# Patient Record
Sex: MALE | Race: WHITE | Employment: OTHER | URBAN - NONMETROPOLITAN AREA
[De-identification: names, ages, dates, MRNs, and addresses within clinical notes are randomized per-mention and may not be internally consistent; named-entity substitution may affect disease eponyms.]

---

## 2017-01-10 DIAGNOSIS — I10 ESSENTIAL HYPERTENSION: Primary | ICD-10-CM

## 2017-01-11 RX ORDER — LISINOPRIL 30 MG/1
TABLET ORAL
Qty: 90 TABLET | Refills: 2 | Status: SHIPPED | OUTPATIENT
Start: 2017-01-11 | End: 2017-10-10

## 2017-01-19 ENCOUNTER — TELEPHONE (OUTPATIENT)
Dept: ONCOLOGY | Facility: OTHER | Age: 67
End: 2017-01-19

## 2017-01-19 ENCOUNTER — ONCOLOGY VISIT (OUTPATIENT)
Dept: ONCOLOGY | Facility: OTHER | Age: 67
End: 2017-01-19
Attending: PSYCHIATRY & NEUROLOGY
Payer: MEDICARE

## 2017-01-19 VITALS
OXYGEN SATURATION: 98 % | HEIGHT: 71 IN | SYSTOLIC BLOOD PRESSURE: 129 MMHG | RESPIRATION RATE: 16 BRPM | BODY MASS INDEX: 23.66 KG/M2 | DIASTOLIC BLOOD PRESSURE: 76 MMHG | HEART RATE: 69 BPM | WEIGHT: 169 LBS | TEMPERATURE: 98 F

## 2017-01-19 DIAGNOSIS — C91.10 CLL (CHRONIC LYMPHOCYTIC LEUKEMIA) (H): Primary | ICD-10-CM

## 2017-01-19 DIAGNOSIS — C91.10 CLL (CHRONIC LYMPHOCYTIC LEUKEMIA) (H): ICD-10-CM

## 2017-01-19 LAB
ALBUMIN SERPL-MCNC: 4.4 G/DL (ref 3.4–5)
ALP SERPL-CCNC: 61 U/L (ref 40–150)
ALT SERPL W P-5'-P-CCNC: 31 U/L (ref 0–70)
ANION GAP SERPL CALCULATED.3IONS-SCNC: 7 MMOL/L (ref 3–14)
AST SERPL W P-5'-P-CCNC: 24 U/L (ref 0–45)
BASOPHILS # BLD AUTO: 0 10E9/L (ref 0–0.2)
BASOPHILS NFR BLD AUTO: 0 %
BILIRUB SERPL-MCNC: 0.9 MG/DL (ref 0.2–1.3)
BUN SERPL-MCNC: 20 MG/DL (ref 7–30)
CALCIUM SERPL-MCNC: 8.5 MG/DL (ref 8.5–10.1)
CHLORIDE SERPL-SCNC: 104 MMOL/L (ref 94–109)
CO2 SERPL-SCNC: 30 MMOL/L (ref 20–32)
CREAT SERPL-MCNC: 1.04 MG/DL (ref 0.66–1.25)
DIFFERENTIAL METHOD BLD: ABNORMAL
EOSINOPHIL # BLD AUTO: 0 10E9/L (ref 0–0.7)
EOSINOPHIL NFR BLD AUTO: 0 %
ERYTHROCYTE [DISTWIDTH] IN BLOOD BY AUTOMATED COUNT: 13.6 % (ref 10–15)
GFR SERPL CREATININE-BSD FRML MDRD: 71 ML/MIN/1.7M2
GLUCOSE SERPL-MCNC: 93 MG/DL (ref 70–99)
HCT VFR BLD AUTO: 41.5 % (ref 40–53)
HGB BLD-MCNC: 14 G/DL (ref 13.3–17.7)
LDH SERPL L TO P-CCNC: 280 U/L (ref 85–227)
LYMPHOCYTES # BLD AUTO: 39.6 10E9/L (ref 0.8–5.3)
LYMPHOCYTES NFR BLD AUTO: 73 %
MCH RBC QN AUTO: 30.7 PG (ref 26.5–33)
MCHC RBC AUTO-ENTMCNC: 33.7 G/DL (ref 31.5–36.5)
MCV RBC AUTO: 91 FL (ref 78–100)
MONOCYTES # BLD AUTO: 1.1 10E9/L (ref 0–1.3)
MONOCYTES NFR BLD AUTO: 2 %
NEUTROPHILS # BLD AUTO: 13.6 10E9/L (ref 1.6–8.3)
NEUTROPHILS NFR BLD AUTO: 25 %
PLATELET # BLD AUTO: 138 10E9/L (ref 150–450)
POTASSIUM SERPL-SCNC: 4.7 MMOL/L (ref 3.4–5.3)
PROT SERPL-MCNC: 7.3 G/DL (ref 6.8–8.8)
RBC # BLD AUTO: 4.56 10E12/L (ref 4.4–5.9)
SMUDGE CELLS BLD QL SMEAR: ABNORMAL
SODIUM SERPL-SCNC: 141 MMOL/L (ref 133–144)
VARIANT LYMPHS BLD QL SMEAR: ABNORMAL
WBC # BLD AUTO: 54.2 10E9/L (ref 4–11)

## 2017-01-19 PROCEDURE — 99213 OFFICE O/P EST LOW 20 MIN: CPT | Performed by: NURSE PRACTITIONER

## 2017-01-19 PROCEDURE — 99212 OFFICE O/P EST SF 10 MIN: CPT

## 2017-01-19 PROCEDURE — 83615 LACTATE (LD) (LDH) ENZYME: CPT | Performed by: NURSE PRACTITIONER

## 2017-01-19 PROCEDURE — 36415 COLL VENOUS BLD VENIPUNCTURE: CPT | Performed by: NURSE PRACTITIONER

## 2017-01-19 PROCEDURE — 85025 COMPLETE CBC W/AUTO DIFF WBC: CPT | Performed by: NURSE PRACTITIONER

## 2017-01-19 PROCEDURE — 80053 COMPREHEN METABOLIC PANEL: CPT | Performed by: NURSE PRACTITIONER

## 2017-01-19 ASSESSMENT — PAIN SCALES - GENERAL: PAINLEVEL: NO PAIN (0)

## 2017-01-19 NOTE — TELEPHONE ENCOUNTER
DATE:  1/19/2017   TIME OF RECEIPT FROM LAB:  8:46  LAB TEST:  White blood count  LAB VALUE:  54.2  RESULTS GIVEN WITH READ-BACK TO (PROVIDER):  greta  TIME LAB VALUE REPORTED TO PROVIDER:   8:49    Pretty Davenport

## 2017-01-19 NOTE — PROGRESS NOTES
Oncology Follow-up Visit:  January 19, 2017    Reason for Visit:  Patient presents with:  RECHECK: Follow up CLL  *_* Living Will *_*: Has paperwork     Nursing Note and documentation reviewed: yes    HPI:  This is a 66-year-old male patient who presents to the oncology clinic today in routine follow-up of CLL.  Extensive workup was completed in August of 2013 with initial consult. He has been followed with surveillance.  He continues to feel well.  He has no problems with fatigue or weight loss; he denies any fevers or night sweats or lumps or bumps noted.    Oncologic History:   Damien was diagnosed with CLL in August 2013 after his routine physical with Dr. Kenney showed an elevation of his white blood cell count on his CBC. CBC at that time revealed WBC of 18.9 with 69.6% lymphocytes, H&H was 14.8 and 42.3, platelet count was 158,000. Peripheral blood smear showed lymphocytosis consistent with chronic lymphocytic leukemia/small lymphocytic lymphoma and mild thrombocytopenia. Peripheral blood flow for cytometry showed an abnormal B cell population consistent with B-cell CLL. The patient was CD5-positive, CD10 negative as well as CD23 positive with 47% B cells expressed at C38 above levels of controls, 80% B cells had levels of cytoplasmic 70 comparable to T lymphocytes and assessment was sent for cytogenetics and FISH. Serum protein electrophoresis was negative. The FISH study was not able to be completed at that time.   He was then referred to Dr. Shantanu Stubbs at the [facility] who saw the patient on 10/16/2013. In review of patient's studies, he felt the patient had stage II CLL as he did have mild splenomegaly on his PET scan. He had concerns about flow cytometry findings as it pointed to a more aggressive disease and the FISH testing was repeated and showed predominance of trisomy 12 which was consistent with intermediate prognosis. It was felt the major determinant of prognosis in CLL would be the  behavior of the disease over the course of the next several months to a year and in subsequent followups patient has been essentially asymptomatic. Dr. Tidwell recommended CBCs every 3 months along with physical exams for nodes and organomegaly and recommended a CT scan yearly.     Current Chemo Regime/TX: n/a  Current Cycle: n/a  # of completed cycles: n/a    Previous treatment: n/a    Past Medical History   Diagnosis Date     Elevated blood pressure reading without diagnosis of hypertension 10/16/2002     Fracture of metatarsal bone(s), closed 10/29/2001     right 5th metatarsal bone     Other orthopedic aftercare(V54.89) 11/08/2001     Routine general medical examination at [facility] 12/01/2000     Special screening for malignant neoplasms, colon 02/04/2004     Hypertension      Chronic lymphocytic leukemia (CLL), T-cell (H)      white counts in the 34,000 to 40,000        Past Surgical History   Procedure Laterality Date     Back surgery  1997     L4-L5 partial laminectomy     Laminectomy cerivcal posterior one level  1997     Hc left heart catheterization  05/10/2005     Normal angiogram     Colonoscopy  02/04/2004     No polyps     Colonoscopy  8/4/2014     Procedure: COLONOSCOPY;  Surgeon: Meliza Morales MD;  Location: [facility] OR       Family History   Problem Relation Age of Onset     HEART DISEASE Mother      stents the 2nd time, CABG     C.A.D. Mother      CEREBROVASCULAR DISEASE Father 58     HEART DISEASE Father      Unknown/Adopted Maternal Grandmother      Unknown/Adopted Maternal Grandfather      Unknown/Adopted Paternal Grandmother      Unknown/Adopted Paternal Grandfather      Hypertension Brother      Hypertension Brother      CANCER No family hx of      skin cancer       Social History     Social History     Marital Status:      Spouse Name: N/A     Number of Children: N/A     Years of Education: N/A     Occupational History     Not on file.     Social History Main Topics     Smoking  "status: Never Smoker      Smokeless tobacco: Never Used     Alcohol Use: Yes      Comment: red wine nightly     Drug Use: No     Sexual Activity:     Partners: Female     Other Topics Concern     Not on file     Social History Narrative       Current Outpatient Prescriptions   Medication     lisinopril (PRINIVIL,ZESTRIL) 30 MG tablet     multivitamin (OCUVITE) TABS     MAGNESIUM OXIDE PO     aspirin 81 MG tablet     MULTIPLE VITAMIN PO     calcium-vitamin D (CALCIUM 600/VITAMIN D) 600-400 MG-UNIT per tablet     fish oil-omega-3 fatty acids (FISH OIL) 1000 MG capsule     Garlic 1000 MG CAPS     Coenzyme Q10 (COQ10) 100 MG CAPS     Glucosamine-Chondroitin (GLUCOSAMINE CHONDR COMPLEX PO)     Ferrous Sulfate (IRON) 325 (65 FE) MG tablet     B Complex-Biotin-FA (MULTI-B COMPLEX PO)     No current facility-administered medications for this visit.        No Known Allergies    Review Of Systems:  Constitutional: denies fever, weight changes, chills, and night sweats.  Eyes: denies blurred or double vision  Ears/Nose/Throat: denies ear pain, nose problems, difficulty swallowing  Respiratory: denies shortness of breath, cough  Skin: denies rash, lesions  Cardiovascular: denies chest pain, palpitations, edema  Gastrointestinal: denies abdominal pain, bloating, nausea, vomiting, early satiety  Genitourinary: denies difficulty with urination, blood in urine  Musculoskeletal:denies new muscle pain, bone pain  Neurologic: denies lightheadedness, headaches, numbness or tingling  Psychiatric: denies anxiety, depression  Hematologic/Lymphatic/Immunologic: denies easy bruising, easy bleeding, lumps or bumps noted  Endocrine: Denies increased thirst    Physical Exam:  /76 mmHg  Pulse 69  Temp(Src) 98  F (36.7  C) (Tympanic)  Resp 16  Ht 1.803 m (5' 11\")  Wt 76.658 kg (169 lb)  BMI 23.58 kg/m2  SpO2 98%    GENERAL APPEARANCE: Healthy, alert and in no acute distress.  HEENT: Normocephalic, Sclerae anicteric. Oropharynx " without ulcers, lesions, or thrush.  NECK: Supple. No asymmetry or masses, no thyromegaly.  LYMPHATICS: No palpable cervical, supraclavicular, axillary, or inguinal nodes   RESP: Lungs clear to auscultation bilaterally, respirations regular and easy  CARDIOVASCULAR: Regular rate and rhythm. Normal S1, S2; no murmur, gallop, or rub.  ABDOMEN: Soft, nontender. Bowel sounds auscultated all 4 quadrants. No palpable organomegaly or masses.  MUSCULOSKELETAL: Extremities without gross deformities noted. No edema of bilateral lower extremities.  SKIN: No suspicious lesions or rashes.  NEURO: Alert and oriented x 3.  Gait steady.  PSYCHIATRIC: Mentation and affect appear normal.  Mood appropriate.    Laboratory:  Results for orders placed or performed in visit on 01/19/17   CBC with platelets differential   Result Value Ref Range    WBC 54.2 (HH) 4.0 - 11.0 10e9/L    RBC Count 4.56 4.4 - 5.9 10e12/L    Hemoglobin 14.0 13.3 - 17.7 g/dL    Hematocrit 41.5 40.0 - 53.0 %    MCV 91 78 - 100 fl    MCH 30.7 26.5 - 33.0 pg    MCHC 33.7 31.5 - 36.5 g/dL    RDW 13.6 10.0 - 15.0 %    Platelet Count 138 (L) 150 - 450 10e9/L    Diff Method Manual Differential     % Neutrophils 25.0 %    % Lymphocytes 73.0 %    % Monocytes 2.0 %    % Eosinophils 0.0 %    % Basophils 0.0 %    Absolute Neutrophil 13.6 (H) 1.6 - 8.3 10e9/L    Absolute Lymphocytes 39.6 (H) 0.8 - 5.3 10e9/L    Absolute Monocytes 1.1 0.0 - 1.3 10e9/L    Absolute Eosinophils 0.0 0.0 - 0.7 10e9/L    Absolute Basophils 0.0 0.0 - 0.2 10e9/L    Reactive Lymphs P     Smudge Cells P    Lactate Dehydrogenase   Result Value Ref Range    Lactate Dehydrogenase 280 (H) 85 - 227 U/L   Comprehensive metabolic panel   Result Value Ref Range    Sodium 141 133 - 144 mmol/L    Potassium 4.7 3.4 - 5.3 mmol/L    Chloride 104 94 - 109 mmol/L    Carbon Dioxide 30 20 - 32 mmol/L    Anion Gap 7 3 - 14 mmol/L    Glucose 93 70 - 99 mg/dL    Urea Nitrogen 20 7 - 30 mg/dL    Creatinine 1.04 0.66 - 1.25  mg/dL    GFR Estimate 71 >60 mL/min/1.7m2    GFR Estimate If Black 86 >60 mL/min/1.7m2    Calcium 8.5 8.5 - 10.1 mg/dL    Bilirubin Total 0.9 0.2 - 1.3 mg/dL    Albumin 4.4 3.4 - 5.0 g/dL    Protein Total 7.3 6.8 - 8.8 g/dL    Alkaline Phosphatase 61 40 - 150 U/L    ALT 31 0 - 70 U/L    AST 24 0 - 45 U/L       Imaging Studies:  None for today      ASSESSMENT/PLAN:    #1 CLL: Stage II chronic lymphocytic leukemia. WBC up a bit compared to 3 months ago with slight decrease in lymphocytes and now neutrophils are up a bit. Patient remains asymptomatic.  Will plan to follow up in 3 months with CBC, CMP and LDH.  Will disucuss also with Dr. Booker and if any changes in plan, he is aware I will notify him.  I encouraged patient to call with any questions or concerns.    Nicole Ga  [facility]

## 2017-01-19 NOTE — NURSING NOTE
"Chief Complaint   Patient presents with     RECHECK     Follow up CLL       Initial /76 mmHg  Pulse 69  Temp(Src) 98  F (36.7  C) (Tympanic)  Resp 16  Ht 1.803 m (5' 11\")  Wt 76.658 kg (169 lb)  BMI 23.58 kg/m2  SpO2 98% Estimated body mass index is 23.58 kg/(m^2) as calculated from the following:    Height as of this encounter: 1.803 m (5' 11\").    Weight as of this encounter: 76.658 kg (169 lb).  BP completed using cuff size: regular  Pretty Davenport      Patient was assessed using the NCCN psychosocial distress thermometer. Patient rated the score as a 0. Patient rated current stressors as n/a. Stressors will be brought to the attention of provider or Oncology RN Care Coordinator for a score of 6 or greater or per nurses discretion.     Pretty Davenport        "

## 2017-01-19 NOTE — MR AVS SNAPSHOT
"              After Visit Summary   1/19/2017    Ulysses Purcell    MRN: [id number]           Patient Information     Date Of Birth          1950        Visit Information        Provider Department      1/19/2017 2:00 PM Nicole Ga NP [facility]        Care Instructions    We will call you with plans for follow up  If you have any questions please call [phone]        Follow-ups after your visit        Who to contact     If you have questions or need follow up information about today's clinic visit or your schedule please contact [facility] directly at [phone].  Normal or non-critical lab and imaging results will be communicated to you by MyChart, letter or phone within 4 business days after the clinic has received the results. If you do not hear from us within 7 days, please contact the clinic through [employer] or phone. If you have a critical or abnormal lab result, we will notify you by phone as soon as possible.  Submit refill requests through [employer] or call your pharmacy and they will forward the refill request to us. Please allow 3 business days for your refill to be completed.          Additional Information About Your Visit        MyChart Information     [facility] gives you secure access to your electronic health record. If you see a primary care provider, you can also send messages to your care team and make appointments. If you have questions, please call your primary care clinic.  If you do not have a primary care provider, please call [phone] and they will assist you.        Care EveryWhere ID     This is your Care EveryWhere ID. This could be used by other organizations to access your [facility] medical records  [id number]        Your Vitals Were     Pulse Temperature Respirations Height BMI (Body Mass Index) Pulse Oximetry    69 98  F (36.7  C) (Tympanic) 16 1.803 m (5' 11\") 23.58 kg/m2 98%       Blood Pressure from Last 3 Encounters: "   01/19/17 129/76   10/19/16 141/89   09/16/16 126/70    Weight from Last 3 Encounters:   01/19/17 76.658 kg (169 lb)   10/19/16 75.297 kg (166 lb)   09/16/16 73.029 kg (161 lb)              Today, you had the following     No orders found for display       Primary Care Provider Office Phone # Fax #    Juan Kenney [phone]       [facility] [place] [address] [facility]  [place] [address]        Thank you!     Thank you for choosing [facility] [place]  for your care. Our goal is always to provide you with excellent care. Hearing back from our patients is one way we can continue to improve our services. Please take a few minutes to complete the written survey that you may receive in the mail after your visit with us. Thank you!             Your Updated Medication List - Protect others around you: Learn how to safely use, store and throw away your medicines at www.disposemymeds.org.          This list is accurate as of: 1/19/17  2:36 PM.  Always use your most recent med list.                   Brand Name Dispense Instructions for use    aspirin 81 MG tablet      Take  by mouth daily.       calcium 600/vitamin D 600-400 MG-UNIT per tablet   Generic drug:  calcium-vitamin D      Take 1 tablet by mouth daily.       CoQ10 100 MG Caps      Take  by mouth daily.       fish oil-omega-3 fatty acids 1000 MG capsule      Take 1 capsule by mouth daily.       Garlic 1000 MG Caps      Take  by mouth daily.       GLUCOSAMINE CHONDR COMPLEX PO      Take  by mouth daily.       iron 325 (65 FE) MG tablet      Take 1 tablet by mouth daily.       lisinopril 30 MG tablet    PRINIVIL,ZESTRIL    90 tablet    TAKE 1 TABLET DAILY BY MOUT H       MAGNESIUM OXIDE PO      Take 400 mg by mouth       MULTI-B COMPLEX PO      Take  by mouth daily.       MULTIPLE VITAMIN PO      Take  by mouth daily.       multivitamin Tabs tablet      Take 1 tablet by mouth daily

## 2017-01-27 ENCOUNTER — TELEPHONE (OUTPATIENT)
Dept: ONCOLOGY | Facility: OTHER | Age: 67
End: 2017-01-27

## 2017-01-27 NOTE — TELEPHONE ENCOUNTER
Spoke with patient and let him know we will follow up in 3 months as planned.  I let him know that Louisa Ga spoke with Dr. Booker and that he is not alarmed with the increased WBC as his lymphocytes are down and he is feeling good

## 2017-03-20 ENCOUNTER — OFFICE VISIT (OUTPATIENT)
Dept: FAMILY MEDICINE | Facility: OTHER | Age: 67
End: 2017-03-20
Attending: NURSE PRACTITIONER
Payer: COMMERCIAL

## 2017-03-20 VITALS
HEIGHT: 71 IN | SYSTOLIC BLOOD PRESSURE: 148 MMHG | BODY MASS INDEX: 22.96 KG/M2 | RESPIRATION RATE: 18 BRPM | OXYGEN SATURATION: 98 % | TEMPERATURE: 99.4 F | HEART RATE: 69 BPM | WEIGHT: 164 LBS | DIASTOLIC BLOOD PRESSURE: 76 MMHG

## 2017-03-20 DIAGNOSIS — J01.90 ACUTE SINUSITIS WITH SYMPTOMS GREATER THAN 10 DAYS: Primary | ICD-10-CM

## 2017-03-20 PROCEDURE — 99212 OFFICE O/P EST SF 10 MIN: CPT

## 2017-03-20 PROCEDURE — 99213 OFFICE O/P EST LOW 20 MIN: CPT | Performed by: NURSE PRACTITIONER

## 2017-03-20 RX ORDER — PREDNISONE 20 MG/1
40 TABLET ORAL DAILY
Qty: 10 TABLET | Refills: 0 | Status: SHIPPED | OUTPATIENT
Start: 2017-03-20 | End: 2017-03-25

## 2017-03-20 ASSESSMENT — PAIN SCALES - GENERAL: PAINLEVEL: MODERATE PAIN (5)

## 2017-03-20 NOTE — NURSING NOTE
"Chief Complaint   Patient presents with     Ear Problem       Initial /76 (BP Location: Left arm, Patient Position: Chair, Cuff Size: Adult Regular)  Pulse 69  Temp 99.4  F (37.4  C) (Tympanic)  Resp 18  Ht 5' 11\" (1.803 m)  Wt 164 lb (74.4 kg)  SpO2 98%  BMI 22.87 kg/m2 Estimated body mass index is 22.87 kg/(m^2) as calculated from the following:    Height as of this encounter: 5' 11\" (1.803 m).    Weight as of this encounter: 164 lb (74.4 kg).  Medication Reconciliation: complete   Maura Rowley LPN      "

## 2017-03-20 NOTE — PATIENT INSTRUCTIONS
Sinusitis (Antibiotic Treatment)    The sinuses are air-filled spaces within the bones of the face. They connect to the inside of the nose. Sinusitis is an inflammation of the tissue lining the sinus cavity. Sinus inflammation can occur during a cold. It can also be due to allergies to pollens and other particles in the air. Sinusitis can cause symptoms of sinus congestion and fullness. A sinus infection causes fever, headache and facial pain. There is often green or yellow drainage from the nose or into the back of the throat (post-nasal drip). You have been given antibiotics to treat this condition.  Home care:    Take the full course of antibiotics as instructed. Do not stop taking them, even if you feel better.    Drink plenty of water, hot tea, and other liquids. This may help thin mucus. It also may promote sinus drainage.    Heat may help soothe painful areas of the face. Use a towel soaked in hot water. Or,  the shower and direct the hot spray onto your face. Using a vaporizer along with a menthol rub at night may also help.     An expectorant containing guaifenesin may help thin the mucus and promote drainage from the sinuses.    Over-the-counter decongestants may be used unless a similar medicine was prescribed. Nasal sprays work the fastest. Use one that contains phenylephrine or oxymetazoline. First blow the nose gently. Then use the spray. Do not use these medicines more often than directed on the label or symptoms may get worse. You may also use tablets containing pseudoephedrine. Avoid products that combine ingredients, because side effects may be increased. Read labels. You can also ask the pharmacist for help. (NOTE: Persons with high blood pressure should not use decongestants. They can raise blood pressure.)    Over-the-counter antihistamines may help if allergies contributed to your sinusitis.      Do not use nasal rinses or irrigation during an acute sinus infection, unless told to by  your health care provider. Rinsing may spread the infection to other sinuses.    Use acetaminophen or ibuprofen to control pain, unless another pain medicine was prescribed. (If you have chronic liver or kidney disease or ever had a stomach ulcer, talk with your doctor before using these medicines. Aspirin should never be used in anyone under 18 years of age who is ill with a fever. It may cause severe liver damage.)    Don't smoke. This can worsen symptoms.  Follow-up care  Follow up with your healthcare provider or our staff if you are not improving within the next week.  When to seek medical advice  Call your healthcare provider if any of these occur:    Facial pain or headache becoming more severe    Stiff neck    Unusual drowsiness or confusion    Swelling of the forehead or eyelids    Vision problems, including blurred or double vision    Fever of 100.4 F (38 C) or higher, or as directed by your healthcare provider    Seizure    Breathing problems    Symptoms not resolving within 10 days    [id number] [email or web address] [address] [place] [address], [place] [address]. All rights reserved. This information is not intended as a substitute for professional medical care. Always follow your healthcare professional's instructions.

## 2017-03-20 NOTE — MR AVS SNAPSHOT
After Visit Summary   3/20/2017    Ulysses Purcell    MRN: [id number]           Patient Information     Date Of Birth          1950        Visit Information        Provider Department      3/20/2017 11:40 AM Shelly Sanford APRN [facility]        Today's Diagnoses     Acute sinusitis with symptoms greater than 10 days    -  1      Care Instructions      Sinusitis (Antibiotic Treatment)    The sinuses are air-filled spaces within the bones of the face. They connect to the inside of the nose. Sinusitis is an inflammation of the tissue lining the sinus cavity. Sinus inflammation can occur during a cold. It can also be due to allergies to pollens and other particles in the air. Sinusitis can cause symptoms of sinus congestion and fullness. A sinus infection causes fever, headache and facial pain. There is often green or yellow drainage from the nose or into the back of the throat (post-nasal drip). You have been given antibiotics to treat this condition.  Home care:    Take the full course of antibiotics as instructed. Do not stop taking them, even if you feel better.    Drink plenty of water, hot tea, and other liquids. This may help thin mucus. It also may promote sinus drainage.    Heat may help soothe painful areas of the face. Use a towel soaked in hot water. Or,  the shower and direct the hot spray onto your face. Using a vaporizer along with a menthol rub at night may also help.     An expectorant containing guaifenesin may help thin the mucus and promote drainage from the sinuses.    Over-the-counter decongestants may be used unless a similar medicine was prescribed. Nasal sprays work the fastest. Use one that contains phenylephrine or oxymetazoline. First blow the nose gently. Then use the spray. Do not use these medicines more often than directed on the label or symptoms may get worse. You may also use tablets containing pseudoephedrine. Avoid products  that combine ingredients, because side effects may be increased. Read labels. You can also ask the pharmacist for help. (NOTE: Persons with high blood pressure should not use decongestants. They can raise blood pressure.)    Over-the-counter antihistamines may help if allergies contributed to your sinusitis.      Do not use nasal rinses or irrigation during an acute sinus infection, unless told to by your health care provider. Rinsing may spread the infection to other sinuses.    Use acetaminophen or ibuprofen to control pain, unless another pain medicine was prescribed. (If you have chronic liver or kidney disease or ever had a stomach ulcer, talk with your doctor before using these medicines. Aspirin should never be used in anyone under 18 years of age who is ill with a fever. It may cause severe liver damage.)    Don't smoke. This can worsen symptoms.  Follow-up care  Follow up with your healthcare provider or our staff if you are not improving within the next week.  When to seek medical advice  Call your healthcare provider if any of these occur:    Facial pain or headache becoming more severe    Stiff neck    Unusual drowsiness or confusion    Swelling of the forehead or eyelids    Vision problems, including blurred or double vision    Fever of 100.4 F (38 C) or higher, or as directed by your healthcare provider    Seizure    Breathing problems    Symptoms not resolving within 10 days    [id number] [employer]. [address] [place] [address]. All rights reserved. This information is not intended as a substitute for professional medical care. Always follow your healthcare professional's instructions.              Follow-ups after your visit        Follow-up notes from your care team     Return if symptoms worsen or fail to improve.      Your next 10 appointments already scheduled     Apr 27, 2017 12:30 PM CDT   LAB with  LAB   [facility]    [address] [address]   [phone]            Apr 27, 2017  1:00 PM CDT   Return Visit with Nicole Ga NP   [facility] [place] ([facility])    [address] [place] [address]   [phone]              Who to contact     If you have questions or need follow up information about today's clinic visit or your schedule please contact [facility] directly at [phone].  Normal or non-critical lab and imaging results will be communicated to you by [employer], letter or phone within 4 business days after the clinic has received the results. If you do not hear from us within 7 days, please contact the clinic through [employer] or phone. If you have a critical or abnormal lab result, we will notify you by phone as soon as possible.  Submit refill requests through [employer] or call your pharmacy and they will forward the refill request to us. Please allow 3 business days for your refill to be completed.          Additional Information About Your Visit        [employer] Information     [employer] gives you secure access to your electronic health record. If you see a primary care provider, you can also send messages to your care team and make appointments. If you have questions, please call your primary care clinic.  If you do not have a primary care provider, please call [phone] and they will assist you.        Care EveryWhere ID     This is your Care EveryWhere ID. This could be used by other organizations to access your [facility] medical records  [id number]        Your Vitals Were     Pulse Temperature Respirations Height Pulse Oximetry BMI (Body Mass Index)    69 99.4  F (37.4  C) (Tympanic) 18 5' 11\" (1.803 m) 98% 22.87 kg/m2       Blood Pressure from Last 3 Encounters:   03/20/17 148/76   01/19/17 129/76   10/19/16 141/89    Weight from Last 3 Encounters:   03/20/17 164 lb (74.4 kg)   01/19/17 169 lb (76.7 kg)   10/19/16 166 lb (75.3 kg)              Today, you had the " following     No orders found for display         Today's Medication Changes          These changes are accurate as of: 3/20/17 11:44 AM.  If you have any questions, ask your nurse or doctor.               Start taking these medicines.        Dose/Directions    amoxicillin-clavulanate 875-125 MG per tablet   Commonly known as:  AUGMENTIN   Used for:  Acute sinusitis with symptoms greater than 10 days   Started by:  Shelly Sanford APRN CNP        Dose:  1 tablet   Take 1 tablet by mouth 2 times daily   Quantity:  20 tablet   Refills:  0       predniSONE 20 MG tablet   Commonly known as:  DELTASONE   Used for:  Acute sinusitis with symptoms greater than 10 days   Started by:  Shelly Sanford APRN CNP        Dose:  40 mg   Take 2 tablets (40 mg) by mouth daily for 5 days   Quantity:  10 tablet   Refills:  0            Where to get your medicines      These medications were sent to [id number] - [place], [place] - [address]  [address] [place] [address] [place] [address]     Phone:  [phone]     amoxicillin-clavulanate 875-125 MG per tablet    predniSONE 20 MG tablet                Primary Care Provider Office Phone # Fax #    Juan Keith Kenney [phone]       [facility] [place] [address]  [place] [address]        Thank you!     Thank you for choosing [facility]  for your care. Our goal is always to provide you with excellent care. Hearing back from our patients is one way we can continue to improve our services. Please take a few minutes to complete the written survey that you may receive in the mail after your visit with us. Thank you!             Your Updated Medication List - Protect others around you: Learn how to safely use, store and throw away your medicines at www.disposemymeds.org.          This list is accurate as of: 3/20/17 11:44 AM.  Always use your most recent med list.                   Brand Name Dispense Instructions for  use    amoxicillin-clavulanate 875-125 MG per tablet    AUGMENTIN    20 tablet    Take 1 tablet by mouth 2 times daily       aspirin 81 MG tablet      Take  by mouth daily.       calcium 600/vitamin D 600-400 MG-UNIT per tablet   Generic drug:  calcium-vitamin D      Take 1 tablet by mouth daily.       CoQ10 100 MG Caps      Take  by mouth daily.       fish oil-omega-3 fatty acids 1000 MG capsule      Take 1 capsule by mouth daily.       Garlic 1000 MG Caps      Take  by mouth daily.       GLUCOSAMINE CHONDR COMPLEX PO      Take  by mouth daily.       iron 325 (65 FE) MG tablet      Take 1 tablet by mouth daily.       lisinopril 30 MG tablet    PRINIVIL,ZESTRIL    90 tablet    TAKE 1 TABLET DAILY BY MOUT H       MAGNESIUM OXIDE PO      Take 400 mg by mouth       MULTI-B COMPLEX PO      Take  by mouth daily.       MULTIPLE VITAMIN PO      Take  by mouth daily.       multivitamin Tabs tablet      Take 1 tablet by mouth daily       predniSONE 20 MG tablet    DELTASONE    10 tablet    Take 2 tablets (40 mg) by mouth daily for 5 days

## 2017-03-20 NOTE — PROGRESS NOTES
SUBJECTIVE:                                                    Ulysses Purcell is a 66 year old male who presents to clinic today for the following health issues:    Ulysses states he has recently flown and had difficulty with his ears regulating after flying. Over the weekend he noted increase pain into the left ear and down into the neck.    Ears plugged and now having neck pain      Duration: 2 weeks    Description (location/character/radiation): both ears have felt plugged since flying 2 weeks ago and now there is pain going down into his neck on the left side    Intensity:  5/10    Accompanying signs and symptoms: headaches at times    History (similar episodes/previous evaluation): None    Precipitating or alleviating factors: None    Therapies tried and outcome: Antihistamines - no benefit           Problem list and histories reviewed & adjusted, as indicated.  Additional history: as documented    Patient Active Problem List   Diagnosis     Hypertension     Abdominal bruit     CLL (chronic lymphocytic leukemia) (H)     Routine general medical examination at a health care facility     ACP (advance care planning)     Diarrhea     Viral gastroenteritis     Past Surgical History   Procedure Laterality Date     Back surgery  1997     L4-L5 partial laminectomy     Laminectomy cerivcal posterior one level  1997     Hc left heart catheterization  05/10/2005     Normal angiogram     Colonoscopy  02/04/2004     No polyps     Colonoscopy  8/4/2014     Procedure: COLONOSCOPY;  Surgeon: Meliza Morales MD;  Location: [facility] OR       Social History   Substance Use Topics     Smoking status: Never Smoker     Smokeless tobacco: Never Used     Alcohol use Yes      Comment: red wine nightly     Family History   Problem Relation Age of Onset     HEART DISEASE Mother      stents the 2nd time, CABG     C.A.D. Mother      CEREBROVASCULAR DISEASE Father 58     HEART DISEASE Father      Unknown/Adopted Maternal Grandmother       "Unknown/Adopted Maternal Grandfather      Unknown/Adopted Paternal Grandmother      Unknown/Adopted Paternal Grandfather      Hypertension Brother      Hypertension Brother      CANCER No family hx of      skin cancer         Current Outpatient Prescriptions   Medication Sig Dispense Refill     lisinopril (PRINIVIL,ZESTRIL) 30 MG tablet TAKE 1 TABLET DAILY BY MOUT H 90 tablet 2     multivitamin (OCUVITE) TABS Take 1 tablet by mouth daily       MAGNESIUM OXIDE PO Take 400 mg by mouth       aspirin 81 MG tablet Take  by mouth daily.       MULTIPLE VITAMIN PO Take  by mouth daily.       calcium-vitamin D (CALCIUM 600/VITAMIN D) 600-400 MG-UNIT per tablet Take 1 tablet by mouth daily.       fish oil-omega-3 fatty acids (FISH OIL) 1000 MG capsule Take 1 capsule by mouth daily.        Garlic 1000 MG CAPS Take  by mouth daily.       Coenzyme Q10 (COQ10) 100 MG CAPS Take  by mouth daily.       Glucosamine-Chondroitin (GLUCOSAMINE CHONDR COMPLEX PO) Take  by mouth daily.       Ferrous Sulfate (IRON) 325 (65 FE) MG tablet Take 1 tablet by mouth daily.       B Complex-Biotin-FA (MULTI-B COMPLEX PO) Take  by mouth daily.       No Known Allergies    Reviewed and updated as needed this visit by clinical staff  Tobacco  Allergies  Meds  Med Hx  Surg Hx  Fam Hx  Soc Hx      Reviewed and updated as needed this visit by Provider         ROS:  C: NEGATIVE for fever, chills, change in weight  ENT/MOUTH: ear pain lt>rt both feel plugged  R: NEGATIVE for significant cough or SOB  CV: NEGATIVE for chest pain, palpitations or peripheral edema    OBJECTIVE:                                                    /76 (BP Location: Left arm, Patient Position: Chair, Cuff Size: Adult Regular)  Pulse 69  Temp 99.4  F (37.4  C) (Tympanic)  Resp 18  Ht 5' 11\" (1.803 m)  Wt 164 lb (74.4 kg)  SpO2 98%  BMI 22.87 kg/m2  Body mass index is 22.87 kg/(m^2).   HENT: normal cephalic/atraumatic, ear canals and TM's normal, nose and mouth without " ulcers or lesions, oropharynx clear, oral mucous membranes moist and sinuses: frontal tenderness on both sides  RESP: lungs clear to auscultation - no rales, rhonchi or wheezes  CV: regular rate and rhythm, normal S1 S2, no S3 or S4, no murmur, click or rub, no peripheral edema and peripheral pulses strong  ABDOMEN: soft, nontender, no hepatosplenomegaly, no masses and bowel sounds normal  PSYCH: mentation appears normal, affect normal/bright  LYMPH: anterior cervical: enlarged lymph nodes in the anterior cervical and submandibular area.    Diagnostic Test Results:  none      ASSESSMENT:                                                        PLAN:                                                    ASSESSMENT / PLAN:  (J01.90) Acute sinusitis with symptoms greater than 10 days  (primary encounter diagnosis)  Comment:   Plan:  amoxicillin-clavulanate (AUGMENTIN) 875-125 MG per tablet,    predniSONE (DELTASONE) 20 MG tablet            Follow up if no improvement        [facility]

## 2017-04-27 ENCOUNTER — TELEPHONE (OUTPATIENT)
Dept: ONCOLOGY | Facility: OTHER | Age: 67
End: 2017-04-27

## 2017-04-27 ENCOUNTER — ONCOLOGY VISIT (OUTPATIENT)
Dept: ONCOLOGY | Facility: OTHER | Age: 67
End: 2017-04-27
Attending: INTERNAL MEDICINE
Payer: MEDICARE

## 2017-04-27 VITALS
DIASTOLIC BLOOD PRESSURE: 85 MMHG | RESPIRATION RATE: 18 BRPM | HEIGHT: 71 IN | WEIGHT: 168 LBS | BODY MASS INDEX: 23.52 KG/M2 | TEMPERATURE: 97.5 F | HEART RATE: 70 BPM | SYSTOLIC BLOOD PRESSURE: 133 MMHG

## 2017-04-27 DIAGNOSIS — C91.10 CLL (CHRONIC LYMPHOCYTIC LEUKEMIA) (H): ICD-10-CM

## 2017-04-27 DIAGNOSIS — C91.10 CLL (CHRONIC LYMPHOCYTIC LEUKEMIA) (H): Primary | ICD-10-CM

## 2017-04-27 LAB
ALBUMIN SERPL-MCNC: 4.2 G/DL (ref 3.4–5)
ALP SERPL-CCNC: 65 U/L (ref 40–150)
ALT SERPL W P-5'-P-CCNC: 30 U/L (ref 0–70)
ANION GAP SERPL CALCULATED.3IONS-SCNC: 4 MMOL/L (ref 3–14)
AST SERPL W P-5'-P-CCNC: 27 U/L (ref 0–45)
BASOPHILS # BLD AUTO: 0 10E9/L (ref 0–0.2)
BASOPHILS NFR BLD AUTO: 0 %
BILIRUB SERPL-MCNC: 0.6 MG/DL (ref 0.2–1.3)
BUN SERPL-MCNC: 12 MG/DL (ref 7–30)
CALCIUM SERPL-MCNC: 8.4 MG/DL (ref 8.5–10.1)
CHLORIDE SERPL-SCNC: 104 MMOL/L (ref 94–109)
CO2 SERPL-SCNC: 31 MMOL/L (ref 20–32)
CREAT SERPL-MCNC: 0.96 MG/DL (ref 0.66–1.25)
DIFFERENTIAL METHOD BLD: ABNORMAL
EOSINOPHIL # BLD AUTO: 0.7 10E9/L (ref 0–0.7)
EOSINOPHIL NFR BLD AUTO: 1 %
ERYTHROCYTE [DISTWIDTH] IN BLOOD BY AUTOMATED COUNT: 13.6 % (ref 10–15)
GFR SERPL CREATININE-BSD FRML MDRD: 78 ML/MIN/1.7M2
GLUCOSE SERPL-MCNC: 95 MG/DL (ref 70–99)
HCT VFR BLD AUTO: 40.2 % (ref 40–53)
HGB BLD-MCNC: 13.5 G/DL (ref 13.3–17.7)
LDH SERPL L TO P-CCNC: 334 U/L (ref 85–227)
LYMPHOCYTES # BLD AUTO: 63 10E9/L (ref 0.8–5.3)
LYMPHOCYTES NFR BLD AUTO: 87 %
MCH RBC QN AUTO: 30.1 PG (ref 26.5–33)
MCHC RBC AUTO-ENTMCNC: 33.6 G/DL (ref 31.5–36.5)
MCV RBC AUTO: 90 FL (ref 78–100)
MONOCYTES # BLD AUTO: 1.4 10E9/L (ref 0–1.3)
MONOCYTES NFR BLD AUTO: 2 %
NEUTROPHILS # BLD AUTO: 7.2 10E9/L (ref 1.6–8.3)
NEUTROPHILS NFR BLD AUTO: 10 %
PLATELET # BLD AUTO: 152 10E9/L (ref 150–450)
POTASSIUM SERPL-SCNC: 5.2 MMOL/L (ref 3.4–5.3)
PROT SERPL-MCNC: 7.2 G/DL (ref 6.8–8.8)
RBC # BLD AUTO: 4.48 10E12/L (ref 4.4–5.9)
SODIUM SERPL-SCNC: 139 MMOL/L (ref 133–144)
WBC # BLD AUTO: 72.4 10E9/L (ref 4–11)

## 2017-04-27 PROCEDURE — 83615 LACTATE (LD) (LDH) ENZYME: CPT | Performed by: NURSE PRACTITIONER

## 2017-04-27 PROCEDURE — 36415 COLL VENOUS BLD VENIPUNCTURE: CPT | Performed by: NURSE PRACTITIONER

## 2017-04-27 PROCEDURE — 80053 COMPREHEN METABOLIC PANEL: CPT | Performed by: NURSE PRACTITIONER

## 2017-04-27 PROCEDURE — 99213 OFFICE O/P EST LOW 20 MIN: CPT | Performed by: NURSE PRACTITIONER

## 2017-04-27 PROCEDURE — 85025 COMPLETE CBC W/AUTO DIFF WBC: CPT | Performed by: NURSE PRACTITIONER

## 2017-04-27 PROCEDURE — 99212 OFFICE O/P EST SF 10 MIN: CPT

## 2017-04-27 ASSESSMENT — PAIN SCALES - GENERAL: PAINLEVEL: NO PAIN (0)

## 2017-04-27 NOTE — PROGRESS NOTES
Oncology Follow-up Visit:  April 27, 2017    Reason for Visit:  Patient presents with:  RECHECK: Follow up CLL  *_* Health Care Directive *_*: Brought in paperwork today     Nursing Note and documentation reviewed: yes    HPI:  This is a 66-year-old male patient who presents to the oncology clinic today in routine follow-up of CLL.  Extensive workup was completed in August of 2013 with initial consult. He has been followed with surveillance.    He is feeling good and offers no complaints.  He was on a trip in March and had trouble with his ears on his trip in that they would not unplug.  He did have some sinus issues also and had swelling to the left side of his neck.  He was placed on antibiotics with resolution.  He denies and fevers, night sweats, chills or weight changes.  He denies any issues with fatigue and has no new lumps or bumps.  He does have a small node to the lower part of his left neck that he states has not changed.  This did seem to get larger when he was sick.    Oncologic History: Damien was diagnosed with CLL in August 2013 after his routine physical with Dr. Kenney showed an elevation of his white blood cell count on his CBC. CBC at that time revealed WBC of 18.9 with 69.6% lymphocytes, H&H was 14.8 and 42.3, platelet count was 158,000. Peripheral blood smear showed lymphocytosis consistent with chronic lymphocytic leukemia/small lymphocytic lymphoma and mild thrombocytopenia. Peripheral blood flow for cytometry showed an abnormal B cell population consistent with B-cell CLL. The patient was CD5-positive, CD10 negative as well as CD23 positive with 47% B cells expressed at C38 above levels of controls, 80% B cells had levels of cytoplasmic 70 comparable to T lymphocytes and assessment was sent for cytogenetics and FISH. Serum protein electrophoresis was negative. The FISH study was not able to be completed at that time.   He was then referred to Dr. Shantanu Stubbs at the [facility] who  saw the patient on 10/16/2013. In review of patient's studies, he felt the patient had stage II CLL as he did have mild splenomegaly on his PET scan. He had concerns about flow cytometry findings as it pointed to a more aggressive disease and the FISH testing was repeated and showed predominance of trisomy 12 which was consistent with intermediate prognosis. It was felt the major determinant of prognosis in CLL would be the behavior of the disease over the course of the next several months to a year and in subsequent followups patient has been essentially asymptomatic. Dr. Tidwell recommended CBCs every 3 months along with physical exams for nodes and organomegaly and recommended a CT scan yearly.      Current Chemo Regime/TX: n/a  Current Cycle: n/a  # of completed cycles: n/a     Previous treatment: n/a    Past Medical History:   Diagnosis Date     Chronic lymphocytic leukemia (CLL), T-cell (H)     white counts in the 34,000 to 40,000      Elevated blood pressure reading without diagnosis of hypertension 10/16/2002     Fracture of metatarsal bone(s), closed 10/29/2001    right 5th metatarsal bone     Hypertension      Other orthopedic aftercare(V54.89) 11/08/2001     Routine general medical examination at [facility] 12/01/2000     Special screening for malignant neoplasms, colon 02/04/2004       Past Surgical History:   Procedure Laterality Date     BACK SURGERY  1997    L4-L5 partial laminectomy     COLONOSCOPY  02/04/2004    No polyps     COLONOSCOPY  8/4/2014    Procedure: COLONOSCOPY;  Surgeon: Meliza Morales MD;  Location: [facility] OR      LEFT HEART CATHETERIZATION  05/10/2005    Normal angiogram     LAMINECTOMY CERIVCAL POSTERIOR ONE LEVEL  1997       Family History   Problem Relation Age of Onset     HEART DISEASE Mother      stents the 2nd time, CABG     C.A.D. Mother      CEREBROVASCULAR DISEASE Father 58     HEART DISEASE Father      Unknown/Adopted Maternal Grandmother      Unknown/Adopted Maternal  Grandfather      Unknown/Adopted Paternal Grandmother      Unknown/Adopted Paternal Grandfather      Hypertension Brother      Hypertension Brother      CANCER No family hx of      skin cancer       Social History     Social History     Marital status:      Spouse name: N/A     Number of children: N/A     Years of education: N/A     Occupational History     Not on file.     Social History Main Topics     Smoking status: Never Smoker     Smokeless tobacco: Never Used     Alcohol use Yes      Comment: red wine nightly     Drug use: No     Sexual activity: Yes     Partners: Female     Other Topics Concern     Not on file     Social History Narrative       Current Outpatient Prescriptions   Medication     lisinopril (PRINIVIL,ZESTRIL) 30 MG tablet     multivitamin (OCUVITE) TABS     MAGNESIUM OXIDE PO     aspirin 81 MG tablet     MULTIPLE VITAMIN PO     calcium-vitamin D (CALCIUM 600/VITAMIN D) 600-400 MG-UNIT per tablet     fish oil-omega-3 fatty acids (FISH OIL) 1000 MG capsule     Garlic 1000 MG CAPS     Coenzyme Q10 (COQ10) 100 MG CAPS     Glucosamine-Chondroitin (GLUCOSAMINE CHONDR COMPLEX PO)     Ferrous Sulfate (IRON) 325 (65 FE) MG tablet     B Complex-Biotin-FA (MULTI-B COMPLEX PO)     No current facility-administered medications for this visit.         No Known Allergies    Review Of Systems:  Constitutional: denies fever, weight changes, chills, and night sweats.  Eyes: denies blurred or double vision  Ears/Nose/Throat: denies ear pain, nose problems, difficulty swallowing  Respiratory: denies shortness of breath, cough   Skin: denies rash, lesions  Cardiovascular: denies chest pain, palpitations, edema  Gastrointestinal: denies abdominal pain, bloating, nausea, vomiting, early satiety  Genitourinary: denies difficulty with urination, blood in urine  Musculoskeletal:denies new muscle pain, bone pain  Neurologic: denies lightheadedness, headaches, numbness or tingling  Psychiatric: denies anxiety,  "depression  Hematologic/Lymphatic/Immunologic: denies easy bruising, easy bleeding, new lumps or bumps noted  Endocrine: Denies increased thirst    Physical Exam:  /85 (BP Location: Left arm, Patient Position: Chair, Cuff Size: Adult Regular)  Pulse 70  Temp 97.5  F (36.4  C) (Tympanic)  Resp 18  Ht 1.803 m (5' 11\")  Wt 76.2 kg (168 lb)  BMI 23.43 kg/m2    GENERAL APPEARANCE: Healthy, alert and in no acute distress.  HEENT: Normocephalic, Sclerae anicteric. Oropharynx without ulcers, lesions, or thrush.  NECK: Supple. No asymmetry or masses, no thyromegaly.  LYMPHATICS: small less than 1cm moveable node to the posterior left cervical chain, palpable cervical,no supraclavicular, axillary, or inguinal nodes   RESP: Lungs clear to auscultation bilaterally, respirations regular and easy  CARDIOVASCULAR: Regular rate and rhythm. Normal S1, S2; no murmur, gallop, or rub.  ABDOMEN: Soft, nontender. Bowel sounds auscultated all 4 quadrants. No palpable organomegaly or masses.  MUSCULOSKELETAL: Extremities without gross deformities noted. No edema of bilateral lower extremities.  SKIN: No suspicious lesions or rashes.  NEURO: Alert and oriented x 3.  Gait steady.  PSYCHIATRIC: Mentation and affect appear normal.  Mood appropriate.    Laboratory:  Results for orders placed or performed in visit on 04/27/17   CBC with platelets differential   Result Value Ref Range    WBC 72.4 (HH) 4.0 - 11.0 10e9/L    RBC Count 4.48 4.4 - 5.9 10e12/L    Hemoglobin 13.5 13.3 - 17.7 g/dL    Hematocrit 40.2 40.0 - 53.0 %    MCV 90 78 - 100 fl    MCH 30.1 26.5 - 33.0 pg    MCHC 33.6 31.5 - 36.5 g/dL    RDW 13.6 10.0 - 15.0 %    Platelet Count 152 150 - 450 10e9/L    Diff Method Manual Differential     % Neutrophils 10.0 %    % Lymphocytes 87.0 %    % Monocytes 2.0 %    % Eosinophils 1.0 %    % Basophils 0.0 %    Absolute Neutrophil 7.2 1.6 - 8.3 10e9/L    Absolute Lymphocytes 63.0 (H) 0.8 - 5.3 10e9/L    Absolute Monocytes 1.4 (H) 0.0 " - 1.3 10e9/L    Absolute Eosinophils 0.7 0.0 - 0.7 10e9/L    Absolute Basophils 0.0 0.0 - 0.2 10e9/L   Comprehensive metabolic panel   Result Value Ref Range    Sodium 139 133 - 144 mmol/L    Potassium 5.2 3.4 - 5.3 mmol/L    Chloride 104 94 - 109 mmol/L    Carbon Dioxide 31 20 - 32 mmol/L    Anion Gap 4 3 - 14 mmol/L    Glucose 95 70 - 99 mg/dL    Urea Nitrogen 12 7 - 30 mg/dL    Creatinine 0.96 0.66 - 1.25 mg/dL    GFR Estimate 78 >60 mL/min/1.7m2    GFR Estimate If Black >90   GFR Calc   >60 mL/min/1.7m2    Calcium 8.4 (L) 8.5 - 10.1 mg/dL    Bilirubin Total 0.6 0.2 - 1.3 mg/dL    Albumin 4.2 3.4 - 5.0 g/dL    Protein Total 7.2 6.8 - 8.8 g/dL    Alkaline Phosphatase 65 40 - 150 U/L    ALT 30 0 - 70 U/L    AST 27 0 - 45 U/L   Lactate Dehydrogenase   Result Value Ref Range    Lactate Dehydrogenase 334 (H) 85 - 227 U/L       Imaging Studies:  None for today      ASSESSMENT/PLAN:    #1 CLL: Stage II chronic lymphocytic leukemia.  WBC and lymphocytes are up along with increase in LDH.  Patient is asymptomatic.  We will obtain a CT neck, chest, abdomen and pelvis and he will follow up with Dr. Booker for these results.    I encouraged patient to call with any questions or concerns.       Nicole Ga  [facility]

## 2017-04-27 NOTE — TELEPHONE ENCOUNTER
DATE:  4/27/2017   TIME OF RECEIPT FROM LAB:  8:59 am  LAB TEST:  WBC  LAB VALUE:  72.4  RESULTS GIVEN WITH READ-BACK TO (PROVIDER):  Harmeet  TIME LAB VALUE REPORTED TO PROVIDER:   9:01 am    Pretty Davenport

## 2017-04-27 NOTE — PATIENT INSTRUCTIONS
We would like to see you back after scans for an appointment with Dr. Booker. When you are in need of a refill, please call your pharmacy and they will send us a request. If you have any questions please call [phone]

## 2017-04-27 NOTE — NURSING NOTE
"Chief Complaint   Patient presents with     RECHECK     Follow up CLL     *_* Health Care Directive *_*     Brought in paperwork today       Initial /85 (BP Location: Left arm, Patient Position: Chair, Cuff Size: Adult Regular)  Pulse 70  Temp 97.5  F (36.4  C) (Tympanic)  Resp 18  Ht 1.803 m (5' 11\")  Wt 76.2 kg (168 lb)  BMI 23.43 kg/m2 Estimated body mass index is 23.43 kg/(m^2) as calculated from the following:    Height as of this encounter: 1.803 m (5' 11\").    Weight as of this encounter: 76.2 kg (168 lb).  Medication Reconciliation: complete     Pretty Davenport    Patient was assessed using the NCCN psychosocial distress thermometer. Patient rated the score as a 0. Patient rated current stressors as n/a. Stressors will be brought to the attention of provider or Oncology RN Care Coordinator for a score of 6 or greater or per nurses discretion.     Pretty Davenport      "

## 2017-04-27 NOTE — MR AVS SNAPSHOT
After Visit Summary   4/27/2017    Ulysses Purcell    MRN: [id number]           Patient Information     Date Of Birth          1950        Visit Information        Provider Department      4/27/2017 1:00 PM Nicole Ga NP [facility]        Today's Diagnoses     CLL (chronic lymphocytic leukemia) (H)    -  1      Care Instructions    We would like to see you back after scans for an appointment with Dr. Booker. When you are in need of a refill, please call your pharmacy and they will send us a request. If you have any questions please call [phone]        Follow-ups after your visit        Your next 10 appointments already scheduled     May 05, 2017  2:00 PM CDT   Radiology with HI CT SCAN   HI CT Scan ([facility] )    [address] [id number] [phone]            May 12, 2017 10:00 AM CDT   LAB with HC LAB   [facility] ([facility])    [address] [facility] [id number] [phone]            May 12, 2017 10:30 AM CDT   Return Visit with Destiney Booker MD   [facility] ([facility])    [address] [facility] [id number] [phone]              Who to contact     If you have questions or need follow up information about today's clinic visit or your schedule please contact [facility] directly at [phone].  Normal or non-critical lab and imaging results will be communicated to you by MyChart, letter or phone within 4 business days after the clinic has received the results. If you do not hear from us within 7 days, please contact the clinic through MyChart or phone. If you have a critical or abnormal lab result, we will notify you by phone as soon as possible.  Submit refill requests through [employer] or call your pharmacy and they will forward the refill request to us. Please allow 3 business days for your refill to be completed.          Additional Information  "About Your Visit        MyChart Information     [employer] gives you secure access to your electronic health record. If you see a primary care provider, you can also send messages to your care team and make appointments. If you have questions, please call your primary care clinic.  If you do not have a primary care provider, please call [phone] and they will assist you.        Care EveryWhere ID     This is your Care EveryWhere ID. This could be used by other organizations to access your [place] medical records  [id number]        Your Vitals Were     Pulse Temperature Respirations Height BMI (Body Mass Index)       70 97.5  F (36.4  C) (Tympanic) 18 1.803 m (5' 11\") 23.43 kg/m2        Blood Pressure from Last 3 Encounters:   04/27/17 133/85   03/20/17 148/76   01/19/17 129/76    Weight from Last 3 Encounters:   04/27/17 76.2 kg (168 lb)   03/20/17 74.4 kg (164 lb)   01/19/17 76.7 kg (169 lb)              We Performed the Following     CT Abdomen Pelvis w Contrast     CT Chest w Contrast     CT Soft Tissue Neck w Contrast        Primary Care Provider Office Phone # Fax #    Juan Kenney,  [phone]       [facility] [place] [address] [facility]  [place] [address]        Thank you!     Thank you for choosing [facility] [place]  for your care. Our goal is always to provide you with excellent care. Hearing back from our patients is one way we can continue to improve our services. Please take a few minutes to complete the written survey that you may receive in the mail after your visit with us. Thank you!             Your Updated Medication List - Protect others around you: Learn how to safely use, store and throw away your medicines at www.disposemymeds.org.          This list is accurate as of: 4/27/17  1:26 PM.  Always use your most recent med list.                   Brand Name Dispense Instructions for use    aspirin 81 MG tablet      Take  by mouth daily.       calcium " 600/vitamin D 600-400 MG-UNIT per tablet   Generic drug:  calcium-vitamin D      Take 1 tablet by mouth daily.       CoQ10 100 MG Caps      Take  by mouth daily.       fish oil-omega-3 fatty acids 1000 MG capsule      Take 1 capsule by mouth daily.       Garlic 1000 MG Caps      Take  by mouth daily.       GLUCOSAMINE CHONDR COMPLEX PO      Take  by mouth daily.       iron 325 (65 FE) MG tablet      Take 1 tablet by mouth daily.       lisinopril 30 MG tablet    PRINIVIL,ZESTRIL    90 tablet    TAKE 1 TABLET DAILY BY MOUT H       MAGNESIUM OXIDE PO      Take 400 mg by mouth       MULTI-B COMPLEX PO      Take  by mouth daily.       MULTIPLE VITAMIN PO      Take  by mouth daily.       multivitamin Tabs tablet      Take 1 tablet by mouth daily

## 2017-05-05 ENCOUNTER — HOSPITAL ENCOUNTER (OUTPATIENT)
Dept: CT IMAGING | Facility: HOSPITAL | Age: 67
Discharge: HOME | End: 2017-05-05
Attending: NURSE PRACTITIONER | Admitting: NURSE PRACTITIONER
Payer: MEDICARE

## 2017-05-05 PROCEDURE — 71260 CT THORAX DX C+: CPT | Mod: TC

## 2017-05-05 PROCEDURE — 70491 CT SOFT TISSUE NECK W/DYE: CPT | Mod: TC

## 2017-05-05 PROCEDURE — 74177 CT ABD & PELVIS W/CONTRAST: CPT | Mod: TC

## 2017-05-05 RX ORDER — IOPAMIDOL 612 MG/ML
50 INJECTION, SOLUTION INTRAVASCULAR ONCE
Status: COMPLETED | OUTPATIENT
Start: 2017-05-05 | End: 2017-05-05

## 2017-05-05 RX ORDER — IOPAMIDOL 612 MG/ML
100 INJECTION, SOLUTION INTRAVASCULAR ONCE
Status: COMPLETED | OUTPATIENT
Start: 2017-05-05 | End: 2017-05-05

## 2017-05-05 RX ADMIN — DIATRIZOATE MEGLUMINE AND DIATRIZOATE SODIUM 30 ML: 660; 100 SOLUTION ORAL; RECTAL at 14:32

## 2017-05-05 RX ADMIN — IOPAMIDOL 100 ML: 612 INJECTION, SOLUTION INTRAVASCULAR at 14:32

## 2017-05-05 RX ADMIN — IOPAMIDOL 50 ML: 612 INJECTION, SOLUTION INTRAVASCULAR at 14:35

## 2017-05-12 ENCOUNTER — TELEPHONE (OUTPATIENT)
Dept: ONCOLOGY | Facility: OTHER | Age: 67
End: 2017-05-12

## 2017-05-12 ENCOUNTER — ONCOLOGY VISIT (OUTPATIENT)
Dept: ONCOLOGY | Facility: OTHER | Age: 67
End: 2017-05-12
Attending: INTERNAL MEDICINE
Payer: COMMERCIAL

## 2017-05-12 VITALS
WEIGHT: 169 LBS | HEART RATE: 71 BPM | SYSTOLIC BLOOD PRESSURE: 142 MMHG | TEMPERATURE: 96.9 F | BODY MASS INDEX: 23.66 KG/M2 | HEIGHT: 71 IN | DIASTOLIC BLOOD PRESSURE: 82 MMHG | OXYGEN SATURATION: 98 % | RESPIRATION RATE: 16 BRPM

## 2017-05-12 DIAGNOSIS — K63.89 MESENTERIC MASS: ICD-10-CM

## 2017-05-12 DIAGNOSIS — C91.10 CLL (CHRONIC LYMPHOCYTIC LEUKEMIA) (H): ICD-10-CM

## 2017-05-12 DIAGNOSIS — C91.10 CLL (CHRONIC LYMPHOCYTIC LEUKEMIA) (H): Primary | ICD-10-CM

## 2017-05-12 LAB
BASOPHILS # BLD AUTO: 1.3 10E9/L (ref 0–0.2)
BASOPHILS NFR BLD AUTO: 2 %
DIFFERENTIAL METHOD BLD: ABNORMAL
EOSINOPHIL # BLD AUTO: 0 10E9/L (ref 0–0.7)
EOSINOPHIL NFR BLD AUTO: 0 %
ERYTHROCYTE [DISTWIDTH] IN BLOOD BY AUTOMATED COUNT: 13.9 % (ref 10–15)
HCT VFR BLD AUTO: 37.8 % (ref 40–53)
HGB BLD-MCNC: 13 G/DL (ref 13.3–17.7)
LYMPHOCYTES # BLD AUTO: 59.8 10E9/L (ref 0.8–5.3)
LYMPHOCYTES NFR BLD AUTO: 91 %
MCH RBC QN AUTO: 30.6 PG (ref 26.5–33)
MCHC RBC AUTO-ENTMCNC: 34.4 G/DL (ref 31.5–36.5)
MCV RBC AUTO: 89 FL (ref 78–100)
MONOCYTES # BLD AUTO: 0.7 10E9/L (ref 0–1.3)
MONOCYTES NFR BLD AUTO: 1 %
NEUTROPHILS # BLD AUTO: 3.9 10E9/L (ref 1.6–8.3)
NEUTROPHILS NFR BLD AUTO: 6 %
PLATELET # BLD AUTO: 136 10E9/L (ref 150–450)
RBC # BLD AUTO: 4.25 10E12/L (ref 4.4–5.9)
WBC # BLD AUTO: 65.7 10E9/L (ref 4–11)

## 2017-05-12 PROCEDURE — 99212 OFFICE O/P EST SF 10 MIN: CPT

## 2017-05-12 PROCEDURE — 36415 COLL VENOUS BLD VENIPUNCTURE: CPT | Mod: ZL | Performed by: NURSE PRACTITIONER

## 2017-05-12 PROCEDURE — 85025 COMPLETE CBC W/AUTO DIFF WBC: CPT | Mod: ZL | Performed by: NURSE PRACTITIONER

## 2017-05-12 PROCEDURE — 99215 OFFICE O/P EST HI 40 MIN: CPT | Performed by: INTERNAL MEDICINE

## 2017-05-12 NOTE — PROGRESS NOTES
HEMATOLOGY ONCOLOGY CLINIC NOTE       DATE OF VISIT:  2017.      HISTORY OF PRESENT ILLNESS:  Mr. Ulysses Purcell returns for followup of CLL.  We had seen the patient in consultation on 2013.  At that time, he had undergone routine physical exam performed by Dr. Juan Kenney.  As part of the workup, CBC was obtained and revealed a white count of 18.9 with 69.6% lymphocytes.  H&H was 14.8 and 42.3, platelet count was 158,000.  The patient underwent peripheral blood smear reviewing the findings were that the patient had lymphocytosis consistent with chronic lymphocytic leukemia/small lymphocytic lymphoma with mild thrombocytopenia.  The patient was subsequently seen by Dr. José Luis Courtney who referred the patient to us.  We obtained peripheral blood flow cytometry and this was read by Dr. Aquino at the  as an abnormal B-cell population consistent with B-cell CLL.  The patient was CD5 positive, CD10 negative as well as CD23 positive with 47% of cells expressing CD38, 80% B cells revealed levels of ZAP-70 compared to T lymphocytes, serum protein electrophoresis was negative.  Otherwise, patient is asymptomatic.  He did note that he had a brother with hairy cell leukemia, another brother  at age 3 of metastatic adenocarcinoma of the adrenal gland with multiple metastases including pancreas and skin.  The patient would not be a be a candidate for therapy given the fact he was asymptomatic but nonetheless wanted to refer him to Dr. Shantanu Stubbs CLL expert at the  to evaluate prognosis.  Dr. Stubbs did see the patient on 10/16/2013 and his recommendation was evaluation was that the patient likely had stage II CLL and there was mild splenomegaly on PET scan, but he could barely palpate a spleen tip.  He noted the best way to determine prognosis in CLL was the behavior over the course of next several months to a year.  He felt if there was no change in the course of the next several months to years  and that there was no change in lymphocytosis, organomegaly or other features, he felt that disease would be relatively indolent and recommended CBCs every 3 months along with physical exams for nodes and organomegaly.  Recommended CT scans.        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 Stubbs felt this would not change his plan and that the course of disease would be determined over the first year in terms of activity.  In the interim, the patient is essentially asymptomatic and followed by Nurse Sweetie Practitioner with serial CBCs and did have staging studies on 05/13/2014.  CT chest was negative.  CT of the abdomen and pelvis was negative.  There was a mildly enlarged spleen that was 14 cm.  No significant change from previous scans.  CBC had remained relatively stable.  Most recently he was seen at the  by Dermatology for evaluation of benign skin lesion on the thigh.  Pathology was benign for verruca.  Otherwise, the patient had been doing reasonably well.  He remained asymptomatic.  He did see Nurse Sweetie Practitioner on 01/15/2016.  He did complain of a lump on his left breast.  This prompted an ultrasound of the left breast and this came back significant for gynecomastia.  No discrete masses are seen.  Since then he has been followed by Nurse Sweetie Practitioner who last saw the patient on 04/27/2017.  At that time his CBC revealed a white count of 72.4 with 87% lymphocytes, H&H 13.5 and 40.2, platelet count is 152.  His LDH was elevated at 334.  This prompted staging studies which were performed on 05/05/2017.  This included CT neck, chest, abdomen and pelvis.  CT of the abdomen and pelvis revealed that there was a microlobulated confluent diamond mass within the SMA mesentery measuring up to 7.7 x 4.2 x 10.1 cm.  There were prominent periaortic lymph nodes, which were worse when compared to prior.  The spleen had  demonstrated trachea enlargement and increased from 15.8 cm to 17.4 cm.  The chest did not reveal any mediastinal adenopathy.  There was borderline ectatic thoracic aorta measuring up to 3.5 cm in the ascending segment.  There was an increased number of small bilateral axillary nodes.  CT neck revealed diffuse cervical adenopathy with enlargement of shotty lymph nodes throughout the neck.  Noted on prior study, the largest was 1.8 cm in the right jugulodigastric region and the right level 2B lymph node measured 11.4 mm.  The patient describes a 10-15 pound weight loss over the past year.  He is somewhat fatigued at the end of the day, but otherwise denies any fevers, night sweats or abdominal pain or early satiety or shortness of breath.  He does appear to have lost more than 15 pounds as he looks much thinner than in the past.  Denies any headaches, blurred vision.  He says he is recently recovering from a viral URI manifested by nasal congestion, postnasal drip and some ear complaints, which have resolved.      PHYSICAL EXAMINATION:   GENERAL:  He is a middle-aged white male in no acute distress.   VITAL SIGNS:  Blood pressure 142/82, pulse 71, respirations 16, temperature 96.9.   HEENT:  Atraumatic, normocephalic.  Oropharynx nonerythematous.   NECK:  Supple, with some palpable adenopathy on the right anterior cervical region at approximately 1.5 cm as well as 1 left cervical node that is palpable approximately 1 cm.   LUNGS:  Clear to auscultation and percussion.   HEART:  Regular rhythm, S1 is normal.   ABDOMEN:  Soft.  There is a palpable spleen approximately 45 fingerbreadths below the left costal margin.  There was palpable mass just to the right of the umbilicus.   LYMPHATICS:  Cervical adenopathy as above.  There were no axillary or inguinal nodes palpable.     EXTREMITIES:  Trace ankle edema.   NEUROLOGIC:  Nonfocal.      LABORATORY DATA:  From today, CBC with white count of 65.7 with 91% lymphocytes, H&H  13.0 and 37.8, platelet count 136.      IMPRESSION:  Stage II CLL with worsening lymphocytosis and now patient has become symptomatic with weight loss, fatigue and staging studies indicate a new 10 cm microlobulated diamond mass in the upper abdomen, also worsening splenomegaly.  He had some shotty cervical adenopathy, no axillary adenopathy.  We discussed the case with Dr. Koffi Hall, who was covering for Dr. Stubbs of leukemia, lymphoma and he felt that we should proceed with biopsy of this mass to rule out diffuse large B-cell lymphoma as this may be a transformed chronic lymphocytic leukemia.  We are writing therefore to consult with Surgical Oncology at the [facility] with Dr. rBian Lambert for surgical biopsy of this mass.  Also Dr. Hall recommended obtaining TP53 mutation status on the blood.  If the patient has chronic lymphocytic leukemia he may be a candidate for Imbruvica therapy if his TP53 is positive, otherwise we will see the patient after the biopsy obtained and repeat CBC, CMP, LDH.      Forty minutes spent with the patient, half the time was spent in counseling concerning his CT findings of the new mesenteric diamond mass in the upper abdomen.  He may have diffuse large B cell lymphoma.  Time was spent in consulting with Dr. Koffi Hall as well as ordering consultation with Dr. Brian Lambert as well as ordering followup last.      We will see the patient in 1 week after biopsy.         LYNDSEY UGARTE MD             D: 2017 12:51   T: 2017 17:09   MT: LAMAR      Name:     NAVEEN MG   MRN:      [id number]        Account:      [id number]   :      1950           Visit Date:   2017      Document: [id number]       cc: Shantanu Kenney DO

## 2017-05-12 NOTE — NURSING NOTE
"Chief Complaint   Patient presents with     RECHECK     CLL/scans       Initial /82  Pulse 71  Temp 96.9  F (36.1  C) (Tympanic)  Resp 16  Ht 1.803 m (5' 10.98\")  Wt 76.7 kg (169 lb)  SpO2 98%  BMI 23.58 kg/m2 Estimated body mass index is 23.58 kg/(m^2) as calculated from the following:    Height as of this encounter: 1.803 m (5' 10.98\").    Weight as of this encounter: 76.7 kg (169 lb).  Medication Reconciliation: complete     Patient was assessed using the NCCN psychosocial distress thermometer. Patient rated the score as a 0. Patient rated current stressors as NA. Stressors will be brought to the attention of provider or Oncology RN Care Coordinator for a score of 6 or greater or per nurses discretion.     Zoey Carreon            "

## 2017-05-12 NOTE — MR AVS SNAPSHOT
After Visit Summary   5/12/2017    Ulysses Purcell    MRN: [id number]           Patient Information     Date Of Birth          1950        Visit Information        Provider Department      5/12/2017 10:30 AM Destiney Booker MD [facility]        Today's Diagnoses     CLL (chronic lymphocytic leukemia) (H)    -  1    Mesenteric mass          Care Instructions    We would like to see you back in after your appointment at the [facility] with  for a surgical consult/biopsy.  Please come 30 minute(s) prior for lab work.We will call you with your appointment time and date.   call your pharmacy and they will send us the request. If you have any questions please call [phone]          Follow-ups after your visit        Additional Services     Surgical Oncology Adult IP Consult       [facility] with Dr. Brian Lambert-surgical oncology for biopsy of mesenteric mass.Rule out diffuse large cell lymphoma.                  Future tests that were ordered for you today     Open Future Orders        Priority Expected Expires Ordered    Next Generation Sequencing Oncology: Individual Genes; TP53 Routine  5/12/2018 5/12/2017    CBC with platelets differential Routine 5/23/2017 7/26/2017 5/12/2017    Comprehensive metabolic panel Routine 5/23/2017 7/26/2017 5/12/2017    Lactate Dehydrogenase Routine 5/23/2017 7/26/2017 5/12/2017            Who to contact     If you have questions or need follow up information about today's clinic visit or your schedule please contact [facility] directly at [phone].  Normal or non-critical lab and imaging results will be communicated to you by MyChart, letter or phone within 4 business days after the clinic has received the results. If you do not hear from us within 7 days, please contact the clinic through MyChart or phone. If you have a critical or abnormal lab result, we will notify you by phone as soon as  "possible.  Submit refill requests through [employer] or call your pharmacy and they will forward the refill request to us. Please allow 3 business days for your refill to be completed.          Additional Information About Your Visit        [employer] Information     [employer] gives you secure access to your electronic health record. If you see a primary care provider, you can also send messages to your care team and make appointments. If you have questions, please call your primary care clinic.  If you do not have a primary care provider, please call [phone] and they will assist you.        Care EveryWhere ID     This is your Care EveryWhere ID. This could be used by other organizations to access your [place] medical records  [id number]        Your Vitals Were     Pulse Temperature Respirations Height Pulse Oximetry BMI (Body Mass Index)    71 96.9  F (36.1  C) (Tympanic) 16 1.803 m (5' 10.98\") 98% 23.58 kg/m2       Blood Pressure from Last 3 Encounters:   05/12/17 142/82   04/27/17 133/85   03/20/17 148/76    Weight from Last 3 Encounters:   05/12/17 76.7 kg (169 lb)   04/27/17 76.2 kg (168 lb)   03/20/17 74.4 kg (164 lb)              We Performed the Following     Surgical Oncology Adult IP Consult        Primary Care Provider Office Phone # Fax #    Juan Kenney [phone]       [facility] [place] [address] [facility] [address]        Thank you!     Thank you for choosing [facility]  for your care. Our goal is always to provide you with excellent care. Hearing back from our patients is one way we can continue to improve our services. Please take a few minutes to complete the written survey that you may receive in the mail after your visit with us. Thank you!             Your Updated Medication List - Protect others around you: Learn how to safely use, store and throw away your medicines at www.disposemymeds.org.          This list is accurate as of: 5/12/17 " 12:02 PM.  Always use your most recent med list.                   Brand Name Dispense Instructions for use    aspirin 81 MG tablet      Take  by mouth daily.       calcium 600/vitamin D 600-400 MG-UNIT per tablet   Generic drug:  calcium-vitamin D      Take 1 tablet by mouth daily.       CoQ10 100 MG Caps      Take  by mouth daily.       fish oil-omega-3 fatty acids 1000 MG capsule      Take 1 capsule by mouth daily.       Garlic 1000 MG Caps      Take  by mouth daily.       GLUCOSAMINE CHONDR COMPLEX PO      Take  by mouth daily.       iron 325 (65 FE) MG tablet      Take 1 tablet by mouth daily.       lisinopril 30 MG tablet    PRINIVIL,ZESTRIL    90 tablet    TAKE 1 TABLET DAILY BY MOUT H       MAGNESIUM OXIDE PO      Take 400 mg by mouth       MULTI-B COMPLEX PO      Take  by mouth daily.       MULTIPLE VITAMIN PO      Take  by mouth daily.       multivitamin Tabs tablet      Take 1 tablet by mouth daily       UNABLE TO FIND      MEDICATION NAME: lipoflavanoid for sinus

## 2017-05-12 NOTE — TELEPHONE ENCOUNTER
DATE:  5/12/2017   TIME OF RECEIPT FROM LAB:  8:49  LAB TEST:  WBC  LAB VALUE:  65.7  RESULTS GIVEN WITH READ-BACK TO (PROVIDER):  [facility]  TIME LAB VALUE REPORTED TO PROVIDER:   8:50    Pretty Davenport

## 2017-05-12 NOTE — PATIENT INSTRUCTIONS
We would like to see you back in after your appointment at the [facility] with  for a surgical consult/biopsy.  Please come 30 minute(s) prior for lab work.We will call you with your appointment time and date.   call your pharmacy and they will send us the request. If you have any questions please call [phone]

## 2017-05-15 DIAGNOSIS — C91.10 CLL (CHRONIC LYMPHOCYTIC LEUKEMIA) (H): ICD-10-CM

## 2017-05-15 DIAGNOSIS — K63.89 MESENTERIC MASS: ICD-10-CM

## 2017-05-15 PROCEDURE — 81405 MOPATH PROCEDURE LEVEL 6: CPT | Mod: ZL,GZ | Performed by: INTERNAL MEDICINE

## 2017-05-15 PROCEDURE — 36415 COLL VENOUS BLD VENIPUNCTURE: CPT | Mod: ZL | Performed by: INTERNAL MEDICINE

## 2017-05-15 PROCEDURE — 99000 SPECIMEN HANDLING OFFICE-LAB: CPT | Performed by: INTERNAL MEDICINE

## 2017-05-16 ENCOUNTER — PRE VISIT (OUTPATIENT)
Dept: SURGERY | Facility: CLINIC | Age: 67
End: 2017-05-16

## 2017-05-16 NOTE — TELEPHONE ENCOUNTER
PREVISIT INFORMATION                                                    Ulysses KARLENE Purcell scheduled for future visit at [facility] specialty clinics.    Patient is scheduled to see Dr. Goodrich on 5/22/17 at 2:00pm  Reason for visit: consult to discuss biopsy of mesenteric mass  Referring provider: Dr. Schneider  Has patient seen previous specialist? No  Medical Records:  Available in chart.  Patient was previously seen at a [place] or [facility] facility.    REVIEW                                                      New patient packet mailed to patient: N/A  Medication reconciliation complete: No      Current Outpatient Prescriptions   Medication Sig Dispense Refill     UNABLE TO FIND MEDICATION NAME: lipoflavanoid for sinus       lisinopril (PRINIVIL,ZESTRIL) 30 MG tablet TAKE 1 TABLET DAILY BY MOUT H 90 tablet 2     multivitamin (OCUVITE) TABS Take 1 tablet by mouth daily       MAGNESIUM OXIDE PO Take 400 mg by mouth       aspirin 81 MG tablet Take  by mouth daily.       MULTIPLE VITAMIN PO Take  by mouth daily.       calcium-vitamin D (CALCIUM 600/VITAMIN D) 600-400 MG-UNIT per tablet Take 1 tablet by mouth daily.       fish oil-omega-3 fatty acids (FISH OIL) 1000 MG capsule Take 1 capsule by mouth daily.        Garlic 1000 MG CAPS Take  by mouth daily.       Coenzyme Q10 (COQ10) 100 MG CAPS Take  by mouth daily.       Glucosamine-Chondroitin (GLUCOSAMINE CHONDR COMPLEX PO) Take  by mouth daily.       Ferrous Sulfate (IRON) 325 (65 FE) MG tablet Take 1 tablet by mouth daily.       B Complex-Biotin-FA (MULTI-B COMPLEX PO) Take  by mouth daily.         Allergies: Review of patient's allergies indicates no known allergies.    PLAN/FOLLOW-UP NEEDED                                                      Per appointment notes, patient being seen for biopsy of mesenteric mass. Patient driving a long distance. Will route message to Dr. Goodrich to review and if possible to coordinate consult and  biopsy on the same day.     Maura Coyne RN, BSN  [facility]  Urology/General Surgery

## 2017-05-16 NOTE — TELEPHONE ENCOUNTER
Received message from Dr. Goodrich stating that this biopsy will need to be completed at the [place], so we are unable to coordinate biopsy and consult on the same day.     Called and spoke to patient who is aware of consult appointment with Dr. Goodrich on 5/22/17 at 2:00pm. Patient will call with any questions and if the appointment needs to be cancelled or rescheduled.    Mauar Coyne RN, BSN  [facility]  Urology/General Surgery

## 2017-05-22 ENCOUNTER — OFFICE VISIT (OUTPATIENT)
Dept: SURGERY | Facility: CLINIC | Age: 67
End: 2017-05-22
Payer: COMMERCIAL

## 2017-05-22 VITALS — HEART RATE: 65 BPM | SYSTOLIC BLOOD PRESSURE: 155 MMHG | DIASTOLIC BLOOD PRESSURE: 94 MMHG

## 2017-05-22 DIAGNOSIS — R59.0 MESENTERIC LYMPHADENOPATHY: Primary | ICD-10-CM

## 2017-05-22 DIAGNOSIS — C91.10 CLL (CHRONIC LYMPHOCYTIC LEUKEMIA) (H): ICD-10-CM

## 2017-05-22 DIAGNOSIS — C91.10 CLL (CHRONIC LYMPHOCYTIC LEUKEMIA) (H): Primary | ICD-10-CM

## 2017-05-22 PROCEDURE — 99203 OFFICE O/P NEW LOW 30 MIN: CPT | Performed by: SURGERY

## 2017-05-22 RX ORDER — ACETAMINOPHEN 325 MG/1
975 TABLET ORAL ONCE
Status: CANCELLED | OUTPATIENT
Start: 2017-05-22 | End: 2017-05-22

## 2017-05-22 NOTE — LETTER
2017      RE: Ulysses Purcell  [address] [place] [address]     May 22, 2017    Destiney Booker MD  [facility]  [address]  [place] [address]    RE: Ulysses Purcell  (: 1950)    Dear Dr. Booker    Your patient was seen for evaluation in my office.  Please find a copy of my notes for your record and review.  If you have any further questions, please feel free to contact my office.   Thank you for your kind referral.    Sincerely,   Melba Goodrich MD MSc [facility]    ---       NEW CONSULTATION  May 22, 2017    Ulysses Purcell is a 66 year old male who presents with mesenteric adenopathy.  He was referred by Dr Booker.    HPI:    He was diagnosed with chronic lymphocytic leukemia in .  He was asymptomatic at the time and the recommendation was watchful waiting given the indolent nature of the disease.  In 2017, his WBC was found to be 72, which prompted repeat CT neck/chest/abdomen/pelvis.    He denies any night sweats, fever/chills, and has a good appetite.    Past Medical History:   Diagnosis Date     Chronic lymphocytic leukemia (CLL), T-cell (H)     white counts in the 34,000 to 40,000      Elevated blood pressure reading without diagnosis of hypertension 10/16/2002     Fracture of metatarsal bone(s), closed 10/29/2001    right 5th metatarsal bone     Hypertension      Other orthopedic aftercare(V54.89) 2001     Routine general medical examination at [facility] 2000     Special screening for malignant neoplasms, colon 2004   No GA concerns  No history of MI or stroke.    Past Surgical History:   Procedure Laterality Date     BACK SURGERY      L4-L5 partial laminectomy     COLONOSCOPY  2004    No polyps     COLONOSCOPY  2014    Procedure: COLONOSCOPY;  Surgeon: Meliza Morales MD;  Location: [facility]      LEFT HEART CATHETERIZATION  05/10/2005    Normal angiogram     LAMINECTOMY CERIVCAL POSTERIOR ONE LEVEL     No prior abdominal  surgery.    Current Outpatient Prescriptions   Medication Sig Dispense Refill     UNABLE TO FIND MEDICATION NAME: lipoflavanoid for sinus       lisinopril (PRINIVIL,ZESTRIL) 30 MG tablet TAKE 1 TABLET DAILY BY MOUT H 90 tablet 2     multivitamin (OCUVITE) TABS Take 1 tablet by mouth daily       MAGNESIUM OXIDE PO Take 400 mg by mouth       aspirin 81 MG tablet Take  by mouth daily.       MULTIPLE VITAMIN PO Take  by mouth daily.       calcium-vitamin D (CALCIUM 600/VITAMIN D) 600-400 MG-UNIT per tablet Take 1 tablet by mouth daily.       fish oil-omega-3 fatty acids (FISH OIL) 1000 MG capsule Take 1 capsule by mouth daily.        Garlic 1000 MG CAPS Take  by mouth daily.       Coenzyme Q10 (COQ10) 100 MG CAPS Take  by mouth daily.       Glucosamine-Chondroitin (GLUCOSAMINE CHONDR COMPLEX PO) Take  by mouth daily.       Ferrous Sulfate (IRON) 325 (65 FE) MG tablet Take 1 tablet by mouth daily.       B Complex-Biotin-FA (MULTI-B COMPLEX PO) Take  by mouth daily.           No Known Allergies     SOCIAL HISTORY:  Smokes: No  EtOH: Yes, 2 glasses of wine per day  Illicit drugs: No      ROS see above    BP (!) 155/94  Pulse 65   Physical Exam   Constitutional: He is well-developed, well-nourished, and in no distress.   Abdominal: Soft. Normal appearance. He exhibits no mass. There is no tenderness.   Lymphadenopathy:     He has no cervical adenopathy.        Right cervical: No superficial cervical, no deep cervical and no posterior cervical adenopathy present.       Left cervical: No superficial cervical, no deep cervical and no posterior cervical adenopathy present.     He has no axillary adenopathy.        Right axillary: No pectoral and no lateral adenopathy present.        Left axillary: No pectoral and no lateral adenopathy present.       Right: No inguinal, no supraclavicular and no epitrochlear adenopathy present.        Left: No inguinal, no supraclavicular and no epitrochlear adenopathy present.   Skin: Skin is  warm and dry.        INVESTIGATIONS:    CT neck/chest/abdomen/pelvis (5/5/2017) showed:  - Diffuse cervical adenopathy, the largest measuring 1.8 cm  - 10 cm diamond mass near the SMA  - periaortic lymphadenopathy  - stable right adrenal mass  - splenomegaly (17.4 cm)    ASSESSMENT:  Ulysses Purcell is a 66 year old male with mesenteric lymphadenopathy in the setting of CLL, with concerns for transformation to a diffuse large B-cell lymphoma.    We reviewed the risks and benefits of a laparoscopic, possible open, incisional mesenteric lymph node biopsy, including bleeding, wound infection, wound dehiscence, hernia formation, injury to surrounding structures, including the bowel.  We reviewed that a piece of the lymph node would be removed for pathologic diagnosis, rather than removal of the entire mass.      All of the above was discussed with Ulysses Purcell and his wife.  All questions were answered. They did seem to understand.    Total time spent with the patient was 30 minutes, of which more than half was counseling.     PLAN:  1.  Diagnostic laparoscopy, laparoscopic incisional biopsy of a mesenteric lymph node, possible open.  2.  Patient to report to his PCP for preoperative H&P and testing.     Melba Goodrich MD MSc [facility]    Division of Surgical Oncology  [facility]

## 2017-05-22 NOTE — PATIENT INSTRUCTIONS
Surgery Instructions    Always follow your surgeon s instructions. If you don t, your surgery could be cancelled. Please use the following checklist.  Your surgery is on: The surgery scheduler will contact you within 1 week of your consult with the surgeon. If you do not hear from them, please call the clinic or RN Care Coordinator for your provider.    Time: Prearrival times can vary depending on location/type of surgery.  [place] - 2 hour pre-arrival  [facility] - 2 hour pre-arrival  [place] - 1 hour pre-arrival    Note:  These times may change. A nurse will call you before surgery to confirm. If you have not received a call or if you have more questions, please call us on the working day before your surgery:  ? [place]: [phone] or [phone] (9am to 5:30pm)  ? [facility]: [phone] (8am to 6pm)  ? [place]: [phone] (9am to 5pm)  ? [facility] [phone] (7am to 4pm)  Prior to surgery  ? Have a pre-op physical exam with your Primary Doctor within 30 days of surgery  - Ask your doctor to send all of your results to the surgery center/hospital before surgery. Your doctor also may ask you to bring the results with you on the day of surgery.  - Tell your doctor if:  - You are allergic to latex or rubber (latex and rubber gloves are often used in medical care).  - You are taking any medicines (including aspirin), vitamins, or herbal products. You may need to stop taking some medicines before surgery.  - You have any medical problems (allergies, diabetes, or heart disease, for example).  - You have a pacemaker or an AICD (automatic implanted cardiac defibrillator). If you do, please bring the ID card with you on the day of surgery.  - You are a smoker. People who smoke have a higher risk of infection after surgery. Ask your doctor how you can quit smoking.  - If you Primary Doctor is not within the apomio system, you will need to have your pre-op physical faxed to us to be scanned  into your chart.  - [place]: [phone] or [phone]  - [facility] ([place]): [phone]  - [facility] ([facility]): [phone]  ? Call your insurance company. Ask if you need pre-approval for your surgery. If you do not have insurance, please let us know. If you wish to speak to the , please alert the clinic staff so this can be arranged.  ? Arrange for someone to drive you home after surgery.  will need to be a responsible adult (18 years or older) that will provide transportation to and from surgery and stay in the waiting room during your surgery. You may not drive yourself or take public transportation to and from surgery.  ? Arrange for someone to stay with you for 24 hours after you go home. This person must be a responsible adult (18 years or older).  ? Call your surgeon or their nurse if there is any change in your health (cold, flu, infections, hospitalizations).  ? Do not smoke, drink alcohol, or take over-the-counter medicine for 24 hours before and after surgery.  ? If you take prescribed drugs, you may need to stop them until after the surgery.  Discuss what medications to take or not take prior to surgery with your Primary Doctor at your pre-op physical. Avoid over-the-counter blood-thinning medications such as Aspirin, Ibuprofen, vitamin E, or fish oil 7 days prior to surgery (unless otherwise directed by your Primary Doctor). Tylenol is a good alternative for mild pain relief prior to surgery.  ? Eating and drinking guidelines prior to surgery:  - Stop all solid food consumption 8 hours prior to surgery  - You may drink clear liquids up to 2 hours prior to surgery (water, fruit juices without pulp, jello, tea/coffee without creamer, sports drinks, clear-fat free broth (bouillon or consomme), popsicles (without milk, bits of fruit, or seeds/nuts)  ? Follow instructions given for showering or bathing before surgery.    - Use 8 ounces of antiseptic  surgical soap, like:  - Hibiclens, Scrub Care, or Exidine  - You can find it at your local pharmacy, clinic, or retail store. If you have trouble, ask your pharmacist to help you find the right substitute.  - Please wash with one of the above soaps twice before coming to the hospital for your surgery. This will decrease bacteria (germs) on your skin. It will also help reduce your chance of infection after surgery.  - Items you will need for showering:  - 4 newly washed washcloths  - 2 newly washed towels  - 8 ounces of one of the above soaps  - Following these instructions:  - The evening before surgery: Shower or bathe as you normally would, using your regular soap and a clean washcloth. Give special attention to places where your incision (surgical cut) or catheters will be. This includes your groin area. Rinse well. You may wash your hair with your regular shampoo. Next, wash your body with 4 ounces of the antiseptic soap. Use a clean, damp washcloth and gently clean your body (from the chin down). If your surgery involves your head, use the special soap on your head and scalp. Rinse well and dry off using a newly washed towel.  - The morning of surgery: Repeat the same process as the evening shower.  - Other suggestions:    Do not shave within 12 inches of your incision (surgical cut) area for at least 3 days before surgery. Shaving can make small cuts in the skin. This puts you at higher risk of infection.    Wear freshly washed pajamas or clothing after your evening shower.    Wear freshly washed clothes the day of surgery.    Wash and change your bed sheets the day before surgery to have clean bed sheets after your shower and when you get home from surgery.    If you have trouble washing all areas, make sure someone helps you.    Don't use any deodorant, lotion or powder after your shower.    Women who are menstruating should wear a fresh sanitary pad to the hospital.  ? Do not wear or add deodorant, cologne,  lotion, makeup, nail polish or jewelry to surgery. If you wear fake nails, please remove at least one nail before coming to surgery (an oxygen monitor needs to be placed on your finger during surgery).  ? Bring these items to the surgery center/hospital:  - Insurance card  - Money for parking and co-pays, if needed  - A list of all the medicines you take. Include vitamins, minerals, herbs, and over-the-counter drugs.  Note any drug allergies.  - A copy of your advance health care directive, if you have one. This tells us what treatment you would want--and who would make health care decisions--if you could no longer speak for yourself. You may request this form in advance or download it from [email or web address].  - A case for glasses, contact lenses, hearing aids, or dentures.  - Your inhaler or CPAP machine, if you use these at home.  ? Leave extra cash, jewelry, and other valuables at home.  When you arrive  When you get to the surgery center/hospital, you will:  ? Check in. If you are under age 18, you must be with a parent or legal guardian.  ? Sign consent forms, if you haven t already. These forms state that you know the risks and benefits of surgery. When you sign the forms, you give us permission to do the surgery. Do not sign them unless you understand what will happen during and after your surgery. If you have any questions about your surgery, ask to speak with your doctor before you sign the forms. If you don t understand the answers, ask again.  ? Receive a copy of the Patient s Bill of Rights. If you do not receive a copy, please ask for one.  ? Change into hospital clothes. Your belongings will be placed in a bag. We will return them to you after surgery.  ? Meet with the anesthesia provider. He or she will tell you what kind of anesthesia (medicine) will be used to keep you comfortable during surgery.  Remember: it s okay to remind doctors and nurses to wash their hands before touching you.  In  most cases, your surgeon will use a marker to write his or her initials on the surgery site. This ensures that the exact site is operated on.  For safety reasons, we will ask you the same questions many times. For example, we may ask your name and birth date over and over again.  Friends and family can stay with you until it s time for surgery. While you re in surgery, they will be in the waiting area. Please note that cell phones are not allowed in some patient care areas.  If you have questions about what will happen in the operating room, talk to your care team.  After surgery  We will move you to a recovery room, where we will watch you closely. If you have any pain or discomfort, tell your nurse. He or she will try to make you comfortable.  If you are staying overnight, we will move you to your hospital room after you are awake.  If you are going home, we will move you to another room. Friends and family may be able to join you. The length of time you spend in recovery depend on the type of medicine you received, your medical condition, the type of surgery you had, or your response to the anesthesia given during your procedure.  When you are discharged from the recovery room, the nurses will review instructions with you and your caregiver.  ? Please wash your hands every time you touch the wound or change bandages or dressings.  ? Do not submerge the wound in water.  You may not use a bathtub or hot tub until the wound is closed. The wait time frame is generally 2-3 weeks, but any open area can be a source of incoming bacteria, so it is better to be on the safe side and avoid water submersion until your wound is fully healed.  ? You may take a shower 24 hours after surgery. Double check with your surgeon if it is OK for water to run over the wound, whether it has been sutured, stapled, glued, or is open. You may gently wash the wound using the antiseptic soap provided for your pre-surgery showering (do not use  a washcloth). Any mild soap will work as well.  ? Many surgical wounds will have small white strips of tape on them called steri-strips.  Do not remove these. The edges will curl and fall off within 7-10 days with normal showering.  ? If you are going home with sutures (stitches) or staples, you must return to the clinic to have them taken out, usually within 1-2 weeks. Some stitches are dissolvable and do not require removal. Make sure to clarify with your surgeon or surgery nurse reviewing discharge paperwork what kind of sutures you have.  ? Signs and symptoms of infection include:  - Fever, temperature over 101.5   F  - Redness  - Swelling  - Increased pain  - Green or yellow drainage which may or may not have a foul odor  Dealing with pain  A nurse will check your comfort level often during your stay. He or she will work with you to manage your pain.  Remember:  ? All pain is real. There are many ways to control pain. We can help you decide what works best for you.  ? Ask for pain medicine when you need it. Don t try to  tough it out --this can make you feel worse. Always take your medicine as ordered.  ? Medicine doesn t work the same for everyone. If your medicine isn t working, tell your nurse. There may be other medicines or treatments we can try.  Going home  We will let you know when you re ready to leave the surgery center or hospital. Before you leave, we will tell you how to care for yourself at home and prevent infections. If you do not understand something, please say so. We will answer any questions you have. We will then help you get ready to leave.  Remember, you must have a responsible adult (18 years or older) to stay with you 24 hours after you leave the hospital.  Take it easy when you get home. You will need some time to recover--you may be more tired than you realize at first. Rest and relax for at least the first 24 hours at home. You ll feel better and heal faster if you take good care of  yourself.  Follow the discharge instructions that are given to you when you leave the surgery center or hospital  Please call the clinic if you experience any problems during regular clinic hours (Monday-Friday 8:00am-5:00pm).  If you experience problems during non-clinic hours, please call the [facility] on-call line at [phone] and ask the  to page the on-call Provider for your specialty. The on-call Provider will call you back and can triage your symptoms and further advise. If you are having an emergency, always call 911 or seek immediate evaluation at the Emergency Room.  Locations  [facility]  Same-day surgery center - 2nd floor, check-in #5  [address] [place] [address]  [phone]  [email or web address] ([facility])  [address] [place], [place] [address] [phone]   [email or web address]    [address] [address] [phone] (patient registration)  [phone] (main line)  [email or web address]  [facility]  [address] [place] [address]  Critical access hospital, 3rd floor for check-in  [phone] (patient registration)  [phone] (main line)  [email or web address]  [facility]  [address] [address]  [phone]  www.[email or web address]  [address] [place] [address]  [phone]  [email or web address]  [address] [place] [address]  [phone]  www.[email or web address]  [address]  [place] [address]  [phone]  [email or web address]  [address]  [place] [address]  [phone]  [email or web address]  [address] [address]  [phone]  [email or web address]

## 2017-05-22 NOTE — PROGRESS NOTES
NEW CONSULTATION  May 22, 2017    Ulysses Purcell is a 66 year old male who presents with mesenteric adenopathy.  He was referred by Dr Booker.    HPI:    He was diagnosed with chronic lymphocytic leukemia in 2013.  He was asymptomatic at the time and the recommendation was watchful waiting given the indolent nature of the disease.  In April 2017, his WBC was found to be 72, which prompted repeat CT neck/chest/abdomen/pelvis.    He denies any night sweats, fever/chills, and has a good appetite.    Past Medical History:   Diagnosis Date     Chronic lymphocytic leukemia (CLL), T-cell (H)     white counts in the 34,000 to 40,000      Elevated blood pressure reading without diagnosis of hypertension 10/16/2002     Fracture of metatarsal bone(s), closed 10/29/2001    right 5th metatarsal bone     Hypertension      Other orthopedic aftercare(V54.89) 11/08/2001     Routine general medical examination at [facility] 12/01/2000     Special screening for malignant neoplasms, colon 02/04/2004   No GA concerns  No history of MI or stroke.    Past Surgical History:   Procedure Laterality Date     BACK SURGERY  1997    L4-L5 partial laminectomy     COLONOSCOPY  02/04/2004    No polyps     COLONOSCOPY  8/4/2014    Procedure: COLONOSCOPY;  Surgeon: Meliza Morales MD;  Location: [facility]     [facility] LEFT HEART CATHETERIZATION  05/10/2005    Normal angiogram     LAMINECTOMY CERIVCAL POSTERIOR ONE LEVEL  1997   No prior abdominal surgery.    Current Outpatient Prescriptions   Medication Sig Dispense Refill     UNABLE TO FIND MEDICATION NAME: lipoflavanoid for sinus       lisinopril (PRINIVIL,ZESTRIL) 30 MG tablet TAKE 1 TABLET DAILY BY MOUT H 90 tablet 2     multivitamin (OCUVITE) TABS Take 1 tablet by mouth daily       MAGNESIUM OXIDE PO Take 400 mg by mouth       aspirin 81 MG tablet Take  by mouth daily.       MULTIPLE VITAMIN PO Take  by mouth daily.       calcium-vitamin D (CALCIUM 600/VITAMIN D) 600-400 MG-UNIT per tablet Take 1  tablet by mouth daily.       fish oil-omega-3 fatty acids (FISH OIL) 1000 MG capsule Take 1 capsule by mouth daily.        Garlic 1000 MG CAPS Take  by mouth daily.       Coenzyme Q10 (COQ10) 100 MG CAPS Take  by mouth daily.       Glucosamine-Chondroitin (GLUCOSAMINE CHONDR COMPLEX PO) Take  by mouth daily.       Ferrous Sulfate (IRON) 325 (65 FE) MG tablet Take 1 tablet by mouth daily.       B Complex-Biotin-FA (MULTI-B COMPLEX PO) Take  by mouth daily.           No Known Allergies     SOCIAL HISTORY:  Smokes: No  EtOH: Yes, 2 glasses of wine per day  Illicit drugs: No      ROS see above    BP (!) 155/94  Pulse 65   Physical Exam   Constitutional: He is well-developed, well-nourished, and in no distress.   Abdominal: Soft. Normal appearance. He exhibits no mass. There is no tenderness.   Lymphadenopathy:     He has no cervical adenopathy.        Right cervical: No superficial cervical, no deep cervical and no posterior cervical adenopathy present.       Left cervical: No superficial cervical, no deep cervical and no posterior cervical adenopathy present.     He has no axillary adenopathy.        Right axillary: No pectoral and no lateral adenopathy present.        Left axillary: No pectoral and no lateral adenopathy present.       Right: No inguinal, no supraclavicular and no epitrochlear adenopathy present.        Left: No inguinal, no supraclavicular and no epitrochlear adenopathy present.   Skin: Skin is warm and dry.        INVESTIGATIONS:    CT neck/chest/abdomen/pelvis (5/5/2017) showed:  - Diffuse cervical adenopathy, the largest measuring 1.8 cm  - 10 cm diamond mass near the SMA  - periaortic lymphadenopathy  - stable right adrenal mass  - splenomegaly (17.4 cm)    ASSESSMENT:  Ulysses Purcell is a 66 year old male with mesenteric lymphadenopathy in the setting of CLL, with concerns for transformation to a diffuse large B-cell lymphoma.    We reviewed the risks and benefits of a laparoscopic, possible  open, incisional mesenteric lymph node biopsy, including bleeding, wound infection, wound dehiscence, hernia formation, injury to surrounding structures, including the bowel.  We reviewed that a piece of the lymph node would be removed for pathologic diagnosis, rather than removal of the entire mass.      All of the above was discussed with Ulysses Purcell and his wife.  All questions were answered. They did seem to understand.    Total time spent with the patient was 30 minutes, of which more than half was counseling.     PLAN:  1.  Diagnostic laparoscopy, laparoscopic incisional biopsy of a mesenteric lymph node, possible open.  2.  Patient to report to his PCP for preoperative H&P and testing.     Melba Goodrich MD MSc [facility]    Division of Surgical Oncology  [facility]

## 2017-05-22 NOTE — NURSING NOTE
"Ulysses Purcell's goals for this visit include: mass abdomen  He requests these members of his care team be copied on today's visit information: no    PCP: Juan Kenney    Referring Provider:  Destiney Booker MD  [facility]  [address] [place] [id number]    Chief Complaint   Patient presents with     Consult     mass on abdomen       Initial There were no vitals taken for this visit. Estimated body mass index is 23.58 kg/(m^2) as calculated from the following:    Height as of 5/12/17: 1.803 m (5' 10.98\").    Weight as of 5/12/17: 76.7 kg (169 lb).  Medication Reconciliation: complete    Do you need any medication refills at today's visit? No    Heidy Moreno LPN      "

## 2017-05-22 NOTE — MR AVS SNAPSHOT
After Visit Summary   5/22/2017    Ulysses Purcell    MRN: [id number]           Patient Information     Date Of Birth          1950        Visit Information        Provider Department      5/22/2017 2:00 PM Melba Goodrich MD [facility]        Care Instructions    Surgery Instructions    Always follow your surgeon s instructions. If you don t, your surgery could be cancelled. Please use the following checklist.  Your surgery is on: The surgery scheduler will contact you within 1 week of your consult with the surgeon. If you do not hear from them, please call the clinic or RN Care Coordinator for your provider.    Time: Prearrival times can vary depending on location/type of surgery.  [place] - 2 hour pre-arrival  [facility] [place] - 2 hour pre-arrival  [place] - 1 hour pre-arrival    Note:  These times may change. A nurse will call you before surgery to confirm. If you have not received a call or if you have more questions, please call us on the working day before your surgery:  ? [place]: [phone] or [phone] (9am to 5:30pm)  ? [facility]: [phone] (8am to 6pm)  ? [place]: [phone] (9am to 5pm)  ? [facility] [phone] (7am to 4pm)  Prior to surgery  ? Have a pre-op physical exam with your Primary Doctor within 30 days of surgery  - Ask your doctor to send all of your results to the surgery center/hospital before surgery. Your doctor also may ask you to bring the results with you on the day of surgery.  - Tell your doctor if:  - You are allergic to latex or rubber (latex and rubber gloves are often used in medical care).  - You are taking any medicines (including aspirin), vitamins, or herbal products. You may need to stop taking some medicines before surgery.  - You have any medical problems (allergies, diabetes, or heart disease, for example).  - You have a pacemaker or an AICD (automatic implanted cardiac defibrillator). If you do,  please bring the ID card with you on the day of surgery.  - You are a smoker. People who smoke have a higher risk of infection after surgery. Ask your doctor how you can quit smoking.  - If you Primary Doctor is not within the [facility] system, you will need to have your pre-op physical faxed to us to be scanned into your chart.  - [place]: [phone] or [phone]  - [facility] [phone]  - [facility] ([facility] [place]): [phone]  ? Call your insurance company. Ask if you need pre-approval for your surgery. If you do not have insurance, please let us know. If you wish to speak to the , please alert the clinic staff so this can be arranged.  ? Arrange for someone to drive you home after surgery.  will need to be a responsible adult (18 years or older) that will provide transportation to and from surgery and stay in the waiting room during your surgery. You may not drive yourself or take public transportation to and from surgery.  ? Arrange for someone to stay with you for 24 hours after you go home. This person must be a responsible adult (18 years or older).  ? Call your surgeon or their nurse if there is any change in your health (cold, flu, infections, hospitalizations).  ? Do not smoke, drink alcohol, or take over-the-counter medicine for 24 hours before and after surgery.  ? If you take prescribed drugs, you may need to stop them until after the surgery.  Discuss what medications to take or not take prior to surgery with your Primary Doctor at your pre-op physical. Avoid over-the-counter blood-thinning medications such as Aspirin, Ibuprofen, vitamin E, or fish oil 7 days prior to surgery (unless otherwise directed by your Primary Doctor). Tylenol is a good alternative for mild pain relief prior to surgery.  ? Eating and drinking guidelines prior to surgery:  - Stop all solid food consumption 8 hours prior to surgery  - You may drink clear liquids up to 2  hours prior to surgery (water, fruit juices without pulp, jello, tea/coffee without creamer, sports drinks, clear-fat free broth (bouillon or consomme), popsicles (without milk, bits of fruit, or seeds/nuts)  ? Follow instructions given for showering or bathing before surgery.    - Use 8 ounces of antiseptic surgical soap, like:  - Hibiclens, Scrub Care, or Exidine  - You can find it at your local pharmacy, clinic, or retail store. If you have trouble, ask your pharmacist to help you find the right substitute.  - Please wash with one of the above soaps twice before coming to the hospital for your surgery. This will decrease bacteria (germs) on your skin. It will also help reduce your chance of infection after surgery.  - Items you will need for showering:  - 4 newly washed washcloths  - 2 newly washed towels  - 8 ounces of one of the above soaps  - Following these instructions:  - The evening before surgery: Shower or bathe as you normally would, using your regular soap and a clean washcloth. Give special attention to places where your incision (surgical cut) or catheters will be. This includes your groin area. Rinse well. You may wash your hair with your regular shampoo. Next, wash your body with 4 ounces of the antiseptic soap. Use a clean, damp washcloth and gently clean your body (from the chin down). If your surgery involves your head, use the special soap on your head and scalp. Rinse well and dry off using a newly washed towel.  - The morning of surgery: Repeat the same process as the evening shower.  - Other suggestions:    Do not shave within 12 inches of your incision (surgical cut) area for at least 3 days before surgery. Shaving can make small cuts in the skin. This puts you at higher risk of infection.    Wear freshly washed pajamas or clothing after your evening shower.    Wear freshly washed clothes the day of surgery.    Wash and change your bed sheets the day before surgery to have clean bed sheets  after your shower and when you get home from surgery.    If you have trouble washing all areas, make sure someone helps you.    Don't use any deodorant, lotion or powder after your shower.    Women who are menstruating should wear a fresh sanitary pad to the hospital.  ? Do not wear or add deodorant, cologne, lotion, makeup, nail polish or jewelry to surgery. If you wear fake nails, please remove at least one nail before coming to surgery (an oxygen monitor needs to be placed on your finger during surgery).  ? Bring these items to the surgery center/hospital:  - Insurance card  - Money for parking and co-pays, if needed  - A list of all the medicines you take. Include vitamins, minerals, herbs, and over-the-counter drugs.  Note any drug allergies.  - A copy of your advance health care directive, if you have one. This tells us what treatment you would want--and who would make health care decisions--if you could no longer speak for yourself. You may request this form in advance or download it from [email or web address].  - A case for glasses, contact lenses, hearing aids, or dentures.  - Your inhaler or CPAP machine, if you use these at home.  ? Leave extra cash, jewelry, and other valuables at home.  When you arrive  When you get to the surgery center/hospital, you will:  ? Check in. If you are under age 18, you must be with a parent or legal guardian.  ? Sign consent forms, if you haven t already. These forms state that you know the risks and benefits of surgery. When you sign the forms, you give us permission to do the surgery. Do not sign them unless you understand what will happen during and after your surgery. If you have any questions about your surgery, ask to speak with your doctor before you sign the forms. If you don t understand the answers, ask again.  ? Receive a copy of the Patient s Bill of Rights. If you do not receive a copy, please ask for one.  ? Change into hospital clothes. Your belongings  will be placed in a bag. We will return them to you after surgery.  ? Meet with the anesthesia provider. He or she will tell you what kind of anesthesia (medicine) will be used to keep you comfortable during surgery.  Remember: it s okay to remind doctors and nurses to wash their hands before touching you.  In most cases, your surgeon will use a marker to write his or her initials on the surgery site. This ensures that the exact site is operated on.  For safety reasons, we will ask you the same questions many times. For example, we may ask your name and birth date over and over again.  Friends and family can stay with you until it s time for surgery. While you re in surgery, they will be in the waiting area. Please note that cell phones are not allowed in some patient care areas.  If you have questions about what will happen in the operating room, talk to your care team.  After surgery  We will move you to a recovery room, where we will watch you closely. If you have any pain or discomfort, tell your nurse. He or she will try to make you comfortable.  If you are staying overnight, we will move you to your hospital room after you are awake.  If you are going home, we will move you to another room. Friends and family may be able to join you. The length of time you spend in recovery depend on the type of medicine you received, your medical condition, the type of surgery you had, or your response to the anesthesia given during your procedure.  When you are discharged from the recovery room, the nurses will review instructions with you and your caregiver.  ? Please wash your hands every time you touch the wound or change bandages or dressings.  ? Do not submerge the wound in water.  You may not use a bathtub or hot tub until the wound is closed. The wait time frame is generally 2-3 weeks, but any open area can be a source of incoming bacteria, so it is better to be on the safe side and avoid water submersion until your  wound is fully healed.  ? You may take a shower 24 hours after surgery. Double check with your surgeon if it is OK for water to run over the wound, whether it has been sutured, stapled, glued, or is open. You may gently wash the wound using the antiseptic soap provided for your pre-surgery showering (do not use a washcloth). Any mild soap will work as well.  ? Many surgical wounds will have small white strips of tape on them called steri-strips.  Do not remove these. The edges will curl and fall off within 7-10 days with normal showering.  ? If you are going home with sutures (stitches) or staples, you must return to the clinic to have them taken out, usually within 1-2 weeks. Some stitches are dissolvable and do not require removal. Make sure to clarify with your surgeon or surgery nurse reviewing discharge paperwork what kind of sutures you have.  ? Signs and symptoms of infection include:  - Fever, temperature over 101.5   F  - Redness  - Swelling  - Increased pain  - Green or yellow drainage which may or may not have a foul odor  Dealing with pain  A nurse will check your comfort level often during your stay. He or she will work with you to manage your pain.  Remember:  ? All pain is real. There are many ways to control pain. We can help you decide what works best for you.  ? Ask for pain medicine when you need it. Don t try to  tough it out --this can make you feel worse. Always take your medicine as ordered.  ? Medicine doesn t work the same for everyone. If your medicine isn t working, tell your nurse. There may be other medicines or treatments we can try.  Going home  We will let you know when you re ready to leave the surgery center or hospital. Before you leave, we will tell you how to care for yourself at home and prevent infections. If you do not understand something, please say so. We will answer any questions you have. We will then help you get ready to leave.  Remember, you must have a responsible  adult (18 years or older) to stay with you 24 hours after you leave the hospital.  Take it easy when you get home. You will need some time to recover--you may be more tired than you realize at first. Rest and relax for at least the first 24 hours at home. You ll feel better and heal faster if you take good care of yourself.  Follow the discharge instructions that are given to you when you leave the surgery center or hospital  Please call the clinic if you experience any problems during regular clinic hours (Monday-Friday 8:00am-5:00pm).  If you experience problems during non-clinic hours, please call the [facility] on-call line at [phone] and ask the  to page the on-call Provider for your specialty. The on-call Provider will call you back and can triage your symptoms and further advise. If you are having an emergency, always call 911 or seek immediate evaluation at the Emergency Room.  Locations  [facility]  Same-day surgery center - 2nd floor, check-in #5  [address] [place] [address]  [phone]  www.[facility].[place].[facility] ([facility])  [address] [place] [address]  [phone]   [email or web address]    [address] [address]  [phone] (patient registration)  [phone] (main line)  www.[email or web address]  [facility]  [address] [place] [address]  [facility], 3rd floor for check-in  [phone] (patient registration)  [phone] (main line)  www.[email or web address]  [facility]  [address] [address]  [phone]  www.[facility].[place].[facility]  [address]  [address]  [phone]  [email or web address]  [facility]  [address] [place] [address]  [phone]  www.[facility].[place].[facility]  [address] [place] [address]  [phone]  www.[facility].[place].[facility] Alethea Cardenas  [address] [place] [address]  [phone]  www.[place].[facility]  [address] [place], [place] [address] [phone]  [email or web address]              Follow-ups after your visit        Who to contact     If you have questions or need follow up information about today's clinic visit or your schedule please contact [facility] directly at [phone].  Normal or non-critical lab and imaging results will be communicated to you by [employer], letter or phone within 4 business days after the clinic has received the results. If you do not hear from us within 7 days, please contact the clinic through [employer] or phone. If you have a critical or abnormal lab result, we will notify you by phone as soon as possible.  Submit refill requests through [employer] or call your pharmacy and they will forward the refill request to us. Please allow 3 business days for your refill to be completed.          Additional Information About Your Visit        [employer] [employer] Information     [employer] gives you secure access to your electronic health record. If you see a primary care provider, you can also send messages to your care team and make appointments. If you have questions, please call your primary care clinic.  If you do not have a primary care provider, please call [phone] and they will assist you.      [employer] is an electronic gateway that provides easy, online access to your medical records. With [employer], you can request a clinic appointment, read your test results, renew a prescription or communicate with your care  team.     To access your existing account, please contact your [facility] or call [phone] for assistance.        Care EveryWhere ID     This is your Care EveryWhere ID. This could be used by other organizations to access your [facility] medical records  [id number]        Your Vitals Were     Pulse                   65            Blood Pressure from Last 3 Encounters:   05/22/17 (!) 155/94   05/12/17 142/82   04/27/17 133/85    Weight from Last 3 Encounters:   05/12/17 76.7 kg (169 lb)   04/27/17 76.2 kg (168 lb)   03/20/17 74.4 kg (164 lb)              Today, you had the following     No orders found for display       Primary Care Provider Office Phone # Fax #    Juan Parker DO [place] [phone]       [facility] [place] [address]  [place] [address]        Thank you!     Thank you for choosing [facility]  for your care. Our goal is always to provide you with excellent care. Hearing back from our patients is one way we can continue to improve our services. Please take a few minutes to complete the written survey that you may receive in the mail after your visit with us. Thank you!             Your Updated Medication List - Protect others around you: Learn how to safely use, store and throw away your medicines at www.disposemymeds.org.          This list is accurate as of: 5/22/17  2:18 PM.  Always use your most recent med list.                   Brand Name Dispense Instructions for use    aspirin 81 MG tablet      Take  by mouth daily.       calcium 600/vitamin D 600-400 MG-UNIT per tablet   Generic drug:  calcium-vitamin D      Take 1 tablet by mouth daily.       CoQ10 100 MG Caps      Take  by mouth daily.       fish oil-omega-3 fatty acids 1000 MG capsule      Take 1 capsule by mouth daily.       Garlic 1000 MG Caps      Take  by mouth daily.       GLUCOSAMINE CHONDR COMPLEX PO      Take  by mouth daily.       iron 325 (65 FE)  MG tablet      Take 1 tablet by mouth daily.       lisinopril 30 MG tablet    PRINIVIL,ZESTRIL    90 tablet    TAKE 1 TABLET DAILY BY MOUT H       MAGNESIUM OXIDE PO      Take 400 mg by mouth       MULTI-B COMPLEX PO      Take  by mouth daily.       MULTIPLE VITAMIN PO      Take  by mouth daily.       multivitamin Tabs tablet      Take 1 tablet by mouth daily       UNABLE TO FIND      MEDICATION NAME: lipoflavanoid for sinus

## 2017-05-31 LAB — COPATH REPORT: NORMAL

## 2017-06-01 ENCOUNTER — OFFICE VISIT (OUTPATIENT)
Dept: PEDIATRICS | Facility: OTHER | Age: 67
End: 2017-06-01
Attending: INTERNAL MEDICINE
Payer: COMMERCIAL

## 2017-06-01 VITALS
HEART RATE: 73 BPM | RESPIRATION RATE: 20 BRPM | SYSTOLIC BLOOD PRESSURE: 150 MMHG | OXYGEN SATURATION: 98 % | TEMPERATURE: 98.1 F | WEIGHT: 168.2 LBS | BODY MASS INDEX: 23.47 KG/M2 | DIASTOLIC BLOOD PRESSURE: 76 MMHG

## 2017-06-01 DIAGNOSIS — K63.89 MESENTERIC MASS: ICD-10-CM

## 2017-06-01 DIAGNOSIS — Z01.818 PREOP GENERAL PHYSICAL EXAM: Primary | ICD-10-CM

## 2017-06-01 DIAGNOSIS — C91.10 CLL (CHRONIC LYMPHOCYTIC LEUKEMIA) (H): ICD-10-CM

## 2017-06-01 DIAGNOSIS — I10 BENIGN ESSENTIAL HYPERTENSION: ICD-10-CM

## 2017-06-01 LAB
ALBUMIN SERPL-MCNC: 4.3 G/DL (ref 3.4–5)
ALP SERPL-CCNC: 61 U/L (ref 40–150)
ALT SERPL W P-5'-P-CCNC: 35 U/L (ref 0–70)
ANION GAP SERPL CALCULATED.3IONS-SCNC: 6 MMOL/L (ref 3–14)
AST SERPL W P-5'-P-CCNC: 28 U/L (ref 0–45)
BILIRUB SERPL-MCNC: 0.7 MG/DL (ref 0.2–1.3)
BUN SERPL-MCNC: 18 MG/DL (ref 7–30)
CALCIUM SERPL-MCNC: 8.7 MG/DL (ref 8.5–10.1)
CHLORIDE SERPL-SCNC: 103 MMOL/L (ref 94–109)
CO2 SERPL-SCNC: 30 MMOL/L (ref 20–32)
CREAT SERPL-MCNC: 0.96 MG/DL (ref 0.66–1.25)
GFR SERPL CREATININE-BSD FRML MDRD: 78 ML/MIN/1.7M2
GLUCOSE SERPL-MCNC: 100 MG/DL (ref 70–99)
POTASSIUM SERPL-SCNC: 4.7 MMOL/L (ref 3.4–5.3)
PROT SERPL-MCNC: 7.2 G/DL (ref 6.8–8.8)
SODIUM SERPL-SCNC: 139 MMOL/L (ref 133–144)

## 2017-06-01 PROCEDURE — 93010 ELECTROCARDIOGRAM REPORT: CPT | Performed by: INTERNAL MEDICINE

## 2017-06-01 PROCEDURE — 80053 COMPREHEN METABOLIC PANEL: CPT | Mod: ZL | Performed by: INTERNAL MEDICINE

## 2017-06-01 PROCEDURE — 99213 OFFICE O/P EST LOW 20 MIN: CPT

## 2017-06-01 PROCEDURE — 93005 ELECTROCARDIOGRAM TRACING: CPT

## 2017-06-01 PROCEDURE — 99214 OFFICE O/P EST MOD 30 MIN: CPT | Mod: 25 | Performed by: INTERNAL MEDICINE

## 2017-06-01 PROCEDURE — 36415 COLL VENOUS BLD VENIPUNCTURE: CPT | Mod: ZL | Performed by: INTERNAL MEDICINE

## 2017-06-01 ASSESSMENT — PAIN SCALES - GENERAL: PAINLEVEL: NO PAIN (0)

## 2017-06-01 NOTE — MR AVS SNAPSHOT
After Visit Summary   6/1/2017    Ulysses Purcell    MRN: [id number]           Patient Information     Date Of Birth          1950        Visit Information        Provider Department      6/1/2017 11:00 AM Juan Kenney DO [facility] [place]        Today's Diagnoses     Preop general physical exam    -  1    Mesenteric mass        CLL (chronic lymphocytic leukemia) (H)        Benign essential hypertension          Care Instructions      Before Your Surgery      Call your surgeon if there is any change in your health. This includes signs of a cold or flu (such as a sore throat, runny nose, cough, rash or fever).    Do not smoke, drink alcohol or take over the counter medicine (unless your surgeon or primary care doctor tells you to) for the 24 hours before and after surgery.    If you take prescribed drugs: Follow your doctor s orders about which medicines to take and which to stop until after surgery.    Eating and drinking prior to surgery: follow the instructions from your surgeon    Take a shower or bath the night before surgery. Use the soap your surgeon gave you to gently clean your skin. If you do not have soap from your surgeon, use your regular soap. Do not shave or scrub the surgery site.  Wear clean pajamas and have clean sheets on your bed.           Follow-ups after your visit        Your next 10 appointments already scheduled     Jun 08, 2017   Procedure with Melba Goodrich MD   [facility]    [address]  [place] [address]   [phone]            Rajinder 15, 2017  9:00 AM CDT   (Arrive by 8:40 AM)   SHORT with DO Patricio Vazquez ([facility])    [place] [address]   [phone]              Who to contact     If you have questions or need follow up information about today's clinic visit or your schedule please contact PATRICIO CARDENAS directly at  [phone].  Normal or non-critical lab and imaging results will be communicated to you by TUTORizehart, letter or phone within 4 business days after the clinic has received the results. If you do not hear from us within 7 days, please contact the clinic through MaxMilhast or phone. If you have a critical or abnormal lab result, we will notify you by phone as soon as possible.  Submit refill requests through Cookapp or call your pharmacy and they will forward the refill request to us. Please allow 3 business days for your refill to be completed.          Additional Information About Your Visit        TUTORizehart Information     Cookapp gives you secure access to your electronic health record. If you see a primary care provider, you can also send messages to your care team and make appointments. If you have questions, please call your primary care clinic.  If you do not have a primary care provider, please call [phone] and they will assist you.        Care EveryWhere ID     This is your Care EveryWhere ID. This could be used by other organizations to access your [place] medical records  [id number]        Your Vitals Were     Pulse Temperature Respirations Pulse Oximetry BMI (Body Mass Index)       73 98.1  F (36.7  C) (Tympanic) 20 98% 23.47 kg/m2        Blood Pressure from Last 3 Encounters:   06/01/17 150/76   05/22/17 (!) 155/94   05/12/17 142/82    Weight from Last 3 Encounters:   06/01/17 168 lb 3.2 oz (76.3 kg)   05/12/17 169 lb (76.7 kg)   04/27/17 168 lb (76.2 kg)              We Performed the Following     Comprehensive metabolic panel     EKG 12-lead complete w/read - (Clinic Performed)        Primary Care Provider Office Phone # Fax #    Juan Keith Kenney [phone]       [facility] [place] [address]  [place] [address]        Thank you!     Thank you for choosing [facility]  for your care. Our goal is always to provide you with excellent care. Hearing back  from our patients is one way we can continue to improve our services. Please take a few minutes to complete the written survey that you may receive in the mail after your visit with us. Thank you!             Your Updated Medication List - Protect others around you: Learn how to safely use, store and throw away your medicines at www.disposemymeds.org.          This list is accurate as of: 6/1/17 11:30 AM.  Always use your most recent med list.                   Brand Name Dispense Instructions for use    aspirin 81 MG tablet      Take  by mouth daily.       calcium 600/vitamin D 600-400 MG-UNIT per tablet   Generic drug:  calcium-vitamin D      Take 1 tablet by mouth daily.       CoQ10 100 MG Caps      Take  by mouth daily.       fish oil-omega-3 fatty acids 1000 MG capsule      Take 1 capsule by mouth daily.       Garlic 1000 MG Caps      Take  by mouth daily.       GLUCOSAMINE CHONDR COMPLEX PO      Take  by mouth daily.       iron 325 (65 FE) MG tablet      Take 1 tablet by mouth daily.       lisinopril 30 MG tablet    PRINIVIL,ZESTRIL    90 tablet    TAKE 1 TABLET DAILY BY DUKE H       MAGNESIUM OXIDE PO      Take 400 mg by mouth       MULTI-B COMPLEX PO      Take  by mouth daily.       MULTIPLE VITAMIN PO      Take  by mouth daily.       multivitamin Tabs tablet      Take 1 tablet by mouth daily       UNABLE TO FIND      MEDICATION NAME: lipoflavanoid for sinus

## 2017-06-01 NOTE — NURSING NOTE
"Chief Complaint   Patient presents with     Pre-Op Exam       Initial /76 (BP Location: Right arm, Patient Position: Chair, Cuff Size: Adult Large)  Pulse 73  Temp 98.1  F (36.7  C) (Tympanic)  Resp 20  Wt 168 lb 3.2 oz (76.3 kg)  SpO2 98%  BMI 23.47 kg/m2 Estimated body mass index is 23.47 kg/(m^2) as calculated from the following:    Height as of 5/12/17: 5' 10.98\" (1.803 m).    Weight as of this encounter: 168 lb 3.2 oz (76.3 kg).  Medication Reconciliation: complete   Danni Stone LPN      "

## 2017-06-01 NOTE — PROGRESS NOTES
[facility] [place]  [address]  [place] [address]  [phone]  Dept: [phone]    PRE-OP EVALUATION:  Today's date: 2017    Ulysses Purcell (: 1950) presents for pre-operative evaluation assessment as requested by Dr. Goodrich.  He requires evaluation and anesthesia risk assessment prior to undergoing surgery/procedure for treatment of Abdominal mass biopsy noted on CT scan for CLL monitoring .  Proposed procedure: biopsy    Date of Surgery/ Procedure: 17  Time of Surgery/ Procedure: 10:10  [facility]   Fax number for surgical facility: [phone]  Primary Physician: Juan Kenney  Type of Anesthesia Anticipated: to be determined    Patient has a Health Care Directive or Living Will:  YES Living will on file    1. NO - Do you have a history of heart attack, stroke, stent, bypass or surgery on an artery in the head, neck, heart or legs?  2. NO - Do you ever have any pain or discomfort in your chest?  3. NO - Do you have a history of  Heart Failure?  4. NO - Are you troubled by shortness of breath when: walking on the level, up a slight hill or at night?  5. NO - Do you currently have a cold, bronchitis or other respiratory infection?  6. NO - Do you have a cough, shortness of breath or wheezing?  7. NO - Do you sometimes get pains in the calves of your legs when you walk?  8. NO - Do you or anyone in your family have previous history of blood clots?  9. NO - Do you or does anyone in your family have a serious bleeding problem such as prolonged bleeding following surgeries or cuts?  10. NO - Have you ever had problems with anemia or been told to take iron pills?  11. NO - Have you had any abnormal blood loss such as black, tarry or bloody stools, or abnormal vaginal bleeding?  12. NO - Have you ever had a blood transfusion?  13. NO - Have you or any of your relatives ever had problems with anesthesia?  14. NO - Do you have sleep apnea, excessive  snoring or daytime drowsiness?  15. NO - Do you have any prosthetic heart valves?  16. NO - Do you have prosthetic joints?  17. NO - Is there any chance that you may be pregnant?      HPI:                                                      Brief HPI related to upcoming procedure: Patient is a 67 yo mal with HTN and CLL who had a noted mass on the abdominal mesentery on routine CT scanning for CLL follow up  which needs a biopsy for etiology of the mass.      HYPERTENSION - Patient has longstanding history of mod-severe HTN , currently denies any symptoms referable to elevated blood pressure. Specifically denies chest pain, palpitations, dyspnea, orthopnea, PND or peripheral edema. Blood pressure readings have been in normal range. Current medication regimen is as listed below. Patient denies any side effects of medication.                                                                                                                                                                                          .    MEDICAL HISTORY:                                                      Patient Active Problem List    Diagnosis Date Noted     Viral gastroenteritis 09/16/2016     Priority: Medium     Diarrhea 09/13/2016     Priority: Medium     Routine general medical examination at a health care facility 07/15/2015     Priority: Medium     ACP (advance care planning) 09/13/2016     Advance Care Planning 9/13/2016: ACP Review of Chart / Resources Provided:  Reviewed chart for advance care plan.  Ulysses Purcell has been provided information and resources to begin or update their advance care plan.  Added by Iwona Waters             CLL (chronic lymphocytic leukemia) (H) 10/28/2013     Hypertension 07/09/2013     Abdominal bruit 07/09/2013      Past Medical History:   Diagnosis Date     Chronic lymphocytic leukemia (CLL), T-cell (H)     white counts in the 34,000 to 40,000      Elevated blood pressure reading without  diagnosis of hypertension 10/16/2002     Fracture of metatarsal bone(s), closed 10/29/2001    right 5th metatarsal bone     Hypertension      Other orthopedic aftercare(V54.89) 11/08/2001     Routine [facility] medical examination at [facility] 12/01/2000     Special screening for malignant neoplasms, colon 02/04/2004     Past Surgical History:   Procedure Laterality Date     BACK SURGERY  1997    L4-L5 partial laminectomy     COLONOSCOPY  02/04/2004    No polyps     COLONOSCOPY  8/4/2014    Procedure: COLONOSCOPY;  Surgeon: Meliza Morales MD;  Location: [facility]     [facility] LEFT HEART CATHETERIZATION  05/10/2005    Normal angiogram     LAMINECTOMY CERIVCAL POSTERIOR ONE LEVEL  1997     Current Outpatient Prescriptions   Medication Sig Dispense Refill     UNABLE TO FIND MEDICATION NAME: lipoflavanoid for sinus       lisinopril (PRINIVIL,ZESTRIL) 30 MG tablet TAKE 1 TABLET DAILY BY MOUT H 90 tablet 2     multivitamin (OCUVITE) TABS Take 1 tablet by mouth daily       MAGNESIUM OXIDE PO Take 400 mg by mouth       aspirin 81 MG tablet Take  by mouth daily.       MULTIPLE VITAMIN PO Take  by mouth daily.       calcium-vitamin D (CALCIUM 600/VITAMIN D) 600-400 MG-UNIT per tablet Take 1 tablet by mouth daily.       fish oil-omega-3 fatty acids (FISH OIL) 1000 MG capsule Take 1 capsule by mouth daily.        Garlic 1000 MG CAPS Take  by mouth daily.       Coenzyme Q10 (COQ10) 100 MG CAPS Take  by mouth daily.       Glucosamine-Chondroitin (GLUCOSAMINE CHONDR COMPLEX PO) Take  by mouth daily.       Ferrous Sulfate (IRON) 325 (65 FE) MG tablet Take 1 tablet by mouth daily.       B Complex-Biotin-FA (MULTI-B COMPLEX PO) Take  by mouth daily.       OTC products: None, except as noted above    No Known Allergies   Latex Allergy: NO    Social History   Substance Use Topics     Smoking status: Never Smoker     Smokeless tobacco: Never Used     Alcohol use Yes      Comment: red wine nightly     History   Drug Use No       REVIEW OF  SYSTEMS:                                                    C: NEGATIVE for fever, chills, change in weight  E: NEGATIVE for vision changes or irritation  E/M: NEGATIVE for ear, mouth and throat problems  R: NEGATIVE for significant cough or SOB  B: NEGATIVE for masses, tenderness or discharge  CV: NEGATIVE for chest pain, palpitations or peripheral edema  GI: NEGATIVE for nausea, abdominal pain, heartburn, or change in bowel habits  : NEGATIVE for frequency, dysuria, or hematuria  M: NEGATIVE for significant arthralgias or myalgia  N: NEGATIVE for weakness, dizziness or paresthesias  E: NEGATIVE for temperature intolerance, skin/hair changes  H: NEGATIVE for bleeding problems  P: NEGATIVE for changes in mood or affect    EXAM:                                                    /76 (BP Location: Right arm, Patient Position: Chair, Cuff Size: Adult Large)  Pulse 73  Temp 98.1  F (36.7  C) (Tympanic)  Resp 20  Wt 168 lb 3.2 oz (76.3 kg)  SpO2 98%  BMI 23.47 kg/m2    GENERAL APPEARANCE: healthy, alert and no distress     EYES: EOMI,  PERRL     HENT: ear canals and TM's normal and nose and mouth without ulcers or lesions     NECK: no adenopathy, no asymmetry, masses, or scars and thyroid normal to palpation     NECK: no bruits     RESP: lungs clear to auscultation - no rales, rhonchi or wheezes     CV: regular rates and rhythm, normal S1 S2, no S3 or S4 and no murmur, click or rub     ABDOMEN:  soft, nontender, no HSM or masses and bowel sounds normal     ABDOMEN: no bruits heard and no palpable masses     MS: extremities normal- no gross deformities noted, no evidence of inflammation in joints, FROM in all extremities.     SKIN: no suspicious lesions or rashes     NEURO: Normal strength and tone, sensory exam grossly normal, mentation intact and speech normal     PSYCH: mentation appears normal. and affect normal/bright        LYMPHATICS: normal ant/post cervical, supraclavicular nodes    DIAGNOSTICS:                                                            EKG Interpretation:      Interpreted by Juan Kenney DO  Time reviewed:1220  Symptoms at time of EKG: None   Rhythm: Normal sinus   Rate: Normal  Axis: Left Axis Deviation  Ectopy: None  Conduction: Normal  ST Segments/ T Waves: No ST-T wave changes and No acute ischemic changes  Q Waves: None  Comparison to prior: No old EKG available    Clinical Impression: normal EKG        Recent Labs   Lab Test  05/12/17   0818  04/27/17   0827  01/19/17   0828   HGB  13.0*  13.5  14.0   PLT  136*  152  138*   NA   --   139  141   POTASSIUM   --   5.2  4.7   CR   --   0.96  1.04      Results for orders placed or performed in visit on 06/01/17   Comprehensive metabolic panel   Result Value Ref Range    Sodium 139 133 - 144 mmol/L    Potassium 4.7 3.4 - 5.3 mmol/L    Chloride 103 94 - 109 mmol/L    Carbon Dioxide 30 20 - 32 mmol/L    Anion Gap 6 3 - 14 mmol/L    Glucose 100 (H) 70 - 99 mg/dL    Urea Nitrogen 18 7 - 30 mg/dL    Creatinine 0.96 0.66 - 1.25 mg/dL    GFR Estimate 78 >60 mL/min/1.7m2    GFR Estimate If Black >90   GFR Calc   >60 mL/min/1.7m2    Calcium 8.7 8.5 - 10.1 mg/dL    Bilirubin Total 0.7 0.2 - 1.3 mg/dL    Albumin 4.3 3.4 - 5.0 g/dL    Protein Total 7.2 6.8 - 8.8 g/dL    Alkaline Phosphatase 61 40 - 150 U/L    ALT 35 0 - 70 U/L    AST 28 0 - 45 U/L         IMPRESSION:                                                    Reason for surgery/procedure: Patient has CLL and an abdominal mass on the mesentary which need a biopsy for identification of the mass and rule out cancer.  Diagnosis/reason for consult: Patient needs an assessment for her risk of cardiopulmonary complications under general anesthesia for the proposed procedure of abdominal mass biopsy to be done on 06/08/2017 by Dr. Goodrich    The proposed surgical procedure is considered INTERMEDIATE risk.    REVISED CARDIAC RISK INDEX  The patient has the following serious  cardiovascular risks for perioperative complications such as (MI, PE, VFib and 3  AV Block):  No serious cardiac risks  INTERPRETATION: 0 risks: Class I (very low risk - 0.4% complication rate)    The patient has the following additional risks for perioperative complications:  No identified additional risks      ICD-10-CM    1. Preop general physical exam Z01.818    2. Mesenteric mass K63.9 Comprehensive metabolic panel   3. CLL (chronic lymphocytic leukemia) (H) C91.10 Comprehensive metabolic panel   4. Benign essential hypertension I10 EKG 12-lead complete w/read - (Clinic Performed)       RECOMMENDATIONS:                                                        Cardiovascular Risk  Performs 4 METs exercise without symptoms (Light housework (dusting, washing dishes), Climb a flight of stairs and Walk on level ground at 15 minutes per mile (4 miles/hour)) .       --Patient is to take all scheduled medications on the day of surgery EXCEPT for modifications listed below.    Anticoagulant or Antiplatelet Medication Use  ASPIRIN: Discontinue ASA 7-10 days prior to procedure to reduce bleeding risk.  It should be resumed post-operatively.        ACE Inhibitor or Angiotensin Receptor Blocker (ARB) Use  Ace inhibitor or Angiotensin Receptor Blocker (ARB) and should HOLD this medication for the 24 hours prior to surgery.      APPROVAL GIVEN to proceed with proposed procedure, without further diagnostic evaluation       Signed Electronically by: Juan Kenney DO, DO    Copy of this evaluation report is provided to requesting physician.    [facility] Preop Guidelines

## 2017-06-02 ENCOUNTER — TELEPHONE (OUTPATIENT)
Dept: ONCOLOGY | Facility: OTHER | Age: 67
End: 2017-06-02

## 2017-06-08 ENCOUNTER — HOSPITAL ENCOUNTER (INPATIENT)
Facility: CLINIC | Age: 67
LOS: 1 days | Discharge: HOME | DRG: 825 | End: 2017-06-09
Attending: SURGERY | Admitting: SURGERY
Payer: MEDICARE

## 2017-06-08 ENCOUNTER — SURGERY (OUTPATIENT)
Age: 67
End: 2017-06-08

## 2017-06-08 ENCOUNTER — ANESTHESIA (OUTPATIENT)
Dept: SURGERY | Facility: CLINIC | Age: 67
DRG: 825 | End: 2017-06-08
Payer: MEDICARE

## 2017-06-08 ENCOUNTER — ANESTHESIA EVENT (OUTPATIENT)
Dept: SURGERY | Facility: CLINIC | Age: 67
DRG: 825 | End: 2017-06-08
Payer: MEDICARE

## 2017-06-08 DIAGNOSIS — G89.18 ACUTE POST-OPERATIVE PAIN: Primary | ICD-10-CM

## 2017-06-08 DIAGNOSIS — K59.03 DRUG INDUCED CONSTIPATION: ICD-10-CM

## 2017-06-08 LAB
ABO + RH BLD: NORMAL
ABO + RH BLD: NORMAL
BLD GP AB SCN SERPL QL: NORMAL
BLOOD BANK CMNT PATIENT-IMP: NORMAL
CREAT SERPL-MCNC: 0.98 MG/DL (ref 0.66–1.25)
ERYTHROCYTE [DISTWIDTH] IN BLOOD BY AUTOMATED COUNT: 14.5 % (ref 10–15)
GFR SERPL CREATININE-BSD FRML MDRD: 76 ML/MIN/1.7M2
GLUCOSE SERPL-MCNC: 97 MG/DL (ref 70–99)
HCT VFR BLD AUTO: 42.1 % (ref 40–53)
HGB BLD-MCNC: 14.2 G/DL (ref 13.3–17.7)
MCH RBC QN AUTO: 30.7 PG (ref 26.5–33)
MCHC RBC AUTO-ENTMCNC: 33.7 G/DL (ref 31.5–36.5)
MCV RBC AUTO: 91 FL (ref 78–100)
PLATELET # BLD AUTO: 118 10E9/L (ref 150–450)
POTASSIUM SERPL-SCNC: 4.2 MMOL/L (ref 3.4–5.3)
RBC # BLD AUTO: 4.62 10E12/L (ref 4.4–5.9)
SPECIMEN EXP DATE BLD: NORMAL
WBC # BLD AUTO: 56.1 10E9/L (ref 4–11)

## 2017-06-08 PROCEDURE — 07BB0ZX EXCISION OF MESENTERIC LYMPHATIC, OPEN APPROACH, DIAGNOSTIC: ICD-10-PCS | Performed by: SURGERY

## 2017-06-08 PROCEDURE — 86900 BLOOD TYPING SEROLOGIC ABO: CPT | Performed by: SURGERY

## 2017-06-08 PROCEDURE — 25000565 ZZH ISOFLURANE, EA 15 MIN: Performed by: SURGERY

## 2017-06-08 PROCEDURE — 36000059 ZZH SURGERY LEVEL 3 EA 15 ADDTL MIN UMMC: Performed by: SURGERY

## 2017-06-08 PROCEDURE — 88341 IMHCHEM/IMCYTCHM EA ADD ANTB: CPT | Performed by: SURGERY

## 2017-06-08 PROCEDURE — 25000128 H RX IP 250 OP 636: Performed by: NURSE ANESTHETIST, CERTIFIED REGISTERED

## 2017-06-08 PROCEDURE — 37000009 ZZH ANESTHESIA TECHNICAL FEE, EACH ADDTL 15 MIN: Performed by: SURGERY

## 2017-06-08 PROCEDURE — 40000170 ZZH STATISTIC PRE-PROCEDURE ASSESSMENT II: Performed by: SURGERY

## 2017-06-08 PROCEDURE — 0WJG4ZZ INSPECTION OF PERITONEAL CAVITY, PERCUTANEOUS ENDOSCOPIC APPROACH: ICD-10-PCS | Performed by: SURGERY

## 2017-06-08 PROCEDURE — 25000125 ZZHC RX 250: Performed by: NURSE ANESTHETIST, CERTIFIED REGISTERED

## 2017-06-08 PROCEDURE — 88342 IMHCHEM/IMCYTCHM 1ST ANTB: CPT | Performed by: SURGERY

## 2017-06-08 PROCEDURE — 00000160 ZZHCL STATISTIC H-SPECIAL HANDLING: Performed by: SURGERY

## 2017-06-08 PROCEDURE — 40001005 ZZHCL STATISTIC FLOW >15 ABY TC 88189: Performed by: SURGERY

## 2017-06-08 PROCEDURE — 36415 COLL VENOUS BLD VENIPUNCTURE: CPT | Performed by: ANESTHESIOLOGY

## 2017-06-08 PROCEDURE — 88264 CHROMOSOME ANALYSIS 20-25: CPT | Performed by: SURGERY

## 2017-06-08 PROCEDURE — 00000159 ZZHCL STATISTIC H-SEND OUTS PREP: Performed by: SURGERY

## 2017-06-08 PROCEDURE — A9270 NON-COVERED ITEM OR SERVICE: HCPCS | Mod: GY | Performed by: SURGERY

## 2017-06-08 PROCEDURE — 88305 TISSUE EXAM BY PATHOLOGIST: CPT | Performed by: SURGERY

## 2017-06-08 PROCEDURE — 82565 ASSAY OF CREATININE: CPT | Performed by: ANESTHESIOLOGY

## 2017-06-08 PROCEDURE — 88280 CHROMOSOME KARYOTYPE STUDY: CPT | Performed by: SURGERY

## 2017-06-08 PROCEDURE — 25000132 ZZH RX MED GY IP 250 OP 250 PS 637: Mod: GY | Performed by: SURGERY

## 2017-06-08 PROCEDURE — 27210794 ZZH OR GENERAL SUPPLY STERILE: Performed by: SURGERY

## 2017-06-08 PROCEDURE — 88239 TISSUE CULTURE TUMOR: CPT | Performed by: SURGERY

## 2017-06-08 PROCEDURE — 88184 FLOWCYTOMETRY/ TC 1 MARKER: CPT | Performed by: SURGERY

## 2017-06-08 PROCEDURE — 88333 PATH CONSLTJ SURG CYTO XM 1: CPT | Performed by: SURGERY

## 2017-06-08 PROCEDURE — 84132 ASSAY OF SERUM POTASSIUM: CPT | Performed by: ANESTHESIOLOGY

## 2017-06-08 PROCEDURE — S0020 INJECTION, BUPIVICAINE HYDRO: HCPCS | Performed by: SURGERY

## 2017-06-08 PROCEDURE — 86901 BLOOD TYPING SEROLOGIC RH(D): CPT | Performed by: SURGERY

## 2017-06-08 PROCEDURE — 86850 RBC ANTIBODY SCREEN: CPT | Performed by: SURGERY

## 2017-06-08 PROCEDURE — 82947 ASSAY GLUCOSE BLOOD QUANT: CPT | Performed by: ANESTHESIOLOGY

## 2017-06-08 PROCEDURE — 88185 FLOWCYTOMETRY/TC ADD-ON: CPT | Performed by: SURGERY

## 2017-06-08 PROCEDURE — C9399 UNCLASSIFIED DRUGS OR BIOLOG: HCPCS | Performed by: NURSE ANESTHETIST, CERTIFIED REGISTERED

## 2017-06-08 PROCEDURE — 88275 CYTOGENETICS 100-300: CPT | Performed by: SURGERY

## 2017-06-08 PROCEDURE — 88271 CYTOGENETICS DNA PROBE: CPT | Performed by: SURGERY

## 2017-06-08 PROCEDURE — 25000128 H RX IP 250 OP 636: Performed by: SURGERY

## 2017-06-08 PROCEDURE — 36000057 ZZH SURGERY LEVEL 3 1ST 30 MIN - UMMC: Performed by: SURGERY

## 2017-06-08 PROCEDURE — 37000008 ZZH ANESTHESIA TECHNICAL FEE, 1ST 30 MIN: Performed by: SURGERY

## 2017-06-08 PROCEDURE — 25000125 ZZHC RX 250: Performed by: ANESTHESIOLOGY

## 2017-06-08 PROCEDURE — 25000125 ZZHC RX 250: Performed by: SURGERY

## 2017-06-08 PROCEDURE — 85027 COMPLETE CBC AUTOMATED: CPT | Performed by: ANESTHESIOLOGY

## 2017-06-08 PROCEDURE — 71000014 ZZH RECOVERY PHASE 1 LEVEL 2 FIRST HR: Performed by: SURGERY

## 2017-06-08 RX ORDER — SODIUM CHLORIDE, SODIUM LACTATE, POTASSIUM CHLORIDE, CALCIUM CHLORIDE 600; 310; 30; 20 MG/100ML; MG/100ML; MG/100ML; MG/100ML
INJECTION, SOLUTION INTRAVENOUS CONTINUOUS
Status: DISCONTINUED | OUTPATIENT
Start: 2017-06-08 | End: 2017-06-09 | Stop reason: HOSPADM

## 2017-06-08 RX ORDER — SODIUM CHLORIDE, SODIUM LACTATE, POTASSIUM CHLORIDE, CALCIUM CHLORIDE 600; 310; 30; 20 MG/100ML; MG/100ML; MG/100ML; MG/100ML
INJECTION, SOLUTION INTRAVENOUS CONTINUOUS
Status: DISCONTINUED | OUTPATIENT
Start: 2017-06-08 | End: 2017-06-08

## 2017-06-08 RX ORDER — CEFAZOLIN SODIUM 2 G/100ML
2 INJECTION, SOLUTION INTRAVENOUS
Status: COMPLETED | OUTPATIENT
Start: 2017-06-08 | End: 2017-06-08

## 2017-06-08 RX ORDER — NALOXONE HYDROCHLORIDE 0.4 MG/ML
.1-.4 INJECTION, SOLUTION INTRAMUSCULAR; INTRAVENOUS; SUBCUTANEOUS
Status: DISCONTINUED | OUTPATIENT
Start: 2017-06-08 | End: 2017-06-09 | Stop reason: HOSPADM

## 2017-06-08 RX ORDER — HYDRALAZINE HYDROCHLORIDE 20 MG/ML
10 INJECTION INTRAMUSCULAR; INTRAVENOUS EVERY 6 HOURS PRN
Status: DISCONTINUED | OUTPATIENT
Start: 2017-06-08 | End: 2017-06-09 | Stop reason: HOSPADM

## 2017-06-08 RX ORDER — LIDOCAINE 40 MG/G
CREAM TOPICAL
Status: DISCONTINUED | OUTPATIENT
Start: 2017-06-08 | End: 2017-06-08 | Stop reason: HOSPADM

## 2017-06-08 RX ORDER — SODIUM CHLORIDE, SODIUM LACTATE, POTASSIUM CHLORIDE, CALCIUM CHLORIDE 600; 310; 30; 20 MG/100ML; MG/100ML; MG/100ML; MG/100ML
INJECTION, SOLUTION INTRAVENOUS CONTINUOUS PRN
Status: DISCONTINUED | OUTPATIENT
Start: 2017-06-08 | End: 2017-06-08

## 2017-06-08 RX ORDER — FENTANYL CITRATE 50 UG/ML
25-50 INJECTION, SOLUTION INTRAMUSCULAR; INTRAVENOUS
Status: DISCONTINUED | OUTPATIENT
Start: 2017-06-08 | End: 2017-06-08

## 2017-06-08 RX ORDER — CEFAZOLIN SODIUM 1 G/3ML
1 INJECTION, POWDER, FOR SOLUTION INTRAMUSCULAR; INTRAVENOUS SEE ADMIN INSTRUCTIONS
Status: DISCONTINUED | OUTPATIENT
Start: 2017-06-08 | End: 2017-06-08 | Stop reason: HOSPADM

## 2017-06-08 RX ORDER — HYDROMORPHONE HYDROCHLORIDE 1 MG/ML
.3-.5 INJECTION, SOLUTION INTRAMUSCULAR; INTRAVENOUS; SUBCUTANEOUS
Status: DISCONTINUED | OUTPATIENT
Start: 2017-06-08 | End: 2017-06-09 | Stop reason: HOSPADM

## 2017-06-08 RX ORDER — GLYCOPYRROLATE 0.2 MG/ML
INJECTION, SOLUTION INTRAMUSCULAR; INTRAVENOUS PRN
Status: DISCONTINUED | OUTPATIENT
Start: 2017-06-08 | End: 2017-06-08

## 2017-06-08 RX ORDER — NALOXONE HYDROCHLORIDE 0.4 MG/ML
.1-.4 INJECTION, SOLUTION INTRAMUSCULAR; INTRAVENOUS; SUBCUTANEOUS
Status: DISCONTINUED | OUTPATIENT
Start: 2017-06-08 | End: 2017-06-08

## 2017-06-08 RX ORDER — FENTANYL CITRATE 50 UG/ML
INJECTION, SOLUTION INTRAMUSCULAR; INTRAVENOUS PRN
Status: DISCONTINUED | OUTPATIENT
Start: 2017-06-08 | End: 2017-06-08

## 2017-06-08 RX ORDER — OXYCODONE HYDROCHLORIDE 5 MG/1
5 TABLET ORAL
Status: DISCONTINUED | OUTPATIENT
Start: 2017-06-08 | End: 2017-06-09

## 2017-06-08 RX ORDER — ACETAMINOPHEN 325 MG/1
975 TABLET ORAL ONCE
Status: COMPLETED | OUTPATIENT
Start: 2017-06-08 | End: 2017-06-08

## 2017-06-08 RX ORDER — LIDOCAINE HYDROCHLORIDE 20 MG/ML
INJECTION, SOLUTION INFILTRATION; PERINEURAL PRN
Status: DISCONTINUED | OUTPATIENT
Start: 2017-06-08 | End: 2017-06-08

## 2017-06-08 RX ORDER — MEPERIDINE HYDROCHLORIDE 25 MG/ML
12.5 INJECTION INTRAMUSCULAR; INTRAVENOUS; SUBCUTANEOUS
Status: DISCONTINUED | OUTPATIENT
Start: 2017-06-08 | End: 2017-06-08

## 2017-06-08 RX ORDER — SODIUM CHLORIDE, SODIUM LACTATE, POTASSIUM CHLORIDE, CALCIUM CHLORIDE 600; 310; 30; 20 MG/100ML; MG/100ML; MG/100ML; MG/100ML
INJECTION, SOLUTION INTRAVENOUS CONTINUOUS
Status: DISCONTINUED | OUTPATIENT
Start: 2017-06-08 | End: 2017-06-08 | Stop reason: HOSPADM

## 2017-06-08 RX ORDER — DEXAMETHASONE SODIUM PHOSPHATE 4 MG/ML
INJECTION, SOLUTION INTRA-ARTICULAR; INTRALESIONAL; INTRAMUSCULAR; INTRAVENOUS; SOFT TISSUE PRN
Status: DISCONTINUED | OUTPATIENT
Start: 2017-06-08 | End: 2017-06-08

## 2017-06-08 RX ORDER — METOPROLOL TARTRATE 1 MG/ML
5-10 INJECTION, SOLUTION INTRAVENOUS ONCE
Status: COMPLETED | OUTPATIENT
Start: 2017-06-08 | End: 2017-06-08

## 2017-06-08 RX ORDER — ONDANSETRON 2 MG/ML
INJECTION INTRAMUSCULAR; INTRAVENOUS PRN
Status: DISCONTINUED | OUTPATIENT
Start: 2017-06-08 | End: 2017-06-08

## 2017-06-08 RX ORDER — ACETAMINOPHEN 500 MG
1000 TABLET ORAL ONCE
Status: DISCONTINUED | OUTPATIENT
Start: 2017-06-08 | End: 2017-06-08 | Stop reason: HOSPADM

## 2017-06-08 RX ORDER — PROPOFOL 10 MG/ML
INJECTION, EMULSION INTRAVENOUS PRN
Status: DISCONTINUED | OUTPATIENT
Start: 2017-06-08 | End: 2017-06-08

## 2017-06-08 RX ORDER — ONDANSETRON 4 MG/1
4 TABLET, ORALLY DISINTEGRATING ORAL EVERY 30 MIN PRN
Status: DISCONTINUED | OUTPATIENT
Start: 2017-06-08 | End: 2017-06-08

## 2017-06-08 RX ORDER — ONDANSETRON 2 MG/ML
4 INJECTION INTRAMUSCULAR; INTRAVENOUS EVERY 30 MIN PRN
Status: DISCONTINUED | OUTPATIENT
Start: 2017-06-08 | End: 2017-06-08

## 2017-06-08 RX ADMIN — ROCURONIUM BROMIDE 10 MG: 10 INJECTION INTRAVENOUS at 14:36

## 2017-06-08 RX ADMIN — ROCURONIUM BROMIDE 10 MG: 10 INJECTION INTRAVENOUS at 13:50

## 2017-06-08 RX ADMIN — FENTANYL CITRATE 50 MCG: 50 INJECTION, SOLUTION INTRAMUSCULAR; INTRAVENOUS at 13:17

## 2017-06-08 RX ADMIN — HYDROMORPHONE HYDROCHLORIDE 0.5 MG: 1 INJECTION, SOLUTION INTRAMUSCULAR; INTRAVENOUS; SUBCUTANEOUS at 19:54

## 2017-06-08 RX ADMIN — HYDROMORPHONE HYDROCHLORIDE 0.5 MG: 1 INJECTION, SOLUTION INTRAMUSCULAR; INTRAVENOUS; SUBCUTANEOUS at 23:41

## 2017-06-08 RX ADMIN — PHENYLEPHRINE HYDROCHLORIDE 100 MCG: 10 INJECTION, SOLUTION INTRAMUSCULAR; INTRAVENOUS; SUBCUTANEOUS at 14:17

## 2017-06-08 RX ADMIN — DEXAMETHASONE SODIUM PHOSPHATE 8 MG: 4 INJECTION, SOLUTION INTRA-ARTICULAR; INTRALESIONAL; INTRAMUSCULAR; INTRAVENOUS; SOFT TISSUE at 12:56

## 2017-06-08 RX ADMIN — LISINOPRIL 30 MG: 20 TABLET ORAL at 19:56

## 2017-06-08 RX ADMIN — ONDANSETRON 4 MG: 2 INJECTION INTRAMUSCULAR; INTRAVENOUS at 15:20

## 2017-06-08 RX ADMIN — ROCURONIUM BROMIDE 10 MG: 10 INJECTION INTRAVENOUS at 14:57

## 2017-06-08 RX ADMIN — FENTANYL CITRATE 50 MCG: 50 INJECTION, SOLUTION INTRAMUSCULAR; INTRAVENOUS at 12:56

## 2017-06-08 RX ADMIN — ACETAMINOPHEN 975 MG: 325 TABLET, FILM COATED ORAL at 08:51

## 2017-06-08 RX ADMIN — ROCURONIUM BROMIDE 40 MG: 10 INJECTION INTRAVENOUS at 12:33

## 2017-06-08 RX ADMIN — PROPOFOL 180 MG: 10 INJECTION, EMULSION INTRAVENOUS at 12:31

## 2017-06-08 RX ADMIN — LIDOCAINE HYDROCHLORIDE 100 MG: 20 INJECTION, SOLUTION INFILTRATION; PERINEURAL at 12:31

## 2017-06-08 RX ADMIN — FENTANYL CITRATE 25 MCG: 50 INJECTION, SOLUTION INTRAMUSCULAR; INTRAVENOUS at 15:33

## 2017-06-08 RX ADMIN — METOPROLOL TARTRATE 5 MG: 5 INJECTION INTRAVENOUS at 17:49

## 2017-06-08 RX ADMIN — OXYCODONE HYDROCHLORIDE 5 MG: 5 TABLET ORAL at 18:52

## 2017-06-08 RX ADMIN — FENTANYL CITRATE 100 MCG: 50 INJECTION, SOLUTION INTRAMUSCULAR; INTRAVENOUS at 12:27

## 2017-06-08 RX ADMIN — SODIUM CHLORIDE, POTASSIUM CHLORIDE, SODIUM LACTATE AND CALCIUM CHLORIDE: 600; 310; 30; 20 INJECTION, SOLUTION INTRAVENOUS at 12:22

## 2017-06-08 RX ADMIN — SODIUM CHLORIDE, POTASSIUM CHLORIDE, SODIUM LACTATE AND CALCIUM CHLORIDE: 600; 310; 30; 20 INJECTION, SOLUTION INTRAVENOUS at 14:15

## 2017-06-08 RX ADMIN — SUGAMMADEX 200 MG: 100 INJECTION, SOLUTION INTRAVENOUS at 15:45

## 2017-06-08 RX ADMIN — MIDAZOLAM HYDROCHLORIDE 2 MG: 1 INJECTION, SOLUTION INTRAMUSCULAR; INTRAVENOUS at 12:22

## 2017-06-08 RX ADMIN — OXYCODONE HYDROCHLORIDE 5 MG: 5 TABLET ORAL at 22:37

## 2017-06-08 RX ADMIN — HYDROMORPHONE HYDROCHLORIDE 0.5 MG: 1 INJECTION, SOLUTION INTRAMUSCULAR; INTRAVENOUS; SUBCUTANEOUS at 14:41

## 2017-06-08 RX ADMIN — HYDROMORPHONE HYDROCHLORIDE 0.5 MG: 1 INJECTION, SOLUTION INTRAMUSCULAR; INTRAVENOUS; SUBCUTANEOUS at 21:16

## 2017-06-08 RX ADMIN — GLYCOPYRROLATE 0.2 MG: 0.2 INJECTION, SOLUTION INTRAMUSCULAR; INTRAVENOUS at 13:06

## 2017-06-08 RX ADMIN — HYDROMORPHONE HYDROCHLORIDE 0.5 MG: 1 INJECTION, SOLUTION INTRAMUSCULAR; INTRAVENOUS; SUBCUTANEOUS at 13:55

## 2017-06-08 RX ADMIN — ROCURONIUM BROMIDE 10 MG: 10 INJECTION INTRAVENOUS at 13:05

## 2017-06-08 RX ADMIN — METOPROLOL TARTRATE 5 MG: 5 INJECTION INTRAVENOUS at 18:14

## 2017-06-08 RX ADMIN — FENTANYL CITRATE 50 MCG: 50 INJECTION, SOLUTION INTRAMUSCULAR; INTRAVENOUS at 13:49

## 2017-06-08 RX ADMIN — ROCURONIUM BROMIDE 10 MG: 10 INJECTION INTRAVENOUS at 12:51

## 2017-06-08 RX ADMIN — ROCURONIUM BROMIDE 10 MG: 10 INJECTION INTRAVENOUS at 13:28

## 2017-06-08 RX ADMIN — CEFAZOLIN SODIUM 2 G: 2 INJECTION, SOLUTION INTRAVENOUS at 12:45

## 2017-06-08 RX ADMIN — HYDROMORPHONE HYDROCHLORIDE 0.5 MG: 1 INJECTION, SOLUTION INTRAMUSCULAR; INTRAVENOUS; SUBCUTANEOUS at 17:29

## 2017-06-08 RX ADMIN — ROCURONIUM BROMIDE 10 MG: 10 INJECTION INTRAVENOUS at 14:09

## 2017-06-08 RX ADMIN — BUPIVACAINE HYDROCHLORIDE 30 ML: 2.5 INJECTION, SOLUTION INFILTRATION; PERINEURAL at 15:38

## 2017-06-08 RX ADMIN — SODIUM CHLORIDE, POTASSIUM CHLORIDE, SODIUM LACTATE AND CALCIUM CHLORIDE: 600; 310; 30; 20 INJECTION, SOLUTION INTRAVENOUS at 21:15

## 2017-06-08 ASSESSMENT — PAIN DESCRIPTION - DESCRIPTORS
DESCRIPTORS: ACHING;SORE
DESCRIPTORS: SORE;ACHING

## 2017-06-08 NOTE — ANESTHESIA POSTPROCEDURE EVALUATION
Patient: Ulysses Purcell    Procedure(s):  Diagnostic Laparoscopy, Laparatomy Open resection of Mesenteric Lymph Node  - Wound Class: I-Clean    Diagnosis:Chronic Lymphocytic Leukemia  Diagnosis Additional Information: No value filed.    Anesthesia Type:  General, ETT    Note:  Anesthesia Post Evaluation    Patient location during evaluation: PACU  Patient participation: Able to fully participate in evaluation  Level of consciousness: awake and alert  Pain management: satisfactory to patient  Airway patency: patent  Cardiovascular status: blood pressure returned to baseline and hemodynamically stable  Respiratory status: nasal cannula  Hydration status: euvolemic  PONV: controlled     Anesthetic complications: None          Last vitals:  Vitals:    06/08/17 1615 06/08/17 1630 06/08/17 1645   BP: (!) 119/96 148/78 152/83   Pulse:      Resp: 16 16 16   Temp:  36.1  C (97  F)    SpO2: 99% 98% 100%         Electronically Signed By: Dayo Stallworth MD  June 8, 2017  4:52 PM

## 2017-06-08 NOTE — OP NOTE
DATE OF SURGERY: June 8, 2017     SURGEON: Melba Goodrich MD  ASSISTANT:   1. Brian Lambert MD  2. Willy Amaro MD PGY-3    PREOPERATIVE DIAGNOSIS: Mesenteric mass  POSTOPERATIVE DIAGNOSIS: No mesenteric mass found, mesenteric lymphadenopathy    PROCEDURE:   1. Diagnostic laparoscopy  2. Exploratory laparotomy  3. Open resection of mesenteric lymph nodes    ANESTHESIA: General    CLINICAL NOTE:  Ulysses Purcell is a 66 year old man with a 10 cm mesenteric mass and splenomegaly seen on CT in the setting of chronic lymphocytic leukemia.  There is a concern for transformation to lymphoma and an excisional biopsy was needed.  The risks and benefits of laparoscopic possible open mesenteric lymph node biopsy were discussed with the patient and he elected to proceed with informed consent.    OPERATIVE FINDINGS:  1. 10 cm mass seen on May 2017 CT not identified on laparoscopy.  Conversion to laparotomy and intraoperative consultation with Dr Lambert also failed to reveal the mass.  The small bowel mesentery was examined, and multiple 1.5 cm prominent rubbery lymph nodes were found.  Two were resected for diagnosis.    OPERATIVE PROCEDURE:  The patient was brought to the operating room and placed in the supine position with appropriate padding for all of the pressure points. A general anesthetic was administered.   A surgical safety checklist was performed to confirm the patient's identity, the site and laterality of the procedure. Perioperative antibiotics (Ancef) was provided.  VTE prophylaxis was provided with serial compression devices.  A goins catheter was inserted. The abdomen was then prepped and draped in the usual sterile fashion using Chloraprep.     An infraumbilical incision was made.  The linea alba was identified, grasped, elevated and incised.  The abdomen was entered under direct vision.  Two stay sutures were placed. A Minnie trocar was inserted.  The abdomen was insufflated to a pressure of 15 mmHg with  carbon dioxide. A laparoscope was inserted and the abdomen examined.  No injuries relating to the trocar insertion was identified.  There was no ascites. The stomach, liver, colon, small intestine all appeared normal. The spleen was enlarged.  There appeared to be a prominence pushing up the pylorus.  We placed two additional 5 mm trocars in the lateral abdominal wall under direct vision.  Upon reflecting up the colon and omentum, no mesenteric mass was seen.  Looking at the lesser omentum, the prominence was actually seen to be the pancreas.      Because of the difficulty in identifying the mass seen on CT, we elected to remove the Minnie trocar and extend the infraumbilical incision up the midline.  The skin and linea alba were incised.  An exploratory laparotomy was carried out. No mesenteric mass was seen.  We re-reviewed the CT scan and could not correlate any laparotomy findings to that mass.  At this point, I did call my partner, Dr Lambert, in to also take a look.  At first, we placed a gel port at the site of the laparotomy incision to allow re-insufflation of the abdomen.  A laparoscope was re-inserted, and a hand-assisted diagnostic laparoscopy was carried out by Dr Lambert.  He also did not identify the mesenteric mass.  We then allowed the abdomen to desufflate and, using the laparotomy incision, we again reassessed the entire abdomen. No 10 cm mass was found.  We then eviscerated the small intestine and examined the entire small intestine and its mesentery.  There were multiple 1.5 cm rubbery prominent lymph nodes throughout the small bowel mesentery close to the root.  Two of these were removed without injuring the mesenteric vessels and sent to pathology for the lymphoma protocol.  The mesenteric defect was closed with vicryl sutures.  Hemostasis was checked and achieved.  The abdomen was then closed with two running #1 PDS sutures.   The incision was closed in two layers with interrupted 3-0 vicryl  and a running subcuticular 4-0 monocryl.  The two 5 mm port sites were closed with interrupted 4-0 monocryl.  Dermabond was applied.   The patient tolerated the procedure well.  The sponge, needle, instrument counts were correct.  The goins catheter was removed. The patient was then awakened and taken to recovery in stable fashion.      I was present and scrubbed for the entire procedure.    EBL: 5 mL    SPECIMEN(S):  1. Mesenteric lymph node  2. Mesenteric lymph node    POSTOPERATIVE PLANS:  Ulysses Purcell will be admitted to hospital post-operatively for pain management. He will likely be discharged home in the morning.    Melba Goodrich MD MSc [facility]    Division of Surgical Oncology  [facility]

## 2017-06-08 NOTE — PROGRESS NOTES
PACU Rn informed Dr Stallworth that pt BP is 165/101 HR 81.  TORB to give 5-10 mg IV metoprolol in pACU.

## 2017-06-08 NOTE — BRIEF OP NOTE
[facility], [place]    Brief Operative Note    Pre-operative diagnosis: Chronic Lymphocytic Leukemia  Post-operative diagnosis Same as above  Procedure: Procedure(s):  Diagnostic Laparoscopy, Laparotomy, Open excisional biopsy of mesenteric lymph node - Wound Class: I-Clean   - Wound Class: I-Clean  Surgeon: Surgeon(s) and Role:     * Melba Goodrich MD - Primary     * Willy Amaro MD - Resident - Assisting     * Brian Lambert MD - Assisting  Anesthesia: General   Estimated blood loss: 5 mL  Drains: None  Specimens:   ID Type Source Tests Collected by Time Destination   A : Messenteric Lymph Node for frozen Tissue Lymph node, mesentary SURGICAL PATHOLOGY EXAM Melba Goodrich MD 6/8/2017  3:12 PM    B : Messenteric Lymph Node for Lymphoma protocol Tissue Lymph Node SURGICAL PATHOLOGY EXAM Melba Goodrich MD 6/8/2017  3:14 PM      Findings:   Multiple enlarged mesenteric lymph nodes.  Complications: None.  Implants: None.

## 2017-06-08 NOTE — ANESTHESIA PREPROCEDURE EVALUATION
ANESTHESIA PREOP EVALUATION    Procedure: Procedure(s):  Diagnostic Laparoscopy, Laparoscopic Incisional Biopsy Of Mesenteric Lymph Node Possible Open - Wound Class: I-Clean   - Wound Class: I-Clean    HPI: Ulysses Purcell is a 66 year old male presenting for above procedure.     PMHx/PSHx/ROS:  Past Medical History:   Diagnosis Date     Chronic lymphocytic leukemia (CLL), T-cell (H)     white counts in the 34,000 to 40,000      Elevated blood pressure reading without diagnosis of hypertension 10/16/2002     Fracture of metatarsal bone(s), closed 10/29/2001    right 5th metatarsal bone     Hypertension      Other orthopedic aftercare(V54.89) 11/08/2001     Pericarditis     2005     Routine general medical examination at [facility] 12/01/2000     Special screening for malignant neoplasms, colon 02/04/2004       Past Surgical History:   Procedure Laterality Date     BACK SURGERY  1997    L4-L5 partial laminectomy     COLONOSCOPY  02/04/2004    No polyps     COLONOSCOPY  8/4/2014    Procedure: COLONOSCOPY;  Surgeon: Meliza Morales MD;  Location: [facility]     HC LEFT HEART CATHETERIZATION  05/10/2005    Normal angiogram     LAMINECTOMY CERIVCAL POSTERIOR ONE LEVEL  1997         Past Anes Hx: No personal or family h/o anesthesia problems    Soc Hx:   Social History   Substance Use Topics     Smoking status: Never Smoker     Smokeless tobacco: Never Used     Alcohol use Yes      Comment: red wine nightly       Allergies: No Known Allergies    Meds:   Prescriptions Prior to Admission   Medication Sig Dispense Refill Last Dose     UNABLE TO FIND MEDICATION NAME: lipoflavanoid for sinus   Past Month at Unknown time     lisinopril (PRINIVIL,ZESTRIL) 30 MG tablet TAKE 1 TABLET DAILY BY MOUT H 90 tablet 2 6/7/2017 at 2200     multivitamin (OCUVITE) TABS Take 1 tablet by mouth daily   5/31/2017     MAGNESIUM OXIDE PO Take 400 mg by mouth   5/31/2017     aspirin 81 MG tablet Take  by mouth daily.   5/31/2017     MULTIPLE  "VITAMIN PO Take  by mouth daily.   5/31/2017     calcium-vitamin D (CALCIUM 600/VITAMIN D) 600-400 MG-UNIT per tablet Take 1 tablet by mouth daily.   5/31/2017     fish oil-omega-3 fatty acids (FISH OIL) 1000 MG capsule Take 1 capsule by mouth daily.    5/31/2017     Garlic 1000 MG CAPS Take  by mouth daily.   5/31/2017     Coenzyme Q10 (COQ10) 100 MG CAPS Take  by mouth daily.   5/31/2017     Glucosamine-Chondroitin (GLUCOSAMINE CHONDR COMPLEX PO) Take  by mouth daily.   5/31/2017     Ferrous Sulfate (IRON) 325 (65 FE) MG tablet Take 1 tablet by mouth daily.   5/31/2017     B Complex-Biotin-FA (MULTI-B COMPLEX PO) Take  by mouth daily.   5/31/2017       No current outpatient prescriptions on file.       Physical Exam:  Vitals: BP (!) 125/97  Pulse 74  Temp 36.8  C (98.3  F) (Oral)  Resp 16  Ht 1.803 m (5' 11\")  Wt 74.4 kg (164 lb 0.4 oz)  SpO2 97%  BMI 22.88 kg/m2  BMI= Body mass index is 22.88 kg/(m^2).      Labs:  UPT: No results found for: HCGQUANT      BMP:  Recent Labs   Lab Test  06/01/17   1128   NA  139   POTASSIUM  4.7   CHLORIDE  103   CO2  30   BUN  18   CR  0.96   GLC  100*   AXEL  8.7     CBC:   Recent Labs   Lab Test  06/08/17   0844   WBC  56.1*   RBC  4.62   HGB  14.2   HCT  42.1   MCV  91   MCH  30.7   MCHC  33.7   RDW  14.5   PLT  118*     Coags:  No results for input(s): INR, PTT, FIBR in the last 87053 hours.          Anesthesia Evaluation     .             ROS/MED HX    ENT/Pulmonary:  - neg pulmonary ROS     Neurologic:  - neg neurologic ROS     Cardiovascular:  - neg cardiovascular ROS   (+) hypertension----. : . . . :. .       METS/Exercise Tolerance:     Hematologic:  - neg hematologic  ROS       Musculoskeletal: Comment: Hx cerv laminectomy        GI/Hepatic:  - neg GI/hepatic ROS       Renal/Genitourinary:  - ROS Renal section negative       Endo:  - neg endo ROS       Psychiatric:  - neg psychiatric ROS       Infectious Disease:  - neg infectious disease ROS       Malignancy:   (+) "   CLL        Other:                   Recent hematology visit:   Stage II CLL with worsening lymphocytosis and now patient has become symptomatic with weight loss, fatigue and staging studies indicate a new 10 cm microlobulated diamond mass in the upper abdomen, also worsening splenomegaly.  He had some shotty cervical adenopathy, no axillary adenopathy.  We discussed the case with Dr. Koffi Hall, who was covering for Dr. Stubbs of leukemia, lymphoma and he felt that we should proceed with biopsy of this mass to rule out diffuse large B-cell lymphoma as this may be a transformed chronic lymphocytic leukemia.  We are writing therefore to consult with Surgical Oncology at the [facility] with Dr. Brian Lambert for surgical biopsy of this mass.  Also Dr. Hall recommended obtaining TP53 mutation status on the blood.  If the patient has chronic lymphocytic leukemia he may be a candidate for Imbruvica therapy if his TP53 is positive, otherwise we will see the patient after the biopsy obtained and repeat CBC, CMP, LDH.                  Anesthesia Plan      History & Physical Review  History and physical reviewed and following examination; no interval change.    ASA Status:  3 .    NPO Status:  > 8 hours    Plan for General and ETT with Intravenous induction. Maintenance will be Inhalation.    PONV prophylaxis:  Ondansetron  Yovany Hardy        Postoperative Care  Postoperative pain management:  IV analgesics.      Consents  Anesthetic plan, risks, benefits and alternatives discussed with:  Patient..                          .

## 2017-06-08 NOTE — IP AVS SNAPSHOT
MRN:[id number]                      After Visit Summary   6/8/2017    Ulysses Purcell    MRN: [id number]           Thank you!     Thank you for choosing [facility] for your care. Our goal is always to provide you with excellent care. Hearing back from our patients is one way we can continue to improve our services. Please take a few minutes to complete the written survey that you may receive in the mail after you visit with us. Thank you!        Patient Information     Date Of Birth          1950        About your hospital stay     You were admitted on:  June 8, 2017 You last received care in the:  Unit 6D Observation [facility]    You were discharged on:  June 9, 2017       Who to Call     For medical emergencies, please call 911.  For non-urgent questions about your medical care, please call your primary care provider or clinic, [phone]  For questions related to your surgery, please call your surgery clinic        Attending Provider     Provider Melba Torres MD Surgery       Primary Care Provider Office Phone # Fax #    Juan SolorzanoDO alvin [phone]      After Care Instructions     Discharge Instructions       From Dr. Goodrich:    1. Patient to follow up with appointment in 2 weeks. Your pathology report will be reviewed with you in person in clinic.  2. Do not drive while taking narcotic pain medication (oxycodone).   3. May take plain Tylenol as needed for pain in conjunction with oxycodone.  4. Avoid non-steroidal anti-inflammatory medications (Advil, Ibuprofen, Naproxen, aspirin, etc) for 5-7 days.   5. Caution with putting ice on the incision as it will be numb you will not realize it if you leave the ice for too long and can damage your skin.  6. If you develop any abdominal pain, nausea/vomiting, inability to pass gas or stool, fever/chills, worsening pain, redness, swelling, or drainage from your wound please call the clinic (Monday  "through Friday 8:00am-5:00pm [phone] Estephanie MASON) or on-call surgical oncology resident (nights and weekends [phone] and ask \"I would like to page the Surgical Oncology Resident on call.\")   7. No lifting over 15 lbs and no strenuous physical activity for 4-6 weeks.  No bed rest; up and walking around is good for preventing blood clots in the legs.  8. Okay to shower in tomorrow. Please refrain from submerging in a bathtub or swimming pool. Please refrain from applying alcohol or peroxide to the incision.                  Your next 10 appointments already scheduled     Rajinder 15, 2017  9:00 AM CDT   (Arrive by 8:40 AM)   SHORT with Juan Kenney DO   [facility] [place] ([facility] [place] [facility])    [address]  [place] [address]   [phone]              Pending Results     Date and Time Order Name Status Description    6/9/2017 1049 Chromosome malignant tissue In process     6/8/2017 1512 Surgical pathology exam In process             Statement of Approval     Ordered          06/09/17 1646  I have reviewed and agree with all the recommendations and orders detailed in this document.  EFFECTIVE NOW     Approved and electronically signed by:  Willy Amaro MD             Admission Information     Date & Time Provider Department Dept. Phone    6/8/2017 Melba Goodrich MD Unit 6D Observation [facility] [phone]      Your Vitals Were     Blood Pressure Pulse Temperature Respirations Height Weight    159/90 (BP Location: Left arm) 77 97.6  F (36.4  C) (Oral) 16 1.803 m (5' 11\") 74.4 kg (164 lb 0.4 oz)    Pulse Oximetry BMI (Body Mass Index)                96% 22.88 kg/m2          [employer] and [employer] Information     [employer] gives you secure access to your electronic health record. If you see a primary care provider, you can also send messages to your care team and make appointments. If you have questions, please call your primary care clinic.  If you do not have a primary care provider, " please call [phone] and they will assist you.        Care EveryWhere ID     This is your Care EveryWhere ID. This could be used by other organizations to access your [place] medical records  [id number]           Review of your medicines      START taking        Dose / Directions    oxyCODONE 5 MG IR tablet   Commonly known as:  ROXICODONE   Used for:  Acute post-operative pain        Dose:  5 mg   Take 1 tablet (5 mg) by mouth every 4 hours as needed for moderate to severe pain   Quantity:  30 tablet   Refills:  0       senna-docusate 8.6-50 MG per tablet   Commonly known as:  SENOKOT-S;PERICOLACE   Used for:  Drug induced constipation        Dose:  1 tablet   Take 1 tablet by mouth 2 times daily   Quantity:  60 tablet   Refills:  1         CONTINUE these medicines which have NOT CHANGED        Dose / Directions    aspirin 81 MG tablet        Take  by mouth daily.   Refills:  0       calcium 600/vitamin D 600-400 MG-UNIT per tablet   Generic drug:  calcium-vitamin D        Dose:  1 tablet   Take 1 tablet by mouth daily.   Refills:  0       CoQ10 100 MG Caps        Take  by mouth daily.   Refills:  0       fish oil-omega-3 fatty acids 1000 MG capsule        Dose:  1 capsule   Take 1 capsule by mouth daily.   Refills:  0       Garlic 1000 MG Caps        Take  by mouth daily.   Refills:  0       GLUCOSAMINE CHONDR COMPLEX PO        Take  by mouth daily.   Refills:  0       iron 325 (65 FE) MG tablet        Dose:  1 tablet   Take 1 tablet by mouth daily.   Refills:  0       lisinopril 30 MG tablet   Commonly known as:  PRINIVIL,ZESTRIL   Used for:  Essential hypertension        TAKE 1 TABLET DAILY BY MOUT H   Quantity:  90 tablet   Refills:  2       MAGNESIUM OXIDE PO   Used for:  CLL (chronic lymphocytic leukemia) (H)        Dose:  400 mg   Take 400 mg by mouth   Refills:  0       MULTI-B COMPLEX PO        Take  by mouth daily.   Refills:  0       MULTIPLE VITAMIN PO        Take  by mouth daily.   Refills:  0        multivitamin Tabs tablet        Dose:  1 tablet   Take 1 tablet by mouth daily   Refills:  0       UNABLE TO FIND   Used for:  CLL (chronic lymphocytic leukemia) (H), Mesenteric mass        MEDICATION NAME: lipoflavanoid for sinus   Refills:  0            Where to get your medicines      These medications were sent to [facility] - [place], [place] - [address]  [address] [address]     Phone:  [phone]     senna-docusate 8.6-50 MG per tablet         Some of these will need a paper prescription and others can be bought over the counter. Ask your nurse if you have questions.     Bring a paper prescription for each of these medications     oxyCODONE 5 MG IR tablet                Protect others around you: Learn how to safely use, store and throw away your medicines at www.disposemymeds.org.             Medication List: This is a list of all your medications and when to take them. Check marks below indicate your daily home schedule. Keep this list as a reference.      Medications           Morning Afternoon Evening Bedtime As Needed    aspirin 81 MG tablet   Take  by mouth daily.                                calcium 600/vitamin D 600-400 MG-UNIT per tablet   Take 1 tablet by mouth daily.   Generic drug:  calcium-vitamin D                                CoQ10 100 MG Caps   Take  by mouth daily.                                fish oil-omega-3 fatty acids 1000 MG capsule   Take 1 capsule by mouth daily.                                Garlic 1000 MG Caps   Take  by mouth daily.                                GLUCOSAMINE CHONDR COMPLEX PO   Take  by mouth daily.                                iron 325 (65 FE) MG tablet   Take 1 tablet by mouth daily.                                lisinopril 30 MG tablet   Commonly known as:  PRINIVIL,ZESTRIL   TAKE 1 TABLET DAILY BY MOUT H   Last time this was given:  30 mg on 6/8/2017  7:56 PM                                 MAGNESIUM OXIDE PO   Take 400 mg by mouth                                MULTI-B COMPLEX PO   Take  by mouth daily.                                MULTIPLE VITAMIN PO   Take  by mouth daily.                                multivitamin Tabs tablet   Take 1 tablet by mouth daily                                oxyCODONE 5 MG IR tablet   Commonly known as:  ROXICODONE   Take 1 tablet (5 mg) by mouth every 4 hours as needed for moderate to severe pain   Last time this was given:  5 mg on 6/9/2017  3:16 PM                                senna-docusate 8.6-50 MG per tablet   Commonly known as:  SENOKOT-S;PERICOLACE   Take 1 tablet by mouth 2 times daily                                UNABLE TO FIND   MEDICATION NAME: lipoflavanoid for sinus

## 2017-06-08 NOTE — IP AVS SNAPSHOT
Unit 6D Observation [address] [address]    Phone:  [phone]    Fax:  [phone]                                       After Visit Summary   6/8/2017    Ulysses Purcell    MRN: [id number]           After Visit Summary Signature Page     I have received my discharge instructions, and my questions have been answered. I have discussed any challenges I see with this plan with the nurse or doctor.    ..........................................................................................................................................  Patient/Patient Representative Signature      ..........................................................................................................................................  Patient Representative Print Name and Relationship to Patient    ..................................................               ................................................  Date                                            Time    ..........................................................................................................................................  Reviewed by Signature/Title    ...................................................              ..............................................  Date                                                            Time

## 2017-06-08 NOTE — ANESTHESIA CARE TRANSFER NOTE
Patient: Ulysses Purcell    Procedure(s):  Diagnostic Laparoscopy, Laparatomy Open resection of Mesenteric Lymph Node  - Wound Class: I-Clean    Diagnosis: Chronic Lymphocytic Leukemia  Diagnosis Additional Information: No value filed.    Anesthesia Type:   General, ETT     Note:  Airway :Face Mask and Nasal Cannula  Patient transferred to:PACU  Comments: Patient oxygenating and ventilating well on 4LNC.  Patient BONILLA, following commands, and denies pain.  Report given to RN and VSS.       Vitals: (Last set prior to Anesthesia Care Transfer)    CRNA VITALS  6/8/2017 1527 - 6/8/2017 1604      6/8/2017             NIBP: 153/88    NIBP Mean: 122    Ht Rate: 78    SpO2: 99 %    Resp Rate (observed): 16    EKG: Sinus rhythm                Electronically Signed By: ISACC Pena CRNA  June 8, 2017  4:04 PM

## 2017-06-09 VITALS
DIASTOLIC BLOOD PRESSURE: 90 MMHG | HEIGHT: 71 IN | TEMPERATURE: 97.6 F | BODY MASS INDEX: 22.96 KG/M2 | WEIGHT: 164.02 LBS | OXYGEN SATURATION: 96 % | SYSTOLIC BLOOD PRESSURE: 159 MMHG | HEART RATE: 77 BPM | RESPIRATION RATE: 16 BRPM

## 2017-06-09 PROBLEM — Z98.890 S/P LAPAROTOMY: Status: ACTIVE | Noted: 2017-06-09

## 2017-06-09 LAB
COPATH REPORT: NORMAL
GLUCOSE BLDC GLUCOMTR-MCNC: 111 MG/DL (ref 70–99)

## 2017-06-09 PROCEDURE — 25000132 ZZH RX MED GY IP 250 OP 250 PS 637: Mod: GY | Performed by: SURGERY

## 2017-06-09 PROCEDURE — 25000132 ZZH RX MED GY IP 250 OP 250 PS 637: Mod: GY | Performed by: STUDENT IN AN ORGANIZED HEALTH CARE EDUCATION/TRAINING PROGRAM

## 2017-06-09 PROCEDURE — 12000001 ZZH R&B MED SURG/OB UMMC

## 2017-06-09 PROCEDURE — 00000146 ZZHCL STATISTIC GLUCOSE BY METER IP

## 2017-06-09 PROCEDURE — A9270 NON-COVERED ITEM OR SERVICE: HCPCS | Mod: GY | Performed by: SURGERY

## 2017-06-09 PROCEDURE — A9270 NON-COVERED ITEM OR SERVICE: HCPCS | Mod: GY | Performed by: STUDENT IN AN ORGANIZED HEALTH CARE EDUCATION/TRAINING PROGRAM

## 2017-06-09 RX ORDER — ACETAMINOPHEN 325 MG/1
650 TABLET ORAL 4 TIMES DAILY
Status: DISCONTINUED | OUTPATIENT
Start: 2017-06-09 | End: 2017-06-09 | Stop reason: HOSPADM

## 2017-06-09 RX ORDER — HYDROXYZINE HYDROCHLORIDE 25 MG/1
25-50 TABLET, FILM COATED ORAL EVERY 6 HOURS PRN
Status: DISCONTINUED | OUTPATIENT
Start: 2017-06-09 | End: 2017-06-09 | Stop reason: HOSPADM

## 2017-06-09 RX ORDER — OXYCODONE HYDROCHLORIDE 5 MG/1
5 TABLET ORAL EVERY 4 HOURS PRN
Qty: 30 TABLET | Refills: 0 | Status: SHIPPED | OUTPATIENT
Start: 2017-06-09 | End: 2017-10-16

## 2017-06-09 RX ORDER — OXYCODONE HYDROCHLORIDE 5 MG/1
5-10 TABLET ORAL
Status: DISCONTINUED | OUTPATIENT
Start: 2017-06-09 | End: 2017-06-09 | Stop reason: HOSPADM

## 2017-06-09 RX ORDER — ACETAMINOPHEN 325 MG/1
650 TABLET ORAL 4 TIMES DAILY
Status: DISCONTINUED | OUTPATIENT
Start: 2017-06-09 | End: 2017-06-09

## 2017-06-09 RX ORDER — AMOXICILLIN 250 MG
1 CAPSULE ORAL 2 TIMES DAILY
Qty: 60 TABLET | Refills: 1 | Status: SHIPPED | OUTPATIENT
Start: 2017-06-09 | End: 2017-10-16

## 2017-06-09 RX ORDER — LANOLIN ALCOHOL/MO/W.PET/CERES
3 CREAM (GRAM) TOPICAL
Status: DISCONTINUED | OUTPATIENT
Start: 2017-06-09 | End: 2017-06-09 | Stop reason: HOSPADM

## 2017-06-09 RX ADMIN — HYDROXYZINE HYDROCHLORIDE 25 MG: 25 TABLET ORAL at 15:16

## 2017-06-09 RX ADMIN — HYDROXYZINE HYDROCHLORIDE 25 MG: 25 TABLET ORAL at 02:41

## 2017-06-09 RX ADMIN — OXYCODONE HYDROCHLORIDE 5 MG: 5 TABLET ORAL at 01:12

## 2017-06-09 RX ADMIN — OXYCODONE HYDROCHLORIDE 5 MG: 5 TABLET ORAL at 02:41

## 2017-06-09 RX ADMIN — OXYCODONE HYDROCHLORIDE 5 MG: 5 TABLET ORAL at 15:16

## 2017-06-09 RX ADMIN — OXYCODONE HYDROCHLORIDE 5 MG: 5 TABLET ORAL at 09:00

## 2017-06-09 RX ADMIN — OXYCODONE HYDROCHLORIDE 5 MG: 5 TABLET ORAL at 12:49

## 2017-06-09 RX ADMIN — MELATONIN TAB 3 MG 3 MG: 3 TAB at 01:12

## 2017-06-09 RX ADMIN — OXYCODONE HYDROCHLORIDE 5 MG: 5 TABLET ORAL at 05:47

## 2017-06-09 RX ADMIN — HYDROXYZINE HYDROCHLORIDE 25 MG: 25 TABLET ORAL at 09:00

## 2017-06-09 RX ADMIN — ACETAMINOPHEN 650 MG: 325 TABLET, FILM COATED ORAL at 15:16

## 2017-06-09 ASSESSMENT — PAIN DESCRIPTION - DESCRIPTORS
DESCRIPTORS: DULL;ACHING
DESCRIPTORS: ACHING;SORE
DESCRIPTORS: DULL;ACHING
DESCRIPTORS: ACHING;SORE
DESCRIPTORS: ACHING;DULL
DESCRIPTORS: DULL;ACHING

## 2017-06-09 NOTE — PROGRESS NOTES
Surgical Oncology Progress Note    Interval History: Uneventful evening. Pain initially difficult to control, now improved. Denies nausea or vomiting, taking small amounts of liquid diet this morning.    Physical Exam:   Temp:  [96.8  F (36  C)-98.7  F (37.1  C)] 98.7  F (37.1  C)  Pulse:  [77-80] 77  Heart Rate:  [61-83] 65  Resp:  [10-18] 16  BP: (119-181)/() 153/87  SpO2:  [94 %-100 %] 94 %  General: Alert, oriented, appears comfortable, NAD.  Respiratory: breathing non labored  Abdomen: Abdomen is soft, appropriately tender, non-distended. Incision is clean, dry and intact. Slight bruising at inferior end     Data:   All laboratory and imaging data in the past 24 hours reviewed  I/O last 3 completed shifts:  In: 2080 [P.O.:480; I.V.:1600]  Out: 1865 [Urine:1815; Blood:50]  Recent Labs   Lab Test  06/08/17   0844  05/12/17   0818  04/27/17   0827   WBC  56.1*  65.7*  72.4*   HGB  14.2  13.0*  13.5   PLT  118*  136*  152      Recent Labs   Lab Test  06/08/17   0844  06/01/17   1128  04/27/17   0827  01/19/17   0828   NA   --   139  139  141   POTASSIUM  4.2  4.7  5.2  4.7   CHLORIDE   --   103  104  104   CO2   --   30  31  30   BUN   --   18  12  20   CR  0.98  0.96  0.96  1.04   ANIONGAP   --   6  4  7   AXEL   --   8.7  8.4*  8.5   GLC  97  100*  95  93        Recent Labs   Lab Test  06/01/17   1128   PROTTOTAL  7.2   ALBUMIN  4.3   BILITOTAL  0.7   ALKPHOS  61   AST  28   ALT  35       Assessment and Plan: 66 year old man with history of CLL who underwent exploratory laparoscopy, open mesenteric lymph node biopsy on 6/8. Doing well post-operatively. Awaiting return of bowel function. Will work on pain control with possible discharge later today if no nausea/vomiting.      Willy Amaro MD   Surgical Oncology

## 2017-06-09 NOTE — PROGRESS NOTES
Care Coordinator Progress Note     Admission Date/Time:  6/8/2017  Attending MD:  Melba Goodrich MD     Data  Chart reviewed, discussed with interdisciplinary team.   Patient was admitted for: Acute post-operative pain.    Concerns with insurance coverage for discharge needs: None.  Current Living Situation: Patient lives with spouse.  Support System: Supportive  Services Involved: None at admission  Transportation: Family or Friend will provide  Barriers to Discharge: None    Assessment    Writer spoke with the patient at the bedside to assess for discharge needs. The patient is awaiting probable discharge orders, wife will provide a ride home. No discharge needs identified at this time.     Plan  Anticipated Discharge Date: 06/09/17  Anticipated Discharge Plan:  Home with assist     Anahy Alejandre RN Care Coordinator

## 2017-06-09 NOTE — PROVIDER NOTIFICATION
Pt having trouble sleeping. Requesting sleeping aid. On call text paged. Will continue to monitor.

## 2017-06-09 NOTE — PROGRESS NOTES
Discharge instruction reviewed.  Patient verbalized understanding. PIV removed, transport here with w/c to patient to discharge pharmacy then main lobby. Family awaiting at main lobby. Patient discharged with all belongings.

## 2017-06-09 NOTE — PROGRESS NOTES
"Surgery Post-Op Check  6/8/2017 11:31 PM     No acute events postoperatively. Pain well controlled. Denies nausea, vomiting, chest pain, shortness of breath. Voiding independently. No other complaints.      BP (!) 181/106 (BP Location: Left arm)  Pulse 74  Temp 98.1  F (36.7  C) (Oral)  Resp 18  Ht 1.803 m (5' 11\")  Wt 74.4 kg (164 lb 0.4 oz)  SpO2 97%  BMI 22.88 kg/m2    NAD, resting in bed, alert  RRR, NLB  Abd soft, nondistended, mildly tender. No rebound or guarding. Incision C/D/I.    UOP: 500mL past 3 hours      A/P:  66 year old male POD 0 s/p Diagnostic Laparoscopy, Laparotomy, Open excisional biopsy of mesenteric lymph node, doing well postoperatively.     - Continue pain control  - Hydralazine PRN HTN  - NPO  - IVF    Agustin Shrestha MD  PGY-1 General Surgery  [facility]              "

## 2017-06-09 NOTE — PROGRESS NOTES
Patient seen with Dr Amaro.  Pain relatively controlled.  No N/V.  Abdomen soft, non-tender, not distended.  Plan:  1. Possible discharge home today vs tomorrow, pending return of bowel function. Continue clears.  2. Mobilize. Incentive spirometry.    Melba Goodrich MD [facility]Sc [facility]    Division of Surgical Oncology  [facility]

## 2017-06-09 NOTE — PROGRESS NOTES
S/p Laparoscopy, Laparatomy Open resection of Mesenteric Lymph Node. Three lap sites. CDI with liquid bandage.   Pain controlled with dilaudid 0.5 and oxycodone 5mg  PRN. IV fluid LR running at 100ml/hr. Pt voided and tolerating ice chips. Denies nausea. vss except BP slight elevated MD aware.

## 2017-06-09 NOTE — DISCHARGE SUMMARY
"[facility]   Surgical Oncology Discharge Summary  Ulysses Purcell MRN# [id number]   Age: 66 year old YOB: 1950     Date of Admission:  6/8/2017  Date of Discharge::  6/9/2017  Admitting Physician:  Melba Goodrich MD  Discharge Physician:  Melba Goodrich MD     PCP:  Juan Kenney  Date of Admission: 6/8/2017  Date of Discharge: 6/9/2017    Admission Diagnosis:  Chronic Lymphocytic Leukemia  CLL (chronic lymphocytic leukemia) (H)  S/P laparotomy    Discharge Diagnosis:  Chronic Lymphocytic Leukemia  CLL (chronic lymphocytic leukemia) (H)  S/P laparotomy    Consultations:  None    Procedures:  1. Exploratory laparoscopy, laparotomy, open mesenteric lymph node biopsy by Dr Goodrich    Brief HPI:  66 year old year old male with history of CLL. Recent CT scan appeared to demonstrate large intra-abdominal mass concerning for transformation of his CLL. After discussion of risks and potential benefits he elected to proceed with procedure as recommended.      Hospital Course:  The patient was admitted and underwent the above procedure. The patient tolerated the procedure well. The patient recovered well with no post-operative complications. Prior to discharge pain was controlled with oral pain medication and the patient was able to ambulate and void without difficulty. The patient received appropriate education post operatively. On POD #1 the patient was discharged to home.    Discharge Physical Exam:  /90 (BP Location: Left arm)  Pulse 77  Temp 97.6  F (36.4  C) (Oral)  Resp 16  Ht 1.803 m (5' 11\")  Wt 74.4 kg (164 lb 0.4 oz)  SpO2 96%  BMI 22.88 kg/m2    Gen: NAD  Lungs: non-labored breathing  CV: regular rhythm, normal rate   Abd: soft, nondistended, appropriately tender, incisions are c/d/i with skin glue in place   Ext: no peripheral edema  Neuro: AOx3    Meds:     Review of your medicines      START taking       Dose / Directions    " oxyCODONE 5 MG IR tablet   Commonly known as:  ROXICODONE   Used for:  Acute post-operative pain        Dose:  5 mg   Take 1 tablet (5 mg) by mouth every 4 hours as needed for moderate to severe pain   Quantity:  30 tablet   Refills:  0       senna-docusate 8.6-50 MG per tablet   Commonly known as:  SENOKOT-S;PERICOLACE   Used for:  Drug induced constipation        Dose:  1 tablet   Take 1 tablet by mouth 2 times daily   Quantity:  60 tablet   Refills:  1         CONTINUE these medicines which have NOT CHANGED       Dose / Directions    aspirin 81 MG tablet        Take  by mouth daily.   Refills:  0       calcium 600/vitamin D 600-400 MG-UNIT per tablet   Generic drug:  calcium-vitamin D        Dose:  1 tablet   Take 1 tablet by mouth daily.   Refills:  0       CoQ10 100 MG Caps        Take  by mouth daily.   Refills:  0       fish oil-omega-3 fatty acids 1000 MG capsule        Dose:  1 capsule   Take 1 capsule by mouth daily.   Refills:  0       Garlic 1000 MG Caps        Take  by mouth daily.   Refills:  0       GLUCOSAMINE CHONDR COMPLEX PO        Take  by mouth daily.   Refills:  0       iron 325 (65 FE) MG tablet        Dose:  1 tablet   Take 1 tablet by mouth daily.   Refills:  0       lisinopril 30 MG tablet   Commonly known as:  PRINIVIL,ZESTRIL   Used for:  Essential hypertension        TAKE 1 TABLET DAILY BY MOUT H   Quantity:  90 tablet   Refills:  2       MAGNESIUM OXIDE PO   Used for:  CLL (chronic lymphocytic leukemia) (H)        Dose:  400 mg   Take 400 mg by mouth   Refills:  0       MULTI-B COMPLEX PO        Take  by mouth daily.   Refills:  0       MULTIPLE VITAMIN PO        Take  by mouth daily.   Refills:  0       multivitamin Tabs tablet        Dose:  1 tablet   Take 1 tablet by mouth daily   Refills:  0       UNABLE TO FIND   Used for:  CLL (chronic lymphocytic leukemia) (H), Mesenteric mass        MEDICATION NAME: lipoflavanoid for sinus   Refills:  0            Where to get your medicines     "  These medications were sent to [facility] - [place], [place] - [address]  [address], [facility] [address]     Phone:  [phone]      senna-docusate 8.6-50 MG per tablet         Some of these will need a paper prescription and others can be bought over the counter. Ask your nurse if you have questions.     Bring a paper prescription for each of these medications      oxyCODONE 5 MG IR tablet             Discharge Instructions:    Discharge Instructions   From Dr. Goodrich:    1. Patient to follow up with appointment in 2 weeks. Your pathology report will be reviewed with you in person in clinic.  2. Do not drive while taking narcotic pain medication (oxycodone).   3. May take plain Tylenol as needed for pain in conjunction with oxycodone.  4. Avoid non-steroidal anti-inflammatory medications (Advil, Ibuprofen, Naproxen, aspirin, etc) for 5-7 days.   5. Caution with putting ice on the incision as it will be numb you will not realize it if you leave the ice for too long and can damage your skin.  6. If you develop any abdominal pain, nausea/vomiting, inability to pass gas or stool, fever/chills, worsening pain, redness, swelling, or drainage from your wound please call the clinic (Monday through Friday 8:00am-5:00pm [phone] Estephanie MASON) or on-call surgical oncology resident (nights and weekends [phone] and ask \"I would like to page the Surgical Oncology Resident on call.\")   7. No lifting over 15 lbs and no strenuous physical activity for 4-6 weeks.  No bed rest; up and walking around is good for preventing blood clots in the legs.  8. Okay to shower in tomorrow. Please refrain from submerging in a bathtub or swimming pool. Please refrain from applying alcohol or peroxide to the incision.           "

## 2017-06-09 NOTE — PROGRESS NOTES
MD Shrestha paged for inadequate pain management. Pain level remains the same per pt report despite PRN analgesics and ice application. Oxycodone dose increased and Atarax added for adjuvant pain management. Orders carried out. Will continue to monitor pain level and treat as prescribed.

## 2017-06-09 NOTE — PROGRESS NOTES
"S/p Laparoscopy, Laparatomy Open resection of Mesenteric Lymph Node. Pt A&OX4, AVSS. Abdominal incision ANTELMO, dermabonded and free of drainage. Adequate abdominal pain control achieved with Oxycodone and Atarax; pt was finally able to sleep.NPO and ice chips diet upgraded to clear liquid. Pt tolerating PO and denies n/v. IVF infusing, voiding independently.Will continue to monitor.  /90  Pulse 77  Temp 97.9  F (36.6  C) (Oral)  Resp 14  Ht 1.803 m (5' 11\")  Wt 74.4 kg (164 lb 0.4 oz)  SpO2 98%  BMI 22.88 kg/m2    "

## 2017-06-09 NOTE — PROGRESS NOTES
Patient POD 1 s/p Laparoscopy, Lapatomy Open resection of Mesenteric Lymph Node. Patient A&O x 4, VSS. Abdominal incision and 2 px sites ANTELMO, erythema, no drainage noted, Dermabond intact.  Pain controlled with prn Oxycodone and Atarax. Patient tolerating clear liquid diet and advanced to full liquid, denies nausea. Voiding spontaneously. PIV SL. IS provided and encouraged, pt achieved 1500 ml. BS active x 4, encouraged increase activity, patient walked in room to bathroom and hallway twice this afternoon. Ordered prune juice and potato soup and tolerated well, states passing gas and feels bowels starting to move. Patient ready for discharge, Surgical Oncology team paged.

## 2017-06-12 DIAGNOSIS — Z98.890 S/P LAPAROTOMY: Primary | ICD-10-CM

## 2017-06-12 DIAGNOSIS — C91.10 CLL (CHRONIC LYMPHOCYTIC LEUKEMIA) (H): ICD-10-CM

## 2017-06-14 LAB — COPATH REPORT: NORMAL

## 2017-06-15 ENCOUNTER — OFFICE VISIT (OUTPATIENT)
Dept: PEDIATRICS | Facility: OTHER | Age: 67
End: 2017-06-15
Attending: INTERNAL MEDICINE
Payer: COMMERCIAL

## 2017-06-15 VITALS
HEART RATE: 73 BPM | BODY MASS INDEX: 22.68 KG/M2 | WEIGHT: 162 LBS | OXYGEN SATURATION: 98 % | TEMPERATURE: 97.9 F | SYSTOLIC BLOOD PRESSURE: 130 MMHG | HEIGHT: 71 IN | DIASTOLIC BLOOD PRESSURE: 90 MMHG

## 2017-06-15 DIAGNOSIS — Z98.890 S/P LAPAROTOMY: Primary | ICD-10-CM

## 2017-06-15 DIAGNOSIS — I10 ESSENTIAL HYPERTENSION: ICD-10-CM

## 2017-06-15 PROCEDURE — 99212 OFFICE O/P EST SF 10 MIN: CPT

## 2017-06-15 PROCEDURE — 99213 OFFICE O/P EST LOW 20 MIN: CPT | Performed by: INTERNAL MEDICINE

## 2017-06-15 ASSESSMENT — ENCOUNTER SYMPTOMS
SHORTNESS OF BREATH: 0
MYALGIAS: 0
COUGH: 0
DIZZINESS: 0
VOMITING: 0
HEARTBURN: 0
FEVER: 0
WHEEZING: 0
WEAKNESS: 0
HEADACHES: 0
BRUISES/BLEEDS EASILY: 0
CONSTIPATION: 0
DYSURIA: 0
DIAPHORESIS: 0
HEMATURIA: 0
ABDOMINAL PAIN: 0
CHILLS: 0
PALPITATIONS: 0
DIARRHEA: 0
BLOOD IN STOOL: 0
NAUSEA: 0

## 2017-06-15 ASSESSMENT — PAIN SCALES - GENERAL: PAINLEVEL: NO PAIN (0)

## 2017-06-15 NOTE — MR AVS SNAPSHOT
After Visit Summary   6/15/2017    Ulysses Purcell    MRN: [id number]           Patient Information     Date Of Birth          1950        Visit Information        Provider Department      6/15/2017 9:00 AM Juan Kenney DO [facility]        Today's Diagnoses     S/P laparotomy    -  1    Essential hypertension           Follow-ups after your visit        Follow-up notes from your care team     Return in about 4 months (around 10/15/2017), or annual exam.      Your next 10 appointments already scheduled     Oct 16, 2017  9:20 AM CDT   (Arrive by 9:00 AM)   PHYSICAL with Juan Kenney DO   [facility] [place] ([facility] [place] )    [address]  [place] [address]   [phone]              Who to contact     If you have questions or need follow up information about today's clinic visit or your schedule please contact [facility] directly at [phone].  Normal or non-critical lab and imaging results will be communicated to you by [employer], letter or phone within 4 business days after the clinic has received the results. If you do not hear from us within 7 days, please contact the clinic through [employer] or phone. If you have a critical or abnormal lab result, we will notify you by phone as soon as possible.  Submit refill requests through [employer] or call your pharmacy and they will forward the refill request to us. Please allow 3 business days for your refill to be completed.          Additional Information About Your Visit        MyChart Information     [employer] gives you secure access to your electronic health record. If you see a primary care provider, you can also send messages to your care team and make appointments. If you have questions, please call your primary care clinic.  If you do not have a primary care provider, please call [phone] and they will assist you.        Care EveryWhere ID     This is your Care  "EveryWhere ID. This could be used by other organizations to access your [place] medical records  [id number]        Your Vitals Were     Pulse Temperature Height Pulse Oximetry BMI (Body Mass Index)       73 97.9  F (36.6  C) (Tympanic) 5' 11\" (1.803 m) 98% 22.59 kg/m2        Blood Pressure from Last 3 Encounters:   06/15/17 130/90   06/09/17 159/90   06/01/17 150/76    Weight from Last 3 Encounters:   06/15/17 162 lb (73.5 kg)   06/08/17 164 lb 0.4 oz (74.4 kg)   06/01/17 168 lb 3.2 oz (76.3 kg)              Today, you had the following     No orders found for display       Primary Care Provider Office Phone # Fax #    Juan Kenney [phone]       [facility] [place] [address] [address]        Thank you!     Thank you for choosing [facility]  for your care. Our goal is always to provide you with excellent care. Hearing back from our patients is one way we can continue to improve our services. Please take a few minutes to complete the written survey that you may receive in the mail after your visit with us. Thank you!             Your Updated Medication List - Protect others around you: Learn how to safely use, store and throw away your medicines at www.disposemymeds.org.          This list is accurate as of: 6/15/17  9:21 AM.  Always use your most recent med list.                   Brand Name Dispense Instructions for use    aspirin 81 MG tablet      Take  by mouth daily.       calcium 600/vitamin D 600-400 MG-UNIT per tablet   Generic drug:  calcium-vitamin D      Take 1 tablet by mouth daily.       CoQ10 100 MG Caps      Take  by mouth daily.       fish oil-omega-3 fatty acids 1000 MG capsule      Take 1 capsule by mouth daily.       Garlic 1000 MG Caps      Take  by mouth daily.       GLUCOSAMINE CHONDR COMPLEX PO      Take  by mouth daily.       iron 325 (65 FE) MG tablet      Take 1 tablet by mouth daily.       lisinopril 30 MG tablet    " PRINIVIL,ZESTRIL    90 tablet    TAKE 1 TABLET DAILY BY MOUT H       MAGNESIUM OXIDE PO      Take 400 mg by mouth       MULTI-B COMPLEX PO      Take  by mouth daily.       MULTIPLE VITAMIN PO      Take  by mouth daily.       multivitamin Tabs tablet      Take 1 tablet by mouth daily       oxyCODONE 5 MG IR tablet    ROXICODONE    30 tablet    Take 1 tablet (5 mg) by mouth every 4 hours as needed for moderate to severe pain       senna-docusate 8.6-50 MG per tablet    SENOKOT-S;PERICOLACE    60 tablet    Take 1 tablet by mouth 2 times daily

## 2017-06-15 NOTE — NURSING NOTE
"Chief Complaint   Patient presents with     RECHECK     pt here for a follow up today- states during CT found a mass in abdomin and when he had surgery done they could not find the mass- has a follow up in the [facility] a week from today- did take out lymph nodes       Initial /90 (BP Location: Right arm, Patient Position: Supine, Cuff Size: Adult Regular)  Pulse 73  Temp 97.9  F (36.6  C) (Tympanic)  Ht 5' 11\" (1.803 m)  Wt 162 lb (73.5 kg)  SpO2 98%  BMI 22.59 kg/m2 Estimated body mass index is 22.59 kg/(m^2) as calculated from the following:    Height as of this encounter: 5' 11\" (1.803 m).    Weight as of this encounter: 162 lb (73.5 kg).  Medication Reconciliation: complete   Maribeth Pereira LPN      "

## 2017-06-15 NOTE — PROGRESS NOTES
HPI  Patient is a 67 yo male with CLL who presents to go over findings of his recent surgery.  He had a laparotomy to biopsy a mass seen on CT and none was found in the surgery.    Additionally, he has been monitpring his blood pressures.  Results are 131-142/ 85-90, He has one blood pressure greater than 140 SBP.    Past Medical History:   Diagnosis Date     Chronic lymphocytic leukemia (CLL), T-cell (H)     white counts in the 34,000 to 40,000      Elevated blood pressure reading without diagnosis of hypertension 10/16/2002     Fracture of metatarsal bone(s), closed 10/29/2001    right 5th metatarsal bone     Hypertension      Other orthopedic aftercare(V54.89) 11/08/2001     Pericarditis     2005     Routine general medical examination at [facility] 12/01/2000     Special screening for malignant neoplasms, colon 02/04/2004     Past Surgical History:   Procedure Laterality Date     BACK SURGERY  1997    L4-L5 partial laminectomy     COLONOSCOPY  02/04/2004    No polyps     COLONOSCOPY  8/4/2014    Procedure: COLONOSCOPY;  Surgeon: Meliza Morales MD;  Location: [facility] OR     HC LEFT HEART CATHETERIZATION  05/10/2005    Normal angiogram     LAMINECTOMY CERIVCAL POSTERIOR ONE LEVEL  1997     LAPAROSCOPY DIAGNOSTIC (GENERAL) N/A 6/8/2017    Procedure: LAPAROSCOPY DIAGNOSTIC (GENERAL);  Diagnostic Laparoscopy, Laparatomy Open resection of Mesenteric Lymph Node ;  Surgeon: Melba Goodrich MD;  Location: [facility] OR         Review of Systems   Constitutional: Negative for chills, diaphoresis, fever and malaise/fatigue.   Respiratory: Negative for cough, shortness of breath and wheezing.    Cardiovascular: Negative for chest pain, palpitations and leg swelling.   Gastrointestinal: Negative for abdominal pain, blood in stool, constipation, diarrhea, heartburn, melena, nausea and vomiting.   Genitourinary: Negative for dysuria and hematuria.   Musculoskeletal: Negative for myalgias.   Neurological: Negative for dizziness,  weakness and headaches.   Endo/Heme/Allergies: Does not bruise/bleed easily.         Physical Exam   Constitutional: He is oriented to person, place, and time and well-developed, well-nourished, and in no distress. No distress.   Eyes: No scleral icterus.   Neck: Neck supple.   Cardiovascular: Normal rate, regular rhythm, normal heart sounds and intact distal pulses.    No murmur heard.  Pulses:       Radial pulses are 2+ on the right side, and 2+ on the left side.   Pulmonary/Chest: Effort normal and breath sounds normal. He has no wheezes. He has no rales.   Abdominal: Soft. Bowel sounds are normal. He exhibits no shifting dullness, no distension, no pulsatile midline mass and no mass. There is no hepatosplenomegaly. There is no tenderness.       Musculoskeletal: He exhibits no edema.   Lymphadenopathy:     He has no cervical adenopathy.   Neurological: He is alert and oriented to person, place, and time.       Labs:  NA      Imaging:  NA      ASSESSMENT /PLAN:  (Z98.890) S/P laparotomy  (primary encounter diagnosis)  Comment: His surgical scars are healing well and no sings of infection.  We discussed the results of his laparotomy and the lack of any mass as a possible over read by radiology.  He has a FISH and micro array analysis pending  Plan:  He will follow up with oncology and his oncology surgeon.    (I10) Essential hypertension  Comment: At goal.  Plan:   He will continue Lisinopril 30 mg daily.        Follow up with Provider - 4 months for an annual exam.        Juan Kenney DO

## 2017-06-19 LAB — COPATH REPORT: NORMAL

## 2017-06-26 ENCOUNTER — RADIANT APPOINTMENT (OUTPATIENT)
Dept: CT IMAGING | Facility: CLINIC | Age: 67
End: 2017-06-26
Attending: SURGERY
Payer: COMMERCIAL

## 2017-06-26 ENCOUNTER — OFFICE VISIT (OUTPATIENT)
Dept: SURGERY | Facility: CLINIC | Age: 67
End: 2017-06-26
Payer: COMMERCIAL

## 2017-06-26 VITALS — DIASTOLIC BLOOD PRESSURE: 74 MMHG | SYSTOLIC BLOOD PRESSURE: 162 MMHG | HEART RATE: 68 BPM

## 2017-06-26 DIAGNOSIS — C91.10 CLL (CHRONIC LYMPHOCYTIC LEUKEMIA) (H): Primary | ICD-10-CM

## 2017-06-26 DIAGNOSIS — C91.10 CLL (CHRONIC LYMPHOCYTIC LEUKEMIA) (H): ICD-10-CM

## 2017-06-26 DIAGNOSIS — Z98.890 S/P LAPAROTOMY: ICD-10-CM

## 2017-06-26 LAB — COPATH REPORT: NORMAL

## 2017-06-26 PROCEDURE — 99024 POSTOP FOLLOW-UP VISIT: CPT | Performed by: SURGERY

## 2017-06-26 PROCEDURE — 74177 CT ABD & PELVIS W/CONTRAST: CPT | Performed by: RADIOLOGY

## 2017-06-26 RX ORDER — IOPAMIDOL 755 MG/ML
99 INJECTION, SOLUTION INTRAVASCULAR ONCE
Status: COMPLETED | OUTPATIENT
Start: 2017-06-26 | End: 2017-06-26

## 2017-06-26 RX ADMIN — IOPAMIDOL 99 ML: 755 INJECTION, SOLUTION INTRAVASCULAR at 11:39

## 2017-06-26 NOTE — MR AVS SNAPSHOT
After Visit Summary   6/26/2017    Ulysses Purcell    MRN: [id number]           Patient Information     Date Of Birth          1950        Visit Information        Provider Department      6/26/2017 1:00 PM Melba Goodrich MD [facility]        Today's Diagnoses     CLL (chronic lymphocytic leukemia) (H)    -  1       Follow-ups after your visit        Your next 10 appointments already scheduled     Oct 16, 2017  9:20 AM CDT   (Arrive by 9:00 AM)   PHYSICAL with Juan Kenney, DO   [facility] [place] ([facility] [place] )    [address]  [place] [address]   [phone]              Who to contact     If you have questions or need follow up information about today's clinic visit or your schedule please contact [facility] directly at [phone].  Normal or non-critical lab and imaging results will be communicated to you by [employer], letter or phone within 4 business days after the clinic has received the results. If you do not hear from us within 7 days, please contact the clinic through [employer] or phone. If you have a critical or abnormal lab result, we will notify you by phone as soon as possible.  Submit refill requests through [facility] or call your pharmacy and they will forward the refill request to us. Please allow 3 business days for your refill to be completed.          Additional Information About Your Visit        MyChart Information     [facility] gives you secure access to your electronic health record. If you see a primary care provider, you can also send messages to your care team and make appointments. If you have questions, please call your primary care clinic.  If you do not have a primary care provider, please call [phone] and they will assist you.      [facility] is an electronic gateway that provides easy, online access to your medical records. With [facility], you can request a clinic appointment, read  your test results, renew a prescription or communicate with your care team.     To access your existing account, please contact your [facility] or call [phone] for assistance.        Care EveryWhere ID     This is your Care EveryWhere ID. This could be used by other organizations to access your [place] medical records  [id number]        Your Vitals Were     Pulse                   68            Blood Pressure from Last 3 Encounters:   06/26/17 162/74   06/15/17 130/90   06/09/17 159/90    Weight from Last 3 Encounters:   06/15/17 73.5 kg (162 lb)   06/08/17 74.4 kg (164 lb 0.4 oz)   06/01/17 76.3 kg (168 lb 3.2 oz)              Today, you had the following     No orders found for display       Primary Care Provider Office Phone # Fax #    Juan Kenney [phone]       [facility] [place] [address]  [place] [address]        Equal Access to Services     ANNA LARA : Hadii aad ku hadasho Soomaali, waaxda luqadaha, qaybta kaalmada adeegyada, waxay idiin hayaan adeeg kharash la'aan . So [facility] [phone].    ATENCIÓN: Si habla español, tiene a zamora disposición servicios gratuitos de asistencia lingüística. Llame al [phone].    We comply with applicable federal civil rights laws and [place] laws. We do not discriminate on the basis of race, color, national origin, age, disability sex, sexual orientation or gender identity.            Thank you!     Thank you for choosing [facility]  for your care. Our goal is always to provide you with excellent care. Hearing back from our patients is one way we can continue to improve our services. Please take a few minutes to complete the written survey that you may receive in the mail after your visit with us. Thank you!             Your Updated Medication List - Protect others around you: Learn how to safely use, store and throw away your medicines at www.disposemymeds.org.           This list is accurate as of: 6/26/17 11:59 PM.  Always use your most recent med list.                   Brand Name Dispense Instructions for use Diagnosis    aspirin 81 MG tablet      Take  by mouth daily.        calcium 600/vitamin D 600-400 MG-UNIT per tablet   Generic drug:  calcium-vitamin D      Take 1 tablet by mouth daily.        CoQ10 100 MG Caps      Take  by mouth daily.        fish oil-omega-3 fatty acids 1000 MG capsule      Take 1 capsule by mouth daily.        Garlic 1000 MG Caps      Take  by mouth daily.        GLUCOSAMINE CHONDR COMPLEX PO      Take  by mouth daily.        iron 325 (65 FE) MG tablet      Take 1 tablet by mouth daily.        lisinopril 30 MG tablet    PRINIVIL,ZESTRIL    90 tablet    TAKE 1 TABLET DAILY BY MOUT H    Essential hypertension       MAGNESIUM OXIDE PO      Take 400 mg by mouth    CLL (chronic lymphocytic leukemia) (H)       MULTI-B COMPLEX PO      Take  by mouth daily.        MULTIPLE VITAMIN PO      Take  by mouth daily.        multivitamin Tabs tablet      Take 1 tablet by mouth daily        oxyCODONE 5 MG IR tablet    ROXICODONE    30 tablet    Take 1 tablet (5 mg) by mouth every 4 hours as needed for moderate to severe pain    Acute post-operative pain       senna-docusate 8.6-50 MG per tablet    SENOKOT-S;PERICOLACE    60 tablet    Take 1 tablet by mouth 2 times daily    Drug induced constipation

## 2017-06-26 NOTE — PROGRESS NOTES
FOLLOW-UP  Jun 26, 2017    Ulysses Purcell is a 66 year old male who returns for his first post-operative follow-up visit.    HPI:    He underwent a laparotomy on 6/8/2017 for a presumed 10 cm mesenteric node.  He is currently 2 week(s) post-op.  At the time of surgery, no 10 cm mass was found.  Some prominent (1-2 cm) mesenteric nodes were seen and biopsied. Final surgical pathology showed CLL.    Since the procedure, he has been doing well. He has minimal pain. He has been avoiding heavy lifting. His appetite has returned and he denies any nausea/vomiting, constipation or diarrhea. He denies any rectal bleeding.    I ordered a repeat CT abdomen/pelvis today to reevaluate and determine whether that 10 cm mass was actually some unopacified bowel.    /74  Pulse 68   Physical Exam   Constitutional: He is well-developed, well-nourished, and in no distress.   Pulmonary/Chest: No respiratory distress.   Abdominal:   Well healed midline incision without hernia.   Skin: Skin is warm and dry.       INVESTIGATIONS:    CT abdomen/pelvis (6/26/2017)  FINDINGS:  Lung bases are clear.  2 right-sided adrenal nodules are stable in size when compared to 8/29/2013, and therefore [place] benign. Largest measures 2.3 cm on series 3 image 58, with a smaller lesion measuring 2.2 cm on series 3 image 48. Spleen is enlarged, measuring approximately 15.1 cm, increased from 2013. Liver demonstrates small hypodensities which are too small to fully characterize on this exam as well as a small cyst in the left hepatic lobe. The gallbladder at is unremarkable. Small right renal hypodensities are too small to further characterize but likely benign. Small hypodensity in the pancreatic tail measuring 5 mm is likely a small pancreatic cyst.    Bladder densities mild diffuse wall thickening. Prostatic calcifications. Small free fluid in the pelvis. Sigmoid colon is decompressed. Small bowel has normal caliber.  Ill-defined fluid or soft  "tissue is seen within the mesentery, perhaps best seen on series 3 image 78 near the transverse colon. There are prominent noam hepatis soft tissue densities likely representing lymph nodes measuring up to 1.2 cm in short axis liver (series 3 image 43. Portacaval lymph node on series 3 image 53 measures 1.2 cm. Upper retroperitoneal lymphadenopathy is ill-defined but increased over normal expectations.  Degenerative changes in the spine. No aggressive bone lesion.  IMPRESSION:  1. Ill-defined soft tissue in the mesentery with upper abdominal adenopathy. Comparison is limited as no priors are available since a 2013 PET/CT. Appearance is compatible with provided history of CLL.  2. Splenomegaly.  3.Benign right adrenal nodules are stable since 2013.       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    We proceeded with a laparotomy for an excisional biopsy of a 10 cm mass presumed to be transformation to lymphoma. At the time of laparotomy, no mass was seen. On repeat CT abdomen/pelvis today, again no mass was seen.  It is likely that the \"mass\" initially seen was intervening unopacified bowel (unusual given PO contrast seen the in stomach and then in the distal bowel).  The fluid seen is likely related to postoperative changes.     He has healed well from his laparotomy incision. I cautioned that he should continue to avoid heavy lifting for a total of 4-6 weeks after surgery.    This is an usual situation but Ulysses Purcell and his wife are relieved that there was no 10 cm mass. He will follow up with Dr Booker.  I have not made specific follow-up appointments with me at this time " and will defer ongoing care to his PCP.  He knows to call if he has concerns.     All of the above was discussed with the patient and all questions were answered.     PLAN:  1.  Follow up with Dr Booker  2. Defer ongoing care to Dr Booker and to his PCP.    Melba Goodrich MD MSc [facility]    Division of Surgical Oncology  [facility]

## 2017-06-26 NOTE — NURSING NOTE
"Ulysses Purcell's goals for this visit include: 2 weeks post op  He requests these members of his care team be copied on today's visit information: no    PCP: Juan Kenney    Referring Provider:  No referring provider defined for this encounter.    Chief Complaint   Patient presents with     Surgical Followup     2 weeks post op       Initial There were no vitals taken for this visit. Estimated body mass index is 22.59 kg/(m^2) as calculated from the following:    Height as of 6/15/17: 1.803 m (5' 11\").    Weight as of 6/15/17: 73.5 kg (162 lb).  Medication Reconciliation: complete    Do you need any medication refills at today's visit? No    Heidy Moreno LPN      "

## 2017-06-26 NOTE — LETTER
2017      RE: Ulysses Purcell  [address]  [place] [address]     2017    RE: Ulysses Purcell  (: 1950)    Dear Dr. Booker    Your patient was seen for evaluation in my office.  Please find a copy of my notes for your record and review.  If you have any further questions, please feel free to contact my office.   Thank you.    Sincerely,   Melba Goodrich MD MSc [facility]    ---         FOLLOW-UP  2017    Ulysses Purcell is a 66 year old male who returns for his first post-operative follow-up visit.    HPI:    He underwent a laparotomy on 2017 for a presumed 10 cm mesenteric node.  He is currently 2 week(s) post-op.  At the time of surgery, no 10 cm mass was found.  Some prominent (1-2 cm) mesenteric nodes were seen and biopsied. Final surgical pathology showed CLL.    Since the procedure, he has been doing well. He has minimal pain. He has been avoiding heavy lifting. His appetite has returned and he denies any nausea/vomiting, constipation or diarrhea. He denies any rectal bleeding.    I ordered a repeat CT abdomen/pelvis today to reevaluate and determine whether that 10 cm mass was actually some unopacified bowel.    /74  Pulse 68   Physical Exam   Constitutional: He is well-developed, well-nourished, and in no distress.   Pulmonary/Chest: No respiratory distress.   Abdominal:   Well healed midline incision without hernia.   Skin: Skin is warm and dry.       INVESTIGATIONS:    CT abdomen/pelvis (2017)  FINDINGS:  Lung bases are clear.  2 right-sided adrenal nodules are stable in size when compared to 2013, and therefore [place] benign. Largest measures 2.3 cm on series 3 image 58, with a smaller lesion measuring 2.2 cm on series 3 image 48. Spleen is enlarged, measuring approximately 15.1 cm, increased from . Liver demonstrates small hypodensities which are too small to fully characterize on this exam as well as a small cyst in the left hepatic lobe. The  "gallbladder at is unremarkable. Small right renal hypodensities are too small to further characterize but likely benign. Small hypodensity in the pancreatic tail measuring 5 mm is likely a small pancreatic cyst.    Bladder densities mild diffuse wall thickening. Prostatic calcifications. Small free fluid in the pelvis. Sigmoid colon is decompressed. Small bowel has normal caliber.  Ill-defined fluid or soft tissue is seen within the mesentery, perhaps best seen on series 3 image 78 near the transverse colon. There are prominent noam hepatis soft tissue densities likely representing lymph nodes measuring up to 1.2 cm in short axis liver (series 3 image 43. Portacaval lymph node on series 3 image 53 measures 1.2 cm. Upper retroperitoneal lymphadenopathy is ill-defined but increased over normal expectations.  Degenerative changes in the spine. No aggressive bone lesion.  IMPRESSION:  1. Ill-defined soft tissue in the mesentery with upper abdominal adenopathy. Comparison is limited as no priors are available since a 2013 PET/CT. Appearance is compatible with provided history of CLL.  2. Splenomegaly.  3.Benign right adrenal nodules are stable since 2013.       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    We proceeded with a laparotomy for an excisional biopsy of a 10 cm mass presumed to be transformation to lymphoma. At the time of laparotomy, no mass was seen. On repeat CT abdomen/pelvis today, again no mass was seen.  It is likely that the \"mass\" initially seen was intervening unopacified bowel (unusual given PO contrast seen the in stomach and then in the distal bowel).  The fluid seen " is likely related to postoperative changes.     He has healed well from his laparotomy incision. I cautioned that he should continue to avoid heavy lifting for a total of 4-6 weeks after surgery.    This is an usual situation but Ulysses Purcell and his wife are relieved that there was no 10 cm mass. He will follow up with Dr Booker.  I have not made specific follow-up appointments with me at this time and will defer ongoing care to his PCP.  He knows to call if he has concerns.     All of the above was discussed with the patient and all questions were answered.     PLAN:  1.  Follow up with Dr Booker  2. Defer ongoing care to Dr Booker and to his PCP.    Melba Goodrich MD MSc [facility]    Division of Surgical Oncology  [facility]

## 2017-06-26 NOTE — LETTER
2017      RE: Ulysses Purcell  [address]  [place] [address]     2017    Juan Kenney, DO, DO   [facility] [place] [address]  [place] [address]    RE: Ulysses Purcell  (: 1950)    Dear Dr. Juan Kenney:    Your patient was seen for evaluation in my office.  Please find a copy of my notes for your record and review.  If you have any further questions, please feel free to contact my office.   Thank you for your kind referral.    Sincerely,   Melba Goodrich MD MSc [facility]    ---     FOLLOW-UP  2017    Ulysses Purcell is a 66 year old male who returns for his first post-operative follow-up visit.    HPI:    He underwent a laparotomy on 2017 for a presumed 10 cm mesenteric node.  He is currently 2 week(s) post-op.  At the time of surgery, no 10 cm mass was found.  Some prominent (1-2 cm) mesenteric nodes were seen and biopsied. Final surgical pathology showed CLL.    Since the procedure, he has been doing well. He has minimal pain. He has been avoiding heavy lifting. His appetite has returned and he denies any nausea/vomiting, constipation or diarrhea. He denies any rectal bleeding.    I ordered a repeat CT abdomen/pelvis today to reevaluate and determine whether that 10 cm mass was actually some unopacified bowel.    /74  Pulse 68   Physical Exam   Constitutional: He is well-developed, well-nourished, and in no distress.   Pulmonary/Chest: No respiratory distress.   Abdominal:   Well healed midline incision without hernia.   Skin: Skin is warm and dry.       INVESTIGATIONS:    CT abdomen/pelvis (2017)  FINDINGS:  Lung bases are clear.  2 right-sided adrenal nodules are stable in size when compared to 2013, and therefore [place] benign. Largest measures 2.3 cm on series 3 image 58, with a smaller lesion measuring 2.2 cm on series 3 image 48. Spleen is enlarged, measuring approximately 15.1 cm, increased from . Liver demonstrates small  "hypodensities which are too small to fully characterize on this exam as well as a small cyst in the left hepatic lobe. The gallbladder at is unremarkable. Small right renal hypodensities are too small to further characterize but likely benign. Small hypodensity in the pancreatic tail measuring 5 mm is likely a small pancreatic cyst.    Bladder densities mild diffuse wall thickening. Prostatic calcifications. Small free fluid in the pelvis. Sigmoid colon is decompressed. Small bowel has normal caliber.  Ill-defined fluid or soft tissue is seen within the mesentery, perhaps best seen on series 3 image 78 near the transverse colon. There are prominent noam hepatis soft tissue densities likely representing lymph nodes measuring up to 1.2 cm in short axis liver (series 3 image 43. Portacaval lymph node on series 3 image 53 measures 1.2 cm. Upper retroperitoneal lymphadenopathy is ill-defined but increased over normal expectations.  Degenerative changes in the spine. No aggressive bone lesion.  IMPRESSION:  1. Ill-defined soft tissue in the mesentery with upper abdominal adenopathy. Comparison is limited as no priors are available since a 2013 PET/CT. Appearance is compatible with provided history of CLL.  2. Splenomegaly.  3.Benign right adrenal nodules are stable since 2013.       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    We proceeded with a laparotomy for an excisional biopsy of a 10 cm mass presumed to be transformation to lymphoma. At the time of laparotomy, no mass was seen. On repeat CT abdomen/pelvis today, again no mass was seen.  It is likely that the \"mass\" initially seen was " intervening unopacified bowel (unusual given PO contrast seen the in stomach and then in the distal bowel).  The fluid seen is likely related to postoperative changes.     He has healed well from his laparotomy incision. I cautioned that he should continue to avoid heavy lifting for a total of 4-6 weeks after surgery.    This is an usual situation but Ulysses Purcell and his wife are relieved that there was no 10 cm mass. He will follow up with Dr oBoker.  I have not made specific follow-up appointments with me at this time and will defer ongoing care to his PCP.  He knows to call if he has concerns.     All of the above was discussed with the patient and all questions were answered.     PLAN:  1.  Follow up with Dr Booker  2. Defer ongoing care to Dr Booker and to his PCP.    Melba Goodrich MD MSc [facility]    Division of Surgical Oncology  [facility]

## 2017-07-10 ENCOUNTER — TELEPHONE (OUTPATIENT)
Dept: ONCOLOGY | Facility: OTHER | Age: 67
End: 2017-07-10

## 2017-07-10 DIAGNOSIS — C91.10 CLL (CHRONIC LYMPHOCYTIC LEUKEMIA) (H): Primary | ICD-10-CM

## 2017-07-10 NOTE — TELEPHONE ENCOUNTER
Patient called for quarterly appointment with Louisa Ga NP.Per Dr.Provatas MENARD to schedule at end of July with CBC,LDH,CMP.Pt to be called with appointment.    Zoey Carreon

## 2017-08-02 ENCOUNTER — TELEPHONE (OUTPATIENT)
Dept: ONCOLOGY | Facility: OTHER | Age: 67
End: 2017-08-02

## 2017-08-02 NOTE — TELEPHONE ENCOUNTER
Please call patient and let him know I talked with Dr. Booker and I will see him for regular follow up with his labwork as planned. Please schedule.

## 2017-08-03 NOTE — TELEPHONE ENCOUNTER
Notified patient. [facility] to schedule appointment.  Myriam Quinn LPN

## 2017-08-04 ENCOUNTER — ONCOLOGY VISIT (OUTPATIENT)
Dept: ONCOLOGY | Facility: OTHER | Age: 67
End: 2017-08-04
Attending: NURSE PRACTITIONER
Payer: MEDICARE

## 2017-08-04 ENCOUNTER — DOCUMENTATION ONLY (OUTPATIENT)
Dept: ONCOLOGY | Facility: OTHER | Age: 67
End: 2017-08-04

## 2017-08-04 ENCOUNTER — TELEPHONE (OUTPATIENT)
Dept: INFUSION THERAPY | Facility: OTHER | Age: 67
End: 2017-08-04

## 2017-08-04 VITALS
TEMPERATURE: 97.4 F | SYSTOLIC BLOOD PRESSURE: 137 MMHG | DIASTOLIC BLOOD PRESSURE: 88 MMHG | HEIGHT: 72 IN | OXYGEN SATURATION: 99 % | HEART RATE: 64 BPM | WEIGHT: 164.4 LBS | BODY MASS INDEX: 22.27 KG/M2

## 2017-08-04 DIAGNOSIS — C91.10 CLL (CHRONIC LYMPHOCYTIC LEUKEMIA) (H): Primary | ICD-10-CM

## 2017-08-04 DIAGNOSIS — C91.10 CLL (CHRONIC LYMPHOCYTIC LEUKEMIA) (H): ICD-10-CM

## 2017-08-04 LAB
ALBUMIN SERPL-MCNC: 4.3 G/DL (ref 3.4–5)
ALP SERPL-CCNC: 57 U/L (ref 40–150)
ALT SERPL W P-5'-P-CCNC: 32 U/L (ref 0–70)
ANION GAP SERPL CALCULATED.3IONS-SCNC: 6 MMOL/L (ref 3–14)
AST SERPL W P-5'-P-CCNC: 28 U/L (ref 0–45)
BASOPHILS # BLD AUTO: 0 10E9/L (ref 0–0.2)
BASOPHILS NFR BLD AUTO: 0 %
BILIRUB SERPL-MCNC: 0.7 MG/DL (ref 0.2–1.3)
BUN SERPL-MCNC: 16 MG/DL (ref 7–30)
CALCIUM SERPL-MCNC: 8.5 MG/DL (ref 8.5–10.1)
CHLORIDE SERPL-SCNC: 105 MMOL/L (ref 94–109)
CO2 SERPL-SCNC: 28 MMOL/L (ref 20–32)
CREAT SERPL-MCNC: 0.91 MG/DL (ref 0.66–1.25)
DIFFERENTIAL METHOD BLD: ABNORMAL
EOSINOPHIL # BLD AUTO: 0 10E9/L (ref 0–0.7)
EOSINOPHIL NFR BLD AUTO: 0 %
ERYTHROCYTE [DISTWIDTH] IN BLOOD BY AUTOMATED COUNT: 13.7 % (ref 10–15)
GFR SERPL CREATININE-BSD FRML MDRD: 83 ML/MIN/1.7M2
GLUCOSE SERPL-MCNC: 95 MG/DL (ref 70–99)
HCT VFR BLD AUTO: 41 % (ref 40–53)
HGB BLD-MCNC: 13.8 G/DL (ref 13.3–17.7)
LDH SERPL L TO P-CCNC: 303 U/L (ref 85–227)
LYMPHOCYTES # BLD AUTO: 58.1 10E9/L (ref 0.8–5.3)
LYMPHOCYTES NFR BLD AUTO: 87 %
MCH RBC QN AUTO: 30.3 PG (ref 26.5–33)
MCHC RBC AUTO-ENTMCNC: 33.7 G/DL (ref 31.5–36.5)
MCV RBC AUTO: 90 FL (ref 78–100)
MONOCYTES # BLD AUTO: 2.7 10E9/L (ref 0–1.3)
MONOCYTES NFR BLD AUTO: 4 %
NEUTROPHILS # BLD AUTO: 6 10E9/L (ref 1.6–8.3)
NEUTROPHILS NFR BLD AUTO: 9 %
PLATELET # BLD AUTO: 122 10E9/L (ref 150–450)
POTASSIUM SERPL-SCNC: 4.8 MMOL/L (ref 3.4–5.3)
PROT SERPL-MCNC: 7.1 G/DL (ref 6.8–8.8)
RBC # BLD AUTO: 4.55 10E12/L (ref 4.4–5.9)
SODIUM SERPL-SCNC: 139 MMOL/L (ref 133–144)
VARIANT LYMPHS BLD QL SMEAR: ABNORMAL
WBC # BLD AUTO: 66.8 10E9/L (ref 4–11)

## 2017-08-04 PROCEDURE — 99213 OFFICE O/P EST LOW 20 MIN: CPT | Performed by: NURSE PRACTITIONER

## 2017-08-04 PROCEDURE — 83615 LACTATE (LD) (LDH) ENZYME: CPT | Mod: ZL | Performed by: INTERNAL MEDICINE

## 2017-08-04 PROCEDURE — 99212 OFFICE O/P EST SF 10 MIN: CPT

## 2017-08-04 PROCEDURE — 85025 COMPLETE CBC W/AUTO DIFF WBC: CPT | Mod: ZL | Performed by: INTERNAL MEDICINE

## 2017-08-04 PROCEDURE — 80053 COMPREHEN METABOLIC PANEL: CPT | Mod: ZL | Performed by: INTERNAL MEDICINE

## 2017-08-04 PROCEDURE — 36415 COLL VENOUS BLD VENIPUNCTURE: CPT | Mod: ZL | Performed by: INTERNAL MEDICINE

## 2017-08-04 ASSESSMENT — PAIN SCALES - GENERAL: PAINLEVEL: NO PAIN (0)

## 2017-08-04 NOTE — PROGRESS NOTES
Per conner hernandes    To call [facility] and have [facility] send our CT scan to [facility], and have their radiologist addend to the one they did in June.   [facility] will send scan to [facility], and request their scan to be sent here.     Spoke to Maribeth in x ray [phone] she will have their radiologist compare and addend.     Yuliana Bentley RN

## 2017-08-04 NOTE — PATIENT INSTRUCTIONS
We would like to see you back in 3 months. Please come 30minute(s) prior for lab work.  When you are in need of a refill of your medications, please call your pharmacy and they will send us the request. If you have any questions please call [phone]

## 2017-08-04 NOTE — NURSING NOTE
Chief Complaint   Patient presents with     RECHECK     *_* Health Care Directive *_*     On file       Initial /88 (BP Location: Right arm, Cuff Size: Adult Regular)  Pulse 64  Temp 97.4  F (36.3  C) (Tympanic)  Ht 1.829 m (6')  Wt 74.6 kg (164 lb 6.4 oz)  SpO2 99%  BMI 22.3 kg/m2 Estimated body mass index is 22.3 kg/(m^2) as calculated from the following:    Height as of this encounter: 1.829 m (6').    Weight as of this encounter: 74.6 kg (164 lb 6.4 oz).  Medication Reconciliation: complete   Myriam Quinn LPN

## 2017-08-04 NOTE — PROGRESS NOTES
Oncology Follow-up Visit:  August 4, 2017    Reason for Visit:  Patient presents with:  RECHECK  *_* Health Care Directive *_*: On file     Nursing Note and documentation reviewed: yes    HPI:  This is a 66-year-old male patient who presents to the oncology clinic today in routine follow-up of CLL.  Extensive workup was completed in August of 2013 with initial consult. He has been followed with surveillance.      In May 2017, patient's WBC and lymphocytes increased and imaging was completed.  CT scan of the abdomen and pelvis showed a mass and he was sent to the [facility] for evaluation.  He underwent surgical exploration on 6/8/2017 by Dr. Goodrich and no mass was found.  Biopsies were completed of some prominent nodes with pathology consistent with his CLL diagnoses.  He followed up with Dr. Goodrich on 6/28/17 and repeat CT scan was done which showed no mass.    Patient presents to the clinic today stating he is feeling well.  He denies any issues with fevers, night sweats, changes in appetite or weight loss.  States his energy is excellent.  He has recovered well from the surgical procedure that was completed in June 2017 at the [facility].  Denies any new lumps or bumps.  He has no c/o chest pains, shortness of breath, coughing, problems with urination or swelling; he has no bleeding.    Oncologic History:  Damien was diagnosed with CLL in August 2013 after his routine physical with Dr. Kenney showed an elevation of his white blood cell count on his CBC. CBC at that time revealed WBC of 18.9 with 69.6% lymphocytes, H&H was 14.8 and 42.3, platelet count was 158,000. Peripheral blood smear showed lymphocytosis consistent with chronic lymphocytic leukemia/small lymphocytic lymphoma and mild thrombocytopenia. Peripheral blood flow for cytometry showed an abnormal B cell population consistent with B-cell CLL. The patient was CD5-positive, CD10 negative as well as CD23 positive with 47% B cells expressed  at C38 above levels of controls, 80% B cells had levels of cytoplasmic 70 comparable to T lymphocytes and assessment was sent for cytogenetics and FISH. Serum protein electrophoresis was negative. The FISH study was not able to be completed at that time.   He was then referred to Dr. Shantanu Stbubs at the [facility] who saw the patient on 10/16/2013. In review of patient's studies, he felt the patient had stage II CLL as he did have mild splenomegaly on his PET scan. He had concerns about flow cytometry findings as it pointed to a more aggressive disease and the FISH testing was repeated and showed predominance of trisomy 12 which was consistent with intermediate prognosis. It was felt the major determinant of prognosis in CLL would be the behavior of the disease over the course of the next several months to a year and in subsequent followups patient has been essentially asymptomatic. Dr. Tidwell recommended CBCs every 3 months along with physical exams for nodes and organomegaly and recommended a CT scan yearly.       Current Chemo Regime/TX: n/a  Current Cycle: n/a  # of completed cycles: n/a      Previous treatment: n/a    Past Medical History:   Diagnosis Date     Chronic lymphocytic leukemia (CLL), T-cell (H)     white counts in the 34,000 to 40,000      Elevated blood pressure reading without diagnosis of hypertension 10/16/2002     Fracture of metatarsal bone(s), closed 10/29/2001    right 5th metatarsal bone     Hypertension      Orthopedic aftercare NEC 11/08/2001     Pericarditis     2005     Routine general medical examination at [facility] 12/01/2000     Special screening for malignant neoplasms, colon 02/04/2004       Past Surgical History:   Procedure Laterality Date     BACK SURGERY  1997    L4-L5 partial laminectomy     BIOPSY  06/08/2017    abdomen     COLONOSCOPY  02/04/2004    No polyps     COLONOSCOPY  8/4/2014    Procedure: COLONOSCOPY;  Surgeon: Meliza Morales MD;  Location:  [facility]     HC LEFT HEART CATHETERIZATION  05/10/2005    Normal angiogram     LAMINECTOMY CERIVCAL POSTERIOR ONE LEVEL  1997     LAPAROSCOPY DIAGNOSTIC (GENERAL) N/A 6/8/2017    Procedure: LAPAROSCOPY DIAGNOSTIC (GENERAL);  Diagnostic Laparoscopy, Laparatomy Open resection of Mesenteric Lymph Node ;  Surgeon: Melba Goodrich MD;  Location:  OR       Family History   Problem Relation Age of Onset     HEART DISEASE Mother      stents the 2nd time, CABG     C.A.D. Mother      CEREBROVASCULAR DISEASE Father 58     HEART DISEASE Father      Unknown/Adopted Maternal Grandmother      Unknown/Adopted Maternal Grandfather      Unknown/Adopted Paternal Grandmother      Unknown/Adopted Paternal Grandfather      Hypertension Brother      Hypertension Brother      CANCER No family hx of      skin cancer       Social History     Social History     Marital status:      Spouse name: N/A     Number of children: N/A     Years of education: N/A     Occupational History     Not on file.     Social History Main Topics     Smoking status: Never Smoker     Smokeless tobacco: Never Used     Alcohol use Yes      Comment: red wine nightly     Drug use: No     Sexual activity: Yes     Partners: Female     Other Topics Concern     Not on file     Social History Narrative       Current Outpatient Prescriptions   Medication     senna-docusate (SENOKOT-S;PERICOLACE) 8.6-50 MG per tablet     lisinopril (PRINIVIL,ZESTRIL) 30 MG tablet     multivitamin (OCUVITE) TABS     MAGNESIUM OXIDE PO     aspirin 81 MG tablet     MULTIPLE VITAMIN PO     calcium-vitamin D (CALCIUM 600/VITAMIN D) 600-400 MG-UNIT per tablet     fish oil-omega-3 fatty acids (FISH OIL) 1000 MG capsule     Garlic 1000 MG CAPS     Coenzyme Q10 (COQ10) 100 MG CAPS     Glucosamine-Chondroitin (GLUCOSAMINE CHONDR COMPLEX PO)     Ferrous Sulfate (IRON) 325 (65 FE) MG tablet     B Complex-Biotin-FA (MULTI-B COMPLEX PO)     oxyCODONE (ROXICODONE) 5 MG IR tablet     No current  facility-administered medications for this visit.         No Known Allergies    Physical Exam:  /88 (BP Location: Right arm, Cuff Size: Adult Regular)  Pulse 64  Temp 97.4  F (36.3  C) (Tympanic)  Ht 1.829 m (6')  Wt 74.6 kg (164 lb 6.4 oz)  SpO2 99%  BMI 22.3 kg/m2    GENERAL APPEARANCE: Healthy, alert and in no acute distress.  HEENT: Normocephalic, Sclerae anicteric. Oropharynx without ulcers, lesions, or thrush.  NECK:  No asymmetry or masses, no thyromegaly.  LYMPHATICS: No palpable cervical, supraclavicular, axillary, or inguinal nodes   RESP: Lungs clear to auscultation bilaterally, respirations regular and easy  CARDIOVASCULAR: Regular rate and rhythm. Normal S1, S2; no murmur, gallop, or rub.  ABDOMEN: Soft, nontender. Bowel sounds auscultated all 4 quadrants. No palpable organomegaly or masses.  MUSCULOSKELETAL: Extremities without gross deformities noted. No edema of bilateral lower extremities.  SKIN: No suspicious lesions or rashes.  NEURO: Alert and oriented x 3.  Gait steady.  PSYCHIATRIC: Mentation and affect appear normal.  Mood appropriate.    Laboratory:  Results for orders placed or performed in visit on 08/04/17   CBC with platelets differential   Result Value Ref Range    WBC 66.8 (HH) 4.0 - 11.0 10e9/L    RBC Count 4.55 4.4 - 5.9 10e12/L    Hemoglobin 13.8 13.3 - 17.7 g/dL    Hematocrit 41.0 40.0 - 53.0 %    MCV 90 78 - 100 fl    MCH 30.3 26.5 - 33.0 pg    MCHC 33.7 31.5 - 36.5 g/dL    RDW 13.7 10.0 - 15.0 %    Platelet Count 122 (L) 150 - 450 10e9/L    Diff Method Manual Differential     % Neutrophils 9.0 %    % Lymphocytes 87.0 %    % Monocytes 4.0 %    % Eosinophils 0.0 %    % Basophils 0.0 %    Absolute Neutrophil 6.0 1.6 - 8.3 10e9/L    Absolute Lymphocytes 58.1 (H) 0.8 - 5.3 10e9/L    Absolute Monocytes 2.7 (H) 0.0 - 1.3 10e9/L    Absolute Eosinophils 0.0 0.0 - 0.7 10e9/L    Absolute Basophils 0.0 0.0 - 0.2 10e9/L    Reactive Lymphs P    Comprehensive metabolic panel   Result  Value Ref Range    Sodium 139 133 - 144 mmol/L    Potassium 4.8 3.4 - 5.3 mmol/L    Chloride 105 94 - 109 mmol/L    Carbon Dioxide 28 20 - 32 mmol/L    Anion Gap 6 3 - 14 mmol/L    Glucose 95 70 - 99 mg/dL    Urea Nitrogen 16 7 - 30 mg/dL    Creatinine 0.91 0.66 - 1.25 mg/dL    GFR Estimate 83 >60 mL/min/1.7m2    GFR Estimate If Black >90   GFR Calc   >60 mL/min/1.7m2    Calcium 8.5 8.5 - 10.1 mg/dL    Bilirubin Total 0.7 0.2 - 1.3 mg/dL    Albumin 4.3 3.4 - 5.0 g/dL    Protein Total 7.1 6.8 - 8.8 g/dL    Alkaline Phosphatase 57 40 - 150 U/L    ALT 32 0 - 70 U/L    AST 28 0 - 45 U/L   Lactate Dehydrogenase   Result Value Ref Range    Lactate Dehydrogenase 303 (H) 85 - 227 U/L       Imaging Studies:  None for today      ASSESSMENT/PLAN:    #1 CLL: Stage II chronic lymphocytic leukemia.  WBC, lymphocytes and LDH show stability.  He is asymptomatic.  He will follow up in 3 months with a CBC, CMP, LDH.    I encouraged patient to call with any questions or concerns.      Nicole Ga  [facility]

## 2017-08-04 NOTE — MR AVS SNAPSHOT
After Visit Summary   8/4/2017    Ulysses Purcell    MRN: [id number]           Patient Information     Date Of Birth          1950        Visit Information        Provider Department      8/4/2017 11:30 AM Nicole Ga NP [facility]        Care Instructions    We would like to see you back in 3 months. Please come 30minute(s) prior for lab work.  When you are in need of a refill of your medications, please call your pharmacy and they will send us the request. If you have any questions please call [phone]            Follow-ups after your visit        Your next 10 appointments already scheduled     Oct 16, 2017  9:20 AM CDT   (Arrive by 9:00 AM)   PHYSICAL with Juan Kenney,    [facility] [place] ([facility] [place] )    [address]  [place] [address]   [phone]              Who to contact     If you have questions or need follow up information about today's clinic visit or your schedule please contact [facility] directly at [phone].  Normal or non-critical lab and imaging results will be communicated to you by MyChart, letter or phone within 4 business days after the clinic has received the results. If you do not hear from us within 7 days, please contact the clinic through MyChart or phone. If you have a critical or abnormal lab result, we will notify you by phone as soon as possible.  Submit refill requests through [employer] or call your pharmacy and they will forward the refill request to us. Please allow 3 business days for your refill to be completed.          Additional Information About Your Visit        MyChart Information     [employer] gives you secure access to your electronic health record. If you see a primary care provider, you can also send messages to your care team and make appointments. If you have questions, please call your primary care clinic.  If you do not have a primary care provider,  please call [phone] and they will assist you.        Care EveryWhere ID     This is your Care EveryWhere ID. This could be used by other organizations to access your [facility] medical records  [id number]        Your Vitals Were     Pulse Temperature Height Pulse Oximetry BMI (Body Mass Index)       64 97.4  F (36.3  C) (Tympanic) 1.829 m (6') 99% 22.3 kg/m2        Blood Pressure from Last 3 Encounters:   08/04/17 137/88   06/26/17 162/74   06/15/17 130/90    Weight from Last 3 Encounters:   08/04/17 74.6 kg (164 lb 6.4 oz)   06/15/17 73.5 kg (162 lb)   06/08/17 74.4 kg (164 lb 0.4 oz)              Today, you had the following     No orders found for display       Primary Care Provider Office Phone # Fax #    Juan Keith Pablito,  [phone]       [facility] [place] [address]  [place] [address]        Equal Access to Services     : Hadii aad ku hadasho Soomaali, waaxda luqadaha, qaybta kaalmada adeegyada, waxay dakota hayyaritza jones . So [facility] [phone].    ATENCIÓN: Si habla español, tiene a zamora disposición servicios gratuitos de asistencia lingüística. Llame al [phone].    We comply with applicable federal civil rights laws and [place] laws. We do not discriminate on the basis of race, color, national origin, age, disability sex, sexual orientation or gender identity.            Thank you!     Thank you for choosing [facility] [place]  for your care. Our goal is always to provide you with excellent care. Hearing back from our patients is one way we can continue to improve our services. Please take a few minutes to complete the written survey that you may receive in the mail after your visit with us. Thank you!             Your Updated Medication List - Protect others around you: Learn how to safely use, store and throw away your medicines at www.disposemymeds.org.          This list is accurate as of: 8/4/17 12:03 PM.  Always use your  most recent med list.                   Brand Name Dispense Instructions for use Diagnosis    aspirin 81 MG tablet      Take  by mouth daily.        calcium 600/vitamin D 600-400 MG-UNIT per tablet   Generic drug:  calcium-vitamin D      Take 1 tablet by mouth daily.        CoQ10 100 MG Caps      Take  by mouth daily.        fish oil-omega-3 fatty acids 1000 MG capsule      Take 1 capsule by mouth daily.        Garlic 1000 MG Caps      Take  by mouth daily.        GLUCOSAMINE CHONDR COMPLEX PO      Take  by mouth daily.        iron 325 (65 FE) MG tablet      Take 1 tablet by mouth daily.        lisinopril 30 MG tablet    PRINIVIL,ZESTRIL    90 tablet    TAKE 1 TABLET DAILY BY MOUT H    Essential hypertension       MAGNESIUM OXIDE PO      Take 400 mg by mouth    CLL (chronic lymphocytic leukemia) (H)       MULTI-B COMPLEX PO      Take  by mouth daily.        MULTIPLE VITAMIN PO      Take  by mouth daily.        multivitamin Tabs tablet      Take 1 tablet by mouth daily        oxyCODONE 5 MG IR tablet    ROXICODONE    30 tablet    Take 1 tablet (5 mg) by mouth every 4 hours as needed for moderate to severe pain    Acute post-operative pain       senna-docusate 8.6-50 MG per tablet    SENOKOT-S;PERICOLACE    60 tablet    Take 1 tablet by mouth 2 times daily    Drug induced constipation

## 2017-08-15 ENCOUNTER — DOCUMENTATION ONLY (OUTPATIENT)
Dept: OTHER | Facility: CLINIC | Age: 67
End: 2017-08-15

## 2017-08-15 DIAGNOSIS — Z71.89 ACP (ADVANCE CARE PLANNING): Chronic | ICD-10-CM

## 2017-09-29 ENCOUNTER — DOCUMENTATION ONLY (OUTPATIENT)
Dept: OTHER | Facility: CLINIC | Age: 67
End: 2017-09-29

## 2017-10-10 DIAGNOSIS — I10 ESSENTIAL HYPERTENSION: ICD-10-CM

## 2017-10-10 NOTE — TELEPHONE ENCOUNTER
Lisionpril      Last Written Prescription Date: 1/11/2017  Last Fill Quantity: 90, # refills: 2  Last Office Visit with [facility], [facility] or [facility] prescribing provider: 6/15/2017  Next 5 appointments (look out 90 days)     Oct 16, 2017  9:20 AM CDT   (Arrive by 9:00 AM)   PHYSICAL with Juan Kenney,    [facility] ([facility]    [address] [place] [address]   [phone]                   Potassium   Date Value Ref Range Status   08/04/2017 4.8 3.4 - 5.3 mmol/L Final     Creatinine   Date Value Ref Range Status   08/04/2017 0.91 0.66 - 1.25 mg/dL Final     BP Readings from Last 3 Encounters:   08/04/17 137/88   06/26/17 162/74   06/15/17 130/90

## 2017-10-12 RX ORDER — LISINOPRIL 30 MG/1
TABLET ORAL
Qty: 90 TABLET | Refills: 2 | Status: SHIPPED | OUTPATIENT
Start: 2017-10-12 | End: 2018-07-08

## 2017-10-16 ENCOUNTER — OFFICE VISIT (OUTPATIENT)
Dept: PEDIATRICS | Facility: OTHER | Age: 67
End: 2017-10-16
Attending: INTERNAL MEDICINE
Payer: COMMERCIAL

## 2017-10-16 VITALS
SYSTOLIC BLOOD PRESSURE: 140 MMHG | HEIGHT: 71 IN | DIASTOLIC BLOOD PRESSURE: 80 MMHG | WEIGHT: 165 LBS | HEART RATE: 61 BPM | BODY MASS INDEX: 23.1 KG/M2 | TEMPERATURE: 96 F | OXYGEN SATURATION: 99 %

## 2017-10-16 DIAGNOSIS — C91.10 CLL (CHRONIC LYMPHOCYTIC LEUKEMIA) (H): ICD-10-CM

## 2017-10-16 DIAGNOSIS — Z00.00 ROUTINE GENERAL MEDICAL EXAMINATION AT A HEALTH CARE FACILITY: ICD-10-CM

## 2017-10-16 DIAGNOSIS — Z12.5 SCREENING FOR PROSTATE CANCER: ICD-10-CM

## 2017-10-16 DIAGNOSIS — Z23 NEED FOR PROPHYLACTIC VACCINATION AND INOCULATION AGAINST INFLUENZA: Primary | ICD-10-CM

## 2017-10-16 DIAGNOSIS — I10 ESSENTIAL HYPERTENSION: ICD-10-CM

## 2017-10-16 PROCEDURE — 90471 IMMUNIZATION ADMIN: CPT | Performed by: INTERNAL MEDICINE

## 2017-10-16 PROCEDURE — 90662 IIV NO PRSV INCREASED AG IM: CPT | Performed by: INTERNAL MEDICINE

## 2017-10-16 PROCEDURE — 99397 PER PM REEVAL EST PAT 65+ YR: CPT | Performed by: INTERNAL MEDICINE

## 2017-10-16 ASSESSMENT — ANXIETY QUESTIONNAIRES
1. FEELING NERVOUS, ANXIOUS, OR ON EDGE: NOT AT ALL
2. NOT BEING ABLE TO STOP OR CONTROL WORRYING: NOT AT ALL
7. FEELING AFRAID AS IF SOMETHING AWFUL MIGHT HAPPEN: NOT AT ALL
5. BEING SO RESTLESS THAT IT IS HARD TO SIT STILL: NOT AT ALL
6. BECOMING EASILY ANNOYED OR IRRITABLE: NOT AT ALL
3. WORRYING TOO MUCH ABOUT DIFFERENT THINGS: NOT AT ALL
IF YOU CHECKED OFF ANY PROBLEMS ON THIS QUESTIONNAIRE, HOW DIFFICULT HAVE THESE PROBLEMS MADE IT FOR YOU TO DO YOUR WORK, TAKE CARE OF THINGS AT HOME, OR GET ALONG WITH OTHER PEOPLE: NOT DIFFICULT AT ALL
GAD7 TOTAL SCORE: 0

## 2017-10-16 ASSESSMENT — PATIENT HEALTH QUESTIONNAIRE - PHQ9
SUM OF ALL RESPONSES TO PHQ QUESTIONS 1-9: 0
5. POOR APPETITE OR OVEREATING: NOT AT ALL

## 2017-10-16 ASSESSMENT — PAIN SCALES - GENERAL: PAINLEVEL: NO PAIN (0)

## 2017-10-16 NOTE — PROGRESS NOTES
Injectable Influenza Immunization Documentation    1.  Is the person to be vaccinated sick today?   No    2. Does the person to be vaccinated have an allergy to a component   of the vaccine?   No    3. Has the person to be vaccinated ever had a serious reaction   to influenza vaccine in the past?   No    4. Has the person to be vaccinated ever had Guillain-Barré syndrome?   No    Form completed by ISRAEL CARSON                Injectable Influenza Immunization Documentation    1.  Is the person to be vaccinated sick today?   No    2. Does the person to be vaccinated have an allergy to a component   of the vaccine?   No    3. Has the person to be vaccinated ever had a serious reaction   to influenza vaccine in the past?   No    4. Has the person to be vaccinated ever had Guillain-Barré syndrome?   No    Form completed by ISRAEL CARSON           Answers for HPI/ROS submitted by the patient on 10/13/2017   Annual Exam:  Getting at least 3 servings of Calcium per day:: Yes  Bi-annual eye exam:: Yes  Dental care twice a year:: Yes  Sleep apnea or symptoms of sleep apnea:: None  Diet:: Regular (no restrictions)  Frequency of exercise:: 2-3 days/week  Taking medications regularly:: Yes  Medication side effects:: None  Additional concerns today:: No  PHQ-2 Score: 0  Duration of exercise:: 15-30 minutes

## 2017-10-16 NOTE — PROGRESS NOTES
SUBJECTIVE:   CC: Ulysses Purcell is an 67 year old male who presents for preventative health visit.     Healthy Habits:    Do you get at least three servings of calcium containing foods daily (dairy, green leafy vegetables, etc.)? yes    Amount of exercise or daily activities, outside of work: 3 day(s) per week    Problems taking medications regularly No    Medication side effects: No    Have you had an eye exam in the past two years? Yes  08/2017    Do you see a dentist twice per year? yes    Do you have sleep apnea, excessive snoring or daytime drowsiness?no          Hypertension Follow-up      Outpatient blood pressures are being checked at home.  Results are 130s/80    Low Salt Diet: no added salt          Today's PHQ-2 Score: PHQ-2 ( 1999 Pf[employer]) 10/13/2017 5/12/2017   Q1: Little interest or pleasure in doing things 0 0   Q2: Feeling down, depressed or hopeless 0 0   PHQ-2 Score 0 0   Q1: Little interest or pleasure in doing things Not at all -   Q2: Feeling down, depressed or hopeless Not at all -   PHQ-2 Score 0 -         Abuse: Current or Past(Physical, Sexual or Emotional)- No  Do you feel safe in your environment - Yes  Social History   Substance Use Topics     Smoking status: Never Smoker     Smokeless tobacco: Never Used     Alcohol use Yes      Comment: red wine nightly     The patient does not drink >3 drinks per day nor >7 drinks per week.    Last PSA:   PSA   Date Value Ref Range Status   07/15/2015 0.30 0 - 4 ug/L Final       Reviewed orders with patient. Reviewed health maintenance and updated orders accordingly - Yes  BP Readings from Last 3 Encounters:   10/16/17 140/80   08/04/17 137/88   06/26/17 162/74    Wt Readings from Last 3 Encounters:   10/16/17 165 lb (74.8 kg)   08/04/17 164 lb 6.4 oz (74.6 kg)   06/15/17 162 lb (73.5 kg)                      Reviewed and updated as needed this visit by clinical staffTobacco  Allergies  Meds         Reviewed and updated as needed this visit by  Provider        Past Medical History:   Diagnosis Date     Chronic lymphocytic leukemia (CLL), T-cell (H)     white counts in the 34,000 to 40,000      Elevated blood pressure reading without diagnosis of hypertension 10/16/2002     Fracture of metatarsal bone(s), closed 10/29/2001    right 5th metatarsal bone     Hypertension      Other orthopedic aftercare(V54.89) 11/08/2001     Pericarditis     2005     Routine general medical examination at [facility] 12/01/2000     Special screening for malignant neoplasms, colon 02/04/2004      Past Surgical History:   Procedure Laterality Date     BACK SURGERY  1997    L4-L5 partial laminectomy     BIOPSY  06/08/2017    abdomen     COLONOSCOPY  02/04/2004    No polyps     COLONOSCOPY  8/4/2014    Procedure: COLONOSCOPY;  Surgeon: Meliza Morales MD;  Location: [facility] OR     HC LEFT HEART CATHETERIZATION  05/10/2005    Normal angiogram     LAMINECTOMY CERIVCAL POSTERIOR ONE LEVEL  1997     LAPAROSCOPY DIAGNOSTIC (GENERAL) N/A 6/8/2017    Procedure: LAPAROSCOPY DIAGNOSTIC (GENERAL);  Diagnostic Laparoscopy, Laparatomy Open resection of Mesenteric Lymph Node ;  Surgeon: Melba Goodrich MD;  Location: [facility] OR       ROS:  C: NEGATIVE for fever, chills, change in weight  I: NEGATIVE for worrisome rashes, moles or lesions  E: NEGATIVE for vision changes or irritation  ENT: NEGATIVE for ear, mouth and throat problems  R: NEGATIVE for significant cough or SOB  CV: NEGATIVE for chest pain, palpitations or peripheral edema  GI: NEGATIVE for nausea, abdominal pain, heartburn, or change in bowel habits   male: negative for dysuria, hematuria, decreased urinary stream, erectile dysfunction, urethral discharge  M: NEGATIVE for significant arthralgias or myalgia  N: NEGATIVE for weakness, dizziness or paresthesias  E: NEGATIVE for temperature intolerance, skin/hair changes  P: NEGATIVE for changes in mood or affect    OBJECTIVE:   /80 (BP Location: Right arm, Patient Position:  "Chair, Cuff Size: Adult Regular)  Pulse 61  Temp 96  F (35.6  C) (Tympanic)  Ht 5' 10.5\" (1.791 m)  Wt 165 lb (74.8 kg)  SpO2 99%  BMI 23.34 kg/m2  EXAM:  GENERAL: healthy, alert and no distress  EYES: Eyes grossly normal to inspection, PERRL and conjunctivae and sclerae normal  HENT: ear canals and TM's normal, nose and mouth without ulcers or lesions  NECK: no adenopathy, no asymmetry, masses, or scars and thyroid normal to palpation  NECK: no carotid bruits  RESP: lungs clear to auscultation - no rales, rhonchi or wheezes  CV: regular rate and rhythm, normal S1 S2, no S3 or S4, no murmur, click or rub, no peripheral edema and peripheral pulses strong  ABDOMEN: soft, nontender, no hepatosplenomegaly, no masses and bowel sounds normal  ABDOMEN: no palpable or pulsatile masses and no bruits heard   (male): normal male genitalia without lesions or urethral discharge, no hernia  RECTAL (male): normal sphincter tone, no rectal masses, prostate normal size, smooth, nontender without nodules or masses  MS: no gross musculoskeletal defects noted, no edema  SKIN: multiple seborrheic keratosis over the trunk and back.  NEURO: Normal strength and tone, mentation intact and speech normal  PSYCH: mentation appears normal, affect normal/bright  LYMPH: anterior cervical: enlarged lymph nodes in the anterior cervical area.  posterior cervical: no adenopathy  supraclavicular: no adenopathy  axillary: no adenopathy        Labs:  Results for orders placed or performed in visit on 08/04/17   CBC with platelets differential   Result Value Ref Range    WBC 66.8 (HH) 4.0 - 11.0 10e9/L    RBC Count 4.55 4.4 - 5.9 10e12/L    Hemoglobin 13.8 13.3 - 17.7 g/dL    Hematocrit 41.0 40.0 - 53.0 %    MCV 90 78 - 100 fl    MCH 30.3 26.5 - 33.0 pg    MCHC 33.7 31.5 - 36.5 g/dL    RDW 13.7 10.0 - 15.0 %    Platelet Count 122 (L) 150 - 450 10e9/L    Diff Method Manual Differential     % Neutrophils 9.0 %    % Lymphocytes 87.0 %    % Monocytes " 4.0 %    % Eosinophils 0.0 %    % Basophils 0.0 %    Absolute Neutrophil 6.0 1.6 - 8.3 10e9/L    Absolute Lymphocytes 58.1 (H) 0.8 - 5.3 10e9/L    Absolute Monocytes 2.7 (H) 0.0 - 1.3 10e9/L    Absolute Eosinophils 0.0 0.0 - 0.7 10e9/L    Absolute Basophils 0.0 0.0 - 0.2 10e9/L    Reactive Lymphs P    Comprehensive metabolic panel   Result Value Ref Range    Sodium 139 133 - 144 mmol/L    Potassium 4.8 3.4 - 5.3 mmol/L    Chloride 105 94 - 109 mmol/L    Carbon Dioxide 28 20 - 32 mmol/L    Anion Gap 6 3 - 14 mmol/L    Glucose 95 70 - 99 mg/dL    Urea Nitrogen 16 7 - 30 mg/dL    Creatinine 0.91 0.66 - 1.25 mg/dL    GFR Estimate 83 >60 mL/min/1.7m2    GFR Estimate If Black >90   GFR Calc   >60 mL/min/1.7m2    Calcium 8.5 8.5 - 10.1 mg/dL    Bilirubin Total 0.7 0.2 - 1.3 mg/dL    Albumin 4.3 3.4 - 5.0 g/dL    Protein Total 7.1 6.8 - 8.8 g/dL    Alkaline Phosphatase 57 40 - 150 U/L    ALT 32 0 - 70 U/L    AST 28 0 - 45 U/L   Lactate Dehydrogenase   Result Value Ref Range    Lactate Dehydrogenase 303 (H) 85 - 227 U/L       PSA   Date Value Ref Range Status   07/15/2015 0.30 0 - 4 ug/L Final         ASSESSMENT/PLAN:   (Z00.00) Routine general medical examination at a health care facility  Comment: He is up to date on his immunizations and colon cancer screening.  He is behind on prostate cancer screening.  Plan:   He will have a repeat colonoscopy in 2 years      (I10) Essential hypertension  Comment: At goal  Plan:   He will continue lisinopril 10 mg daily.    (C91.10) CLL (chronic lymphocytic leukemia) (H)  Comment: His white count numbers have been stable.  Plan:   He will continue monitoring by oncology.    (Z12.5) Screening for prostate cancer  Comment:   Plan:   PSA, screen, pending for next lab draw.    (Z23) Need for prophylactic vaccination and inoculation against influenza  (primary encounter diagnosis)  Comment:   Plan: Vaccine Administration, Initial [21805], FLU         VACCINE, INCREASED  "ANTIGEN, PRESV FREE, AGE 65+        [55670]          COUNSELING:  Reviewed preventive health counseling, as reflected in patient instructions       Regular exercise       Immunizations    Vaccinated for: Influenza           Aspirin Prophylaxsis       Colon cancer screening       Prostate cancer screening               reports that he has never smoked. He has never used smokeless tobacco.      Estimated body mass index is 23.34 kg/(m^2) as calculated from the following:    Height as of this encounter: 5' 10.5\" (1.791 m).    Weight as of this encounter: 165 lb (74.8 kg).         Counseling Resources:  ATP IV Guidelines  Pooled Cohorts Equation Calculator  FRAX Risk Assessment  ICSI Preventive Guidelines  Dietary Guidelines for Americans, 2010  Why Not Give Back's MyPlate  ASA Prophylaxis  Lung CA Screening    Juan Kenney DO,   [facility]3] [place] for HPI/ROS submitted by the patient on 10/13/2017   Annual Exam:  Getting at least 3 servings of Calcium per day:: Yes  Bi-annual eye exam:: Yes  Dental care twice a year:: Yes  Sleep apnea or symptoms of sleep apnea:: None  Diet:: Regular (no restrictions)  Frequency of exercise:: 2-3 days/week  Taking medications regularly:: Yes  Medication side effects:: None  Additional concerns today:: No  PHQ-2 Score: 0  Duration of exercise:: 15-30 minutes    "

## 2017-10-16 NOTE — NURSING NOTE
"Chief Complaint   Patient presents with     Physical     Flu Shot       Initial /80 (BP Location: Right arm, Patient Position: Chair, Cuff Size: Adult Regular)  Pulse 61  Temp 96  F (35.6  C) (Tympanic)  Ht 5' 10.5\" (1.791 m)  Wt 165 lb (74.8 kg)  SpO2 99%  BMI 23.34 kg/m2 Estimated body mass index is 23.34 kg/(m^2) as calculated from the following:    Height as of this encounter: 5' 10.5\" (1.791 m).    Weight as of this encounter: 165 lb (74.8 kg).  Medication Reconciliation: complete   ISRAEL CARSON      "

## 2017-10-16 NOTE — MR AVS SNAPSHOT
After Visit Summary   10/16/2017    Ulysses Purcell    MRN: [id number]           Patient Information     Date Of Birth          1950        Visit Information        Provider Department      10/16/2017 9:20 AM Juan Kenney, DO [facility]        Today's Diagnoses     Need for prophylactic vaccination and inoculation against influenza    -  1    Essential hypertension        CLL (chronic lymphocytic leukemia) (H)        Routine general medical examination at a health care facility        Screening for prostate cancer          Care Instructions      Preventive Health Recommendations:   Male Ages 65 and over    Yearly exam:             See your health care provider every year in order to  o   Review health changes.   o   Discuss preventive care.    o   Review your medicines if your doctor has prescribed any.    Talk with your health care provider about whether you should have a test to screen for prostate cancer (PSA).    Every 3 years, have a diabetes test (fasting glucose). If you are at risk for diabetes, you should have this test more often.    Every 5 years, have a cholesterol test. Have this test more often if you are at risk for high cholesterol or heart disease.     Every 10 years, have a colonoscopy. Or, have a yearly FIT test (stool test). These exams will check for colon cancer.    Talk to with your health care provider about screening for Abdominal Aortic Aneurysm if you have a family history of AAA or have a history of smoking.    Shots:     Get a flu shot each year.     Get a tetanus shot every 10 years.     Talk to your doctor about your pneumonia vaccines. There are now two you should receive - Pneumovax (PPSV 23) and Prevnar (PCV 13).     Talk to your doctor about a shingles vaccine.     Talk to your doctor about the hepatitis B vaccine.  Nutrition:     Eat at least 5 servings of fruits and vegetables each day.     Eat whole-grain bread, whole-wheat pasta and  brown rice instead of white grains and rice.     Talk to your provider about Calcium and Vitamin D.   Lifestyle    Exercise for at least 150 minutes a week (30 minutes a day, 5 days a week). This will help you control your weight and prevent disease.     Limit alcohol to one drink per day.     No smoking.     Wear sunscreen to prevent skin cancer.     See your dentist every six months for an exam and cleaning.     See your eye doctor every 1 to 2 years to screen for conditions such as glaucoma, macular degeneration, cataracts, etc           Follow-ups after your visit        Who to contact     If you have questions or need follow up information about today's clinic visit or your schedule please contact [facility] [place] directly at [phone].  Normal or non-critical lab and imaging results will be communicated to you by MyChart, letter or phone within 4 business days after the clinic has received the results. If you do not hear from us within 7 days, please contact the clinic through [employer] or phone. If you have a critical or abnormal lab result, we will notify you by phone as soon as possible.  Submit refill requests through [employer] or call your pharmacy and they will forward the refill request to us. Please allow 3 business days for your refill to be completed.          Additional Information About Your Visit        [employer] Information     [employer] gives you secure access to your electronic health record. If you see a primary care provider, you can also send messages to your care team and make appointments. If you have questions, please call your primary care clinic.  If you do not have a primary care provider, please call [phone] and they will assist you.        Care EveryWhere ID     This is your Care EveryWhere ID. This could be used by other organizations to access your [place] medical records  [id number]        Your Vitals Were     Pulse Temperature Height Pulse Oximetry BMI (Body Mass  "Index)       61 96  F (35.6  C) (Tympanic) 5' 10.5\" (1.791 m) 99% 23.34 kg/m2        Blood Pressure from Last 3 Encounters:   10/16/17 140/80   08/04/17 137/88   06/26/17 162/74    Weight from Last 3 Encounters:   10/16/17 165 lb (74.8 kg)   08/04/17 164 lb 6.4 oz (74.6 kg)   06/15/17 162 lb (73.5 kg)              We Performed the Following     FLU VACCINE, INCREASED ANTIGEN, PRESV FREE, AGE 65+ [82295]     Vaccine Administration, Initial [53797]        Primary Care Provider Office Phone # Fax #    Juan Keith Kenney,  [phone]       [facility] [place] [address]  [place] [address]        Equal Access to Services     ANNA LARA : Hadii aad ku hadasho Soomaali, waaxda luqadaha, qaybta kaalmada adeegyada, howard nuñezin hayyaritza jones . So [facility] [phone].    ATENCIÓN: Si habla español, tiene a zamora disposición servicios gratuitos de asistencia lingüística. Llame al [phone].    We comply with applicable federal civil rights laws and [place] laws. We do not discriminate on the basis of race, color, national origin, age, disability, sex, sexual orientation, or gender identity.            Thank you!     Thank you for choosing [facility]  for your [facility]. Our goal is always to provide you with excellent care. Hearing back from our patients is one way we can continue to improve our services. Please take a few minutes to complete the written survey that you may receive in the mail after your visit with us. Thank you!             Your Updated Medication List - Protect others around you: Learn how to safely use, store and throw away your medicines at www.disposemymeds.org.          This list is accurate as of: 10/16/17 11:59 PM.  Always use your most recent med list.                   Brand Name Dispense Instructions for use Diagnosis    aspirin 81 MG tablet      Take  by mouth daily.        calcium 600/vitamin D 600-400 MG-UNIT per tablet   Generic drug:  " calcium-vitamin D      Take 1 tablet by mouth daily.        CoQ10 100 MG Caps      Take  by mouth daily.        fish oil-omega-3 fatty acids 1000 MG capsule      Take 1 capsule by mouth daily.        Garlic 1000 MG Caps      Take  by mouth daily.        GLUCOSAMINE CHONDR COMPLEX PO      Take  by mouth daily.        iron 325 (65 FE) MG tablet      Take 1 tablet by mouth daily.        lisinopril 30 MG tablet    PRINIVIL,ZESTRIL    90 tablet    TAKE 1 TABLET DAILY BY MOUTH    Essential hypertension       MAGNESIUM OXIDE PO      Take 400 mg by mouth    CLL (chronic lymphocytic leukemia) (H)       MULTI-B COMPLEX PO      Take  by mouth daily.        MULTIPLE VITAMIN PO      Take  by mouth daily.        multivitamin Tabs tablet      Take 1 tablet by mouth daily

## 2017-10-17 ASSESSMENT — ANXIETY QUESTIONNAIRES: GAD7 TOTAL SCORE: 0

## 2017-11-26 ENCOUNTER — HEALTH MAINTENANCE LETTER (OUTPATIENT)
Age: 67
End: 2017-11-26

## 2017-11-29 ENCOUNTER — TELEPHONE (OUTPATIENT)
Dept: PEDIATRICS | Facility: OTHER | Age: 67
End: 2017-11-29

## 2017-11-29 ENCOUNTER — ONCOLOGY VISIT (OUTPATIENT)
Dept: ONCOLOGY | Facility: OTHER | Age: 67
End: 2017-11-29
Attending: NURSE PRACTITIONER
Payer: MEDICARE

## 2017-11-29 VITALS
OXYGEN SATURATION: 100 % | TEMPERATURE: 96.2 F | RESPIRATION RATE: 16 BRPM | SYSTOLIC BLOOD PRESSURE: 155 MMHG | DIASTOLIC BLOOD PRESSURE: 88 MMHG | HEART RATE: 63 BPM | BODY MASS INDEX: 23.66 KG/M2 | WEIGHT: 169 LBS | HEIGHT: 71 IN

## 2017-11-29 DIAGNOSIS — C91.10 CLL (CHRONIC LYMPHOCYTIC LEUKEMIA) (H): ICD-10-CM

## 2017-11-29 DIAGNOSIS — Z12.5 SCREENING FOR PROSTATE CANCER: ICD-10-CM

## 2017-11-29 DIAGNOSIS — C91.10 CLL (CHRONIC LYMPHOCYTIC LEUKEMIA) (H): Primary | ICD-10-CM

## 2017-11-29 LAB
ALBUMIN SERPL-MCNC: 4.3 G/DL (ref 3.4–5)
ALP SERPL-CCNC: 71 U/L (ref 40–150)
ALT SERPL W P-5'-P-CCNC: 39 U/L (ref 0–70)
ANION GAP SERPL CALCULATED.3IONS-SCNC: 5 MMOL/L (ref 3–14)
AST SERPL W P-5'-P-CCNC: 30 U/L (ref 0–45)
BASOPHILS # BLD AUTO: 0 10E9/L (ref 0–0.2)
BASOPHILS NFR BLD AUTO: 0 %
BILIRUB SERPL-MCNC: 0.9 MG/DL (ref 0.2–1.3)
BUN SERPL-MCNC: 19 MG/DL (ref 7–30)
CALCIUM SERPL-MCNC: 8.4 MG/DL (ref 8.5–10.1)
CHLORIDE SERPL-SCNC: 105 MMOL/L (ref 94–109)
CO2 SERPL-SCNC: 29 MMOL/L (ref 20–32)
CREAT SERPL-MCNC: 0.95 MG/DL (ref 0.66–1.25)
DIFFERENTIAL METHOD BLD: ABNORMAL
EOSINOPHIL # BLD AUTO: 0.9 10E9/L (ref 0–0.7)
EOSINOPHIL NFR BLD AUTO: 1 %
ERYTHROCYTE [DISTWIDTH] IN BLOOD BY AUTOMATED COUNT: 13.7 % (ref 10–15)
GFR SERPL CREATININE-BSD FRML MDRD: 79 ML/MIN/1.7M2
GLUCOSE SERPL-MCNC: 99 MG/DL (ref 70–99)
HCT VFR BLD AUTO: 40.9 % (ref 40–53)
HGB BLD-MCNC: 13.8 G/DL (ref 13.3–17.7)
LDH SERPL L TO P-CCNC: 319 U/L (ref 85–227)
LYMPHOCYTES # BLD AUTO: 78.1 10E9/L (ref 0.8–5.3)
LYMPHOCYTES NFR BLD AUTO: 84 %
MCH RBC QN AUTO: 30.6 PG (ref 26.5–33)
MCHC RBC AUTO-ENTMCNC: 33.7 G/DL (ref 31.5–36.5)
MCV RBC AUTO: 91 FL (ref 78–100)
MONOCYTES # BLD AUTO: 1.9 10E9/L (ref 0–1.3)
MONOCYTES NFR BLD AUTO: 2 %
NEUTROPHILS # BLD AUTO: 12.1 10E9/L (ref 1.6–8.3)
NEUTROPHILS NFR BLD AUTO: 13 %
PLATELET # BLD AUTO: 129 10E9/L (ref 150–450)
POTASSIUM SERPL-SCNC: 4.7 MMOL/L (ref 3.4–5.3)
PROT SERPL-MCNC: 7.2 G/DL (ref 6.8–8.8)
PSA SERPL-ACNC: 0.26 UG/L (ref 0–4)
RBC # BLD AUTO: 4.51 10E12/L (ref 4.4–5.9)
SODIUM SERPL-SCNC: 139 MMOL/L (ref 133–144)
WBC # BLD AUTO: 93 10E9/L (ref 4–11)

## 2017-11-29 PROCEDURE — 83615 LACTATE (LD) (LDH) ENZYME: CPT | Mod: ZL | Performed by: INTERNAL MEDICINE

## 2017-11-29 PROCEDURE — G0103 PSA SCREENING: HCPCS | Mod: ZL | Performed by: INTERNAL MEDICINE

## 2017-11-29 PROCEDURE — 99213 OFFICE O/P EST LOW 20 MIN: CPT | Performed by: NURSE PRACTITIONER

## 2017-11-29 PROCEDURE — 80053 COMPREHEN METABOLIC PANEL: CPT | Mod: ZL | Performed by: INTERNAL MEDICINE

## 2017-11-29 PROCEDURE — 36415 COLL VENOUS BLD VENIPUNCTURE: CPT | Mod: ZL | Performed by: INTERNAL MEDICINE

## 2017-11-29 PROCEDURE — 99212 OFFICE O/P EST SF 10 MIN: CPT

## 2017-11-29 PROCEDURE — 85025 COMPLETE CBC W/AUTO DIFF WBC: CPT | Mod: ZL | Performed by: INTERNAL MEDICINE

## 2017-11-29 ASSESSMENT — PAIN SCALES - GENERAL: PAINLEVEL: NO PAIN (0)

## 2017-11-29 ASSESSMENT — PATIENT HEALTH QUESTIONNAIRE - PHQ9: SUM OF ALL RESPONSES TO PHQ QUESTIONS 1-9: 0

## 2017-11-29 NOTE — PROGRESS NOTES
Oncology Follow-up Visit:  November 28, 2017    Reason for Visit:  Patient presents with:  RECHECK: 3 month follow up CLL     Nursing Note and documentation reviewed: yes    HPI: This is a 66-year-old male patient who presents to the oncology clinic today in routine follow-up of CLL.  Extensive workup was completed in August of 2013 with initial consult. He has been followed with surveillance.     In May 2017, patient's WBC and lymphocytes increased and imaging was completed.  CT scan of the abdomen and pelvis showed a mass and he was sent to the [facility] for evaluation.  He underwent surgical exploration on 6/8/2017 by Dr. Goodrich and no mass was found.  Biopsies were completed of some prominent nodes with pathology consistent with his CLL diagnoses.  He followed up with Dr. Goodrich on 6/28/17 and repeat CT scan was done which showed no mass.     Patient presents to the clinic today stating he is feeling well.  He denies any issues with new lumps or bumps, denies fevers, night sweats, chills, weight loss or change in appetite.  Denies any shortness of breath, cough, abdominal pain or early satiety.    Oncologic History:   Damien was diagnosed with CLL in August 2013 after his routine physical with Dr. Kenney showed an elevation of his white blood cell count on his CBC. CBC at that time revealed WBC of 18.9 with 69.6% lymphocytes, H&H was 14.8 and 42.3, platelet count was 158,000. Peripheral blood smear showed lymphocytosis consistent with chronic lymphocytic leukemia/small lymphocytic lymphoma and mild thrombocytopenia. Peripheral blood flow for cytometry showed an abnormal B cell population consistent with B-cell CLL. The patient was CD5-positive, CD10 negative as well as CD23 positive with 47% B cells expressed at C38 above levels of controls, 80% B cells had levels of cytoplasmic 70 comparable to T lymphocytes and assessment was sent for cytogenetics and FISH. Serum protein electrophoresis was negative. The  FISH study was not able to be completed at that time.   He was then referred to Dr. Shantanu Stubbs at the [facility] who saw the patient on 10/16/2013. In review of patient's studies, he felt the patient had stage II CLL as he did have mild splenomegaly on his PET scan. He had concerns about flow cytometry findings as it pointed to a more aggressive disease and the FISH testing was repeated and showed predominance of trisomy 12 which was consistent with intermediate prognosis. It was felt the major determinant of prognosis in CLL would be the behavior of the disease over the course of the next several months to a year and in subsequent followups patient has been essentially asymptomatic. Dr. Tidwell recommended CBCs every 3 months along with physical exams for nodes and organomegaly and recommended a CT scan yearly.       Current Chemo Regime/TX: n/a  Current Cycle: n/a  # of completed cycles: n/a      Previous treatment: n/a      Past Medical History:   Diagnosis Date     Chronic lymphocytic leukemia (CLL), T-cell (H)     white counts in the 34,000 to 40,000      Elevated blood pressure reading without diagnosis of hypertension 10/16/2002     Fracture of metatarsal bone(s), closed 10/29/2001    right 5th metatarsal bone     Hypertension      Other orthopedic aftercare(V54.89) 11/08/2001     Pericarditis     2005     Routine general medical examination at [facility] 12/01/2000     Special screening for malignant neoplasms, colon 02/04/2004     Tubular adenoma of colon 02/2014    single        Past Surgical History:   Procedure Laterality Date     BACK SURGERY  1997    L4-L5 partial laminectomy     BIOPSY  06/08/2017    abdomen     COLONOSCOPY  02/04/2004    No polyps     COLONOSCOPY  8/4/2014    Procedure: COLONOSCOPY;  Surgeon: Meliza Morales MD;  Location: [facility] OR     HC LEFT HEART CATHETERIZATION  05/10/2005    Normal angiogram     LAMINECTOMY CERIVCAL POSTERIOR ONE LEVEL  1997     LAPAROSCOPY  "DIAGNOSTIC (GENERAL) N/A 6/8/2017    Procedure: LAPAROSCOPY DIAGNOSTIC (GENERAL);  Diagnostic Laparoscopy, Laparatomy Open resection of Mesenteric Lymph Node ;  Surgeon: Melba Goodrich MD;  Location:  OR       Family History   Problem Relation Age of Onset     HEART DISEASE Mother      stents the 2nd time, CABG     C.A.D. Mother      CEREBROVASCULAR DISEASE Father 58     HEART DISEASE Father      Unknown/Adopted Maternal Grandmother      Unknown/Adopted Maternal Grandfather      Unknown/Adopted Paternal Grandmother      Unknown/Adopted Paternal Grandfather      Hypertension Brother      Hypertension Brother      CANCER No family hx of      skin cancer       Social History     Social History     Marital status:      Spouse name: N/A     Number of children: N/A     Years of education: N/A     Occupational History     Not on file.     Social History Main Topics     Smoking status: Never Smoker     Smokeless tobacco: Never Used     Alcohol use Yes      Comment: red wine nightly     Drug use: No     Sexual activity: Yes     Partners: Female     Other Topics Concern     Not on file     Social History Narrative       Current Outpatient Prescriptions   Medication     lisinopril (PRINIVIL,ZESTRIL) 30 MG tablet     multivitamin (OCUVITE) TABS     MAGNESIUM OXIDE PO     aspirin 81 MG tablet     calcium-vitamin D (CALCIUM 600/VITAMIN D) 600-400 MG-UNIT per tablet     fish oil-omega-3 fatty acids (FISH OIL) 1000 MG capsule     Garlic 1000 MG CAPS     Coenzyme Q10 (COQ10) 100 MG CAPS     Glucosamine-Chondroitin (GLUCOSAMINE CHONDR COMPLEX PO)     Ferrous Sulfate (IRON) 325 (65 FE) MG tablet     B Complex-Biotin-FA (MULTI-B COMPLEX PO)     No current facility-administered medications for this visit.         No Known Allergies    Physical Exam:  /88 (BP Location: Right arm, Cuff Size: Adult Regular)  Pulse 63  Temp 96.2  F (35.7  C) (Tympanic)  Resp 16  Ht 1.791 m (5' 10.5\")  Wt 76.7 kg (169 lb)  SpO2 " 100%  BMI 23.91 kg/m2    GENERAL APPEARANCE: Healthy, alert and in no acute distress.  HEENT: Normocephalic, Sclerae anicteric. Oropharynx without ulcers, lesions, or thrush.  NECK:  No asymmetry or masses, no thyromegaly.  LYMPHATICS: No palpable cervical, supraclavicular, axillary, or inguinal nodes   RESP: Lungs clear to auscultation bilaterally, respirations regular and easy  CARDIOVASCULAR: Regular rate and rhythm. Normal S1, S2; no murmur, gallop, or rub.  ABDOMEN: Soft, nontender. Bowel sounds auscultated all 4 quadrants. No palpable organomegaly or masses.  MUSCULOSKELETAL: Extremities without gross deformities noted. No edema of bilateral lower extremities.  SKIN: No suspicious lesions or rashes.  NEURO: Alert and oriented x 3.  Gait steady.  PSYCHIATRIC: Mentation and affect appear normal.  Mood appropriate.    Laboratory:  Results for orders placed or performed in visit on 11/29/17   PSA, screen   Result Value Ref Range    PSA 0.26 0 - 4 ug/L   CBC with platelets differential   Result Value Ref Range    WBC 93.0 (HH) 4.0 - 11.0 10e9/L    RBC Count 4.51 4.4 - 5.9 10e12/L    Hemoglobin 13.8 13.3 - 17.7 g/dL    Hematocrit 40.9 40.0 - 53.0 %    MCV 91 78 - 100 fl    MCH 30.6 26.5 - 33.0 pg    MCHC 33.7 31.5 - 36.5 g/dL    RDW 13.7 10.0 - 15.0 %    Platelet Count 129 (L) 150 - 450 10e9/L    Diff Method Manual Differential     % Neutrophils 13.0 %    % Lymphocytes 84.0 %    % Monocytes 2.0 %    % Eosinophils 1.0 %    % Basophils 0.0 %    Absolute Neutrophil 12.1 (H) 1.6 - 8.3 10e9/L    Absolute Lymphocytes 78.1 (H) 0.8 - 5.3 10e9/L    Absolute Monocytes 1.9 (H) 0.0 - 1.3 10e9/L    Absolute Eosinophils 0.9 (H) 0.0 - 0.7 10e9/L    Absolute Basophils 0.0 0.0 - 0.2 10e9/L   Comprehensive metabolic panel   Result Value Ref Range    Sodium 139 133 - 144 mmol/L    Potassium 4.7 3.4 - 5.3 mmol/L    Chloride 105 94 - 109 mmol/L    Carbon Dioxide 29 20 - 32 mmol/L    Anion Gap 5 3 - 14 mmol/L    Glucose 99 70 - 99 mg/dL     Urea Nitrogen 19 7 - 30 mg/dL    Creatinine 0.95 0.66 - 1.25 mg/dL    GFR Estimate 79 >60 mL/min/1.7m2    GFR Estimate If Black >90 >60 mL/min/1.7m2    Calcium 8.4 (L) 8.5 - 10.1 mg/dL    Bilirubin Total 0.9 0.2 - 1.3 mg/dL    Albumin 4.3 3.4 - 5.0 g/dL    Protein Total 7.2 6.8 - 8.8 g/dL    Alkaline Phosphatase 71 40 - 150 U/L    ALT 39 0 - 70 U/L    AST 30 0 - 45 U/L   Lactate Dehydrogenase   Result Value Ref Range    Lactate Dehydrogenase 319 (H) 85 - 227 U/L       Imaging Studies:  None for today's visit      ASSESSMENT/PLAN:    #1 CLL: Stage II chronic lymphocytic leukemia. Increasing WBC and lymphocytes.  Patient is completely asymptomatic.  I explained that I would like to discuss recommendations with Dr. Booker and I will call him with a plan for follow up.     I encouraged patient to call with any questions or concerns.       Nicole Ga  [facility]

## 2017-11-29 NOTE — NURSING NOTE
"Chief Complaint   Patient presents with     RECHECK     3 month follow up CLL       Initial /88 (BP Location: Right arm, Cuff Size: Adult Regular)  Pulse 63  Temp 96.2  F (35.7  C) (Tympanic)  Resp 16  Ht 1.791 m (5' 10.5\")  Wt 76.7 kg (169 lb)  SpO2 100%  BMI 23.91 kg/m2 Estimated body mass index is 23.91 kg/(m^2) as calculated from the following:    Height as of this encounter: 1.791 m (5' 10.5\").    Weight as of this encounter: 76.7 kg (169 lb).  Medication Reconciliation: malini [place]      "

## 2017-11-29 NOTE — PATIENT INSTRUCTIONS
We will call you with the plan for followup.  If you have any questions please call [phone]  Other instructions: none

## 2017-11-29 NOTE — TELEPHONE ENCOUNTER
CRITICAL LAB VALUE    DATE:  11/29/2017     TIME OF RECEIPT FROM LAB:  0844    LAB TEST:  WBC    LAB VALUE:  93    RESULTS GIVEN WITH READ-BACK TO (PROVIDER):  Dr. Kenney    TIME LAB VALUE REPORTED TO PROVIDER:   0845  Danni Stone LPN

## 2017-11-29 NOTE — MR AVS SNAPSHOT
"              After Visit Summary   11/29/2017    Ulysses Purcell    MRN: [id number]           Patient Information     Date Of Birth          1950        Visit Information        Provider Department      11/29/2017 3:00 PM Nicole Ga NP [facility]        Care Instructions    We will call you with the plan for followup.  If you have any questions please call [phone]  Other instructions: none          Follow-ups after your visit        Who to contact     If you have questions or need follow up information about today's clinic visit or your schedule please contact [facility] directly at [phone].  Normal or non-critical lab and imaging results will be communicated to you by MyChart, letter or phone within 4 business days after the clinic has received the results. If you do not hear from us within 7 days, please contact the clinic through [employer] or phone. If you have a critical or abnormal lab result, we will notify you by phone as soon as possible.  Submit refill requests through [employer] or call your pharmacy and they will forward the refill request to us. Please allow 3 business days for your refill to be completed.          Additional Information About Your Visit        MyChart Information     [employer] gives you secure access to your electronic health record. If you see a primary care provider, you can also send messages to your care team and make appointments. If you have questions, please call your primary care clinic.  If you do not have a primary care provider, please call [phone] and they will assist you.        Care EveryWhere ID     This is your Care EveryWhere ID. This could be used by other organizations to access your [place] medical records  [id number]        Your Vitals Were     Pulse Temperature Respirations Height Pulse Oximetry BMI (Body Mass Index)    63 96.2  F (35.7  C) (Tympanic) 16 1.791 m (5' 10.5\") 100% 23.91 kg/m2       Blood " Pressure from Last 3 Encounters:   11/29/17 155/88   10/16/17 140/80   08/04/17 137/88    Weight from Last 3 Encounters:   11/29/17 76.7 kg (169 lb)   10/16/17 74.8 kg (165 lb)   08/04/17 74.6 kg (164 lb 6.4 oz)              Today, you had the following     No orders found for display       Primary Care Provider Office Phone # Fax #    Juaneddie Kenney,  [phone]       [facility] [place] [address]  [place] [address]        Equal Access to Services     [facility] : Hadii aad ku hadasho Soomaali, waaxda luqadaha, qaybta kaalmada adeegyada, howard monteiro adeesme jones . So [facility] [phone].    ATENCIÓN: Si habla español, tiene a zamora disposición servicios gratuitos de asistencia lingüística. Llame al [phone].    We comply with applicable federal civil rights laws and [place] laws. We do not discriminate on the basis of race, color, national origin, age, disability, sex, sexual orientation, or gender identity.            Thank you!     Thank you for choosing [facility]  [facility]. Our goal is always to provide you with excellent care. Hearing back from our patients is one way we can continue to improve our services. Please take a few minutes to complete the written survey that you may receive in the mail after your visit with us. Thank you!             Your Updated Medication List - Protect others around you: Learn how to safely use, store and throw away your medicines at www.disposemymeds.org.          This list is accurate as of: 11/29/17  3:34 PM.  Always use your most recent med list.                   Brand Name Dispense Instructions for use Diagnosis    aspirin 81 MG tablet      Take  by mouth daily.        calcium 600/vitamin D 600-400 MG-UNIT per tablet   Generic drug:  calcium-vitamin D      Take 1 tablet by mouth daily.        CoQ10 100 MG Caps      Take  by mouth daily.        fish oil-omega-3 fatty acids 1000 MG capsule      Take  1 capsule by mouth daily.        Garlic 1000 MG Caps      Take  by mouth daily.        GLUCOSAMINE CHONDR COMPLEX PO      Take  by mouth daily.        iron 325 (65 FE) MG tablet      Take 1 tablet by mouth daily.        lisinopril 30 MG tablet    PRINIVIL,ZESTRIL    90 tablet    TAKE 1 TABLET DAILY BY MOUTH    Essential hypertension       MAGNESIUM OXIDE PO      Take 400 mg by mouth    CLL (chronic lymphocytic leukemia) (H)       MULTI-B COMPLEX PO      Take  by mouth daily.        multivitamin Tabs tablet      Take 1 tablet by mouth daily

## 2017-12-05 ENCOUNTER — TELEPHONE (OUTPATIENT)
Dept: INFUSION THERAPY | Facility: OTHER | Age: 67
End: 2017-12-05

## 2017-12-05 DIAGNOSIS — C91.10 CLL (CHRONIC LYMPHOCYTIC LEUKEMIA) (H): Primary | ICD-10-CM

## 2017-12-05 NOTE — TELEPHONE ENCOUNTER
Please call patient and let him know I talked with Dr. Booker and he doesn't recommend any scans-just watching.  He needs to followu in 3 months with Dr. Booker with labs same day.  He usually comes early for them.  I'll put the orders in.

## 2018-01-01 ENCOUNTER — SURGERY (OUTPATIENT)
Age: 68
End: 2018-01-01

## 2018-01-01 ENCOUNTER — TELEPHONE (OUTPATIENT)
Dept: ONCOLOGY | Facility: OTHER | Age: 68
End: 2018-01-01

## 2018-01-01 ENCOUNTER — ONCOLOGY VISIT (OUTPATIENT)
Dept: ONCOLOGY | Facility: OTHER | Age: 68
End: 2018-01-01
Attending: NURSE PRACTITIONER
Payer: MEDICARE

## 2018-01-01 ENCOUNTER — ANESTHESIA EVENT (OUTPATIENT)
Dept: SURGERY | Facility: HOSPITAL | Age: 68
End: 2018-01-01
Payer: MEDICARE

## 2018-01-01 ENCOUNTER — OFFICE VISIT (OUTPATIENT)
Dept: SURGERY | Facility: OTHER | Age: 68
End: 2018-01-01
Attending: SURGERY
Payer: COMMERCIAL

## 2018-01-01 ENCOUNTER — ALLIED HEALTH/NURSE VISIT (OUTPATIENT)
Dept: ALLERGY | Facility: OTHER | Age: 68
End: 2018-01-01
Attending: INTERNAL MEDICINE
Payer: MEDICARE

## 2018-01-01 ENCOUNTER — ANESTHESIA (OUTPATIENT)
Dept: SURGERY | Facility: HOSPITAL | Age: 68
End: 2018-01-01
Payer: MEDICARE

## 2018-01-01 ENCOUNTER — HOSPITAL ENCOUNTER (OUTPATIENT)
Facility: HOSPITAL | Age: 68
Discharge: HOME | End: 2018-08-13
Attending: SURGERY | Admitting: SURGERY
Payer: MEDICARE

## 2018-01-01 VITALS
SYSTOLIC BLOOD PRESSURE: 115 MMHG | HEIGHT: 71 IN | TEMPERATURE: 96.6 F | WEIGHT: 166 LBS | DIASTOLIC BLOOD PRESSURE: 88 MMHG | BODY MASS INDEX: 23.24 KG/M2 | OXYGEN SATURATION: 95 % | RESPIRATION RATE: 18 BRPM

## 2018-01-01 VITALS
HEART RATE: 78 BPM | RESPIRATION RATE: 18 BRPM | DIASTOLIC BLOOD PRESSURE: 80 MMHG | TEMPERATURE: 97.6 F | HEIGHT: 71 IN | SYSTOLIC BLOOD PRESSURE: 130 MMHG | OXYGEN SATURATION: 98 % | BODY MASS INDEX: 23.1 KG/M2 | WEIGHT: 165 LBS

## 2018-01-01 VITALS
WEIGHT: 166 LBS | OXYGEN SATURATION: 98 % | HEIGHT: 71 IN | SYSTOLIC BLOOD PRESSURE: 132 MMHG | HEART RATE: 68 BPM | TEMPERATURE: 98.1 F | RESPIRATION RATE: 16 BRPM | DIASTOLIC BLOOD PRESSURE: 68 MMHG | BODY MASS INDEX: 23.24 KG/M2

## 2018-01-01 DIAGNOSIS — C91.10 CLL (CHRONIC LYMPHOCYTIC LEUKEMIA) (H): Primary | ICD-10-CM

## 2018-01-01 DIAGNOSIS — Z23 NEED FOR VACCINATION: Primary | ICD-10-CM

## 2018-01-01 DIAGNOSIS — I10 ESSENTIAL HYPERTENSION: ICD-10-CM

## 2018-01-01 DIAGNOSIS — C91.10 CLL (CHRONIC LYMPHOCYTIC LEUKEMIA) (H): ICD-10-CM

## 2018-01-01 DIAGNOSIS — Z12.11 SPECIAL SCREENING FOR MALIGNANT NEOPLASMS, COLON: Primary | ICD-10-CM

## 2018-01-01 DIAGNOSIS — Z23 NEED FOR PROPHYLACTIC VACCINATION AND INOCULATION AGAINST INFLUENZA: ICD-10-CM

## 2018-01-01 LAB
ALBUMIN SERPL-MCNC: 4.4 G/DL (ref 3.4–5)
ALP SERPL-CCNC: 72 U/L (ref 40–150)
ALT SERPL W P-5'-P-CCNC: 36 U/L (ref 0–70)
ANION GAP SERPL CALCULATED.3IONS-SCNC: 4 MMOL/L (ref 3–14)
AST SERPL W P-5'-P-CCNC: 36 U/L (ref 0–45)
BILIRUB SERPL-MCNC: 0.8 MG/DL (ref 0.2–1.3)
BUN SERPL-MCNC: 15 MG/DL (ref 7–30)
CALCIUM SERPL-MCNC: 8.8 MG/DL (ref 8.5–10.1)
CHLORIDE SERPL-SCNC: 102 MMOL/L (ref 94–109)
CO2 SERPL-SCNC: 30 MMOL/L (ref 20–32)
CREAT SERPL-MCNC: 1.01 MG/DL (ref 0.66–1.25)
DIFFERENTIAL METHOD BLD: ABNORMAL
EOSINOPHIL # BLD AUTO: 2.7 10E9/L (ref 0–0.7)
EOSINOPHIL NFR BLD AUTO: 2 %
ERYTHROCYTE [DISTWIDTH] IN BLOOD BY AUTOMATED COUNT: 14.3 % (ref 10–15)
GFR SERPL CREATININE-BSD FRML MDRD: 73 ML/MIN/1.7M2
GLUCOSE SERPL-MCNC: 94 MG/DL (ref 70–99)
HCT VFR BLD AUTO: 39.8 % (ref 40–53)
HGB BLD-MCNC: 13.4 G/DL (ref 13.3–17.7)
LDH SERPL L TO P-CCNC: 435 U/L (ref 85–227)
LYMPHOCYTES # BLD AUTO: 123.2 10E9/L (ref 0.8–5.3)
LYMPHOCYTES NFR BLD AUTO: 92 %
MCH RBC QN AUTO: 30.3 PG (ref 26.5–33)
MCHC RBC AUTO-ENTMCNC: 33.7 G/DL (ref 31.5–36.5)
MCV RBC AUTO: 90 FL (ref 78–100)
MONOCYTES # BLD AUTO: 4 10E9/L (ref 0–1.3)
MONOCYTES NFR BLD AUTO: 3 %
NEUTROPHILS # BLD AUTO: 4 10E9/L (ref 1.6–8.3)
NEUTROPHILS NFR BLD AUTO: 3 %
PLATELET # BLD AUTO: 138 10E9/L (ref 150–450)
POTASSIUM SERPL-SCNC: 5.4 MMOL/L (ref 3.4–5.3)
PROT SERPL-MCNC: 7.3 G/DL (ref 6.8–8.8)
RBC # BLD AUTO: 4.42 10E12/L (ref 4.4–5.9)
SODIUM SERPL-SCNC: 136 MMOL/L (ref 133–144)
WBC # BLD AUTO: 133.9 10E9/L (ref 4–11)

## 2018-01-01 PROCEDURE — G0009 ADMIN PNEUMOCOCCAL VACCINE: HCPCS

## 2018-01-01 PROCEDURE — G0463 HOSPITAL OUTPT CLINIC VISIT: HCPCS

## 2018-01-01 PROCEDURE — 40000305 ZZH STATISTIC PRE PROC ASSESS I: Performed by: SURGERY

## 2018-01-01 PROCEDURE — 80053 COMPREHEN METABOLIC PANEL: CPT | Mod: ZL | Performed by: NURSE PRACTITIONER

## 2018-01-01 PROCEDURE — 36415 COLL VENOUS BLD VENIPUNCTURE: CPT | Mod: ZL | Performed by: NURSE PRACTITIONER

## 2018-01-01 PROCEDURE — 25000125 ZZHC RX 250: Performed by: NURSE ANESTHETIST, CERTIFIED REGISTERED

## 2018-01-01 PROCEDURE — 90662 IIV NO PRSV INCREASED AG IM: CPT | Performed by: NURSE PRACTITIONER

## 2018-01-01 PROCEDURE — G0008 ADMIN INFLUENZA VIRUS VAC: HCPCS | Performed by: NURSE PRACTITIONER

## 2018-01-01 PROCEDURE — 93005 ELECTROCARDIOGRAM TRACING: CPT

## 2018-01-01 PROCEDURE — 93010 ELECTROCARDIOGRAM REPORT: CPT | Performed by: INTERNAL MEDICINE

## 2018-01-01 PROCEDURE — 25000128 H RX IP 250 OP 636: Performed by: NURSE ANESTHETIST, CERTIFIED REGISTERED

## 2018-01-01 PROCEDURE — 45378 DIAGNOSTIC COLONOSCOPY: CPT | Performed by: SURGERY

## 2018-01-01 PROCEDURE — 85025 COMPLETE CBC W/AUTO DIFF WBC: CPT | Mod: ZL | Performed by: NURSE PRACTITIONER

## 2018-01-01 PROCEDURE — 36000050 ZZH SURGERY LEVEL 2 1ST 30 MIN: Performed by: SURGERY

## 2018-01-01 PROCEDURE — 90670 PCV13 VACCINE IM: CPT

## 2018-01-01 PROCEDURE — 99204 OFFICE O/P NEW MOD 45 MIN: CPT | Performed by: SURGERY

## 2018-01-01 PROCEDURE — 83615 LACTATE (LD) (LDH) ENZYME: CPT | Mod: ZL | Performed by: NURSE PRACTITIONER

## 2018-01-01 PROCEDURE — G0463 HOSPITAL OUTPT CLINIC VISIT: HCPCS | Mod: 25

## 2018-01-01 PROCEDURE — 45380 COLONOSCOPY AND BIOPSY: CPT | Performed by: NURSE ANESTHETIST, CERTIFIED REGISTERED

## 2018-01-01 PROCEDURE — 99213 OFFICE O/P EST LOW 20 MIN: CPT | Performed by: NURSE PRACTITIONER

## 2018-01-01 PROCEDURE — 37000008 ZZH ANESTHESIA TECHNICAL FEE, 1ST 30 MIN: Performed by: SURGERY

## 2018-01-01 PROCEDURE — 71000027 ZZH RECOVERY PHASE 2 EACH 15 MINS: Performed by: SURGERY

## 2018-01-01 RX ORDER — LIDOCAINE 40 MG/G
CREAM TOPICAL
Status: DISCONTINUED | OUTPATIENT
Start: 2018-01-01 | End: 2018-01-01 | Stop reason: HOSPADM

## 2018-01-01 RX ORDER — SODIUM CHLORIDE, SODIUM LACTATE, POTASSIUM CHLORIDE, CALCIUM CHLORIDE 600; 310; 30; 20 MG/100ML; MG/100ML; MG/100ML; MG/100ML
INJECTION, SOLUTION INTRAVENOUS CONTINUOUS
Status: DISCONTINUED | OUTPATIENT
Start: 2018-01-01 | End: 2018-01-01 | Stop reason: HOSPADM

## 2018-01-01 RX ORDER — ONDANSETRON 2 MG/ML
4 INJECTION INTRAMUSCULAR; INTRAVENOUS EVERY 30 MIN PRN
Status: DISCONTINUED | OUTPATIENT
Start: 2018-01-01 | End: 2018-01-01 | Stop reason: HOSPADM

## 2018-01-01 RX ORDER — NALOXONE HYDROCHLORIDE 0.4 MG/ML
.1-.4 INJECTION, SOLUTION INTRAMUSCULAR; INTRAVENOUS; SUBCUTANEOUS
Status: DISCONTINUED | OUTPATIENT
Start: 2018-01-01 | End: 2018-01-01 | Stop reason: HOSPADM

## 2018-01-01 RX ORDER — ONDANSETRON 4 MG/1
4 TABLET, ORALLY DISINTEGRATING ORAL EVERY 30 MIN PRN
Status: DISCONTINUED | OUTPATIENT
Start: 2018-01-01 | End: 2018-01-01 | Stop reason: HOSPADM

## 2018-01-01 RX ORDER — SODIUM CHLORIDE, SODIUM LACTATE, POTASSIUM CHLORIDE, CALCIUM CHLORIDE 600; 310; 30; 20 MG/100ML; MG/100ML; MG/100ML; MG/100ML
INJECTION, SOLUTION INTRAVENOUS CONTINUOUS PRN
Status: DISCONTINUED | OUTPATIENT
Start: 2018-01-01 | End: 2018-01-01

## 2018-01-01 RX ORDER — LIDOCAINE HYDROCHLORIDE 20 MG/ML
INJECTION, SOLUTION INFILTRATION; PERINEURAL PRN
Status: DISCONTINUED | OUTPATIENT
Start: 2018-01-01 | End: 2018-01-01

## 2018-01-01 RX ORDER — MEPERIDINE HYDROCHLORIDE 50 MG/ML
12.5 INJECTION INTRAMUSCULAR; INTRAVENOUS; SUBCUTANEOUS
Status: DISCONTINUED | OUTPATIENT
Start: 2018-01-01 | End: 2018-01-01 | Stop reason: HOSPADM

## 2018-01-01 RX ORDER — FLUMAZENIL 0.1 MG/ML
0.2 INJECTION, SOLUTION INTRAVENOUS
Status: DISCONTINUED | OUTPATIENT
Start: 2018-01-01 | End: 2018-01-01 | Stop reason: HOSPADM

## 2018-01-01 RX ORDER — PROPOFOL 10 MG/ML
INJECTION, EMULSION INTRAVENOUS PRN
Status: DISCONTINUED | OUTPATIENT
Start: 2018-01-01 | End: 2018-01-01

## 2018-01-01 RX ORDER — SODIUM, POTASSIUM,MAG SULFATES 17.5-3.13G
1 SOLUTION, RECONSTITUTED, ORAL ORAL SEE ADMIN INSTRUCTIONS
Qty: 2 BOTTLE | Refills: 0 | Status: ON HOLD | OUTPATIENT
Start: 2018-01-01 | End: 2018-01-01

## 2018-01-01 RX ADMIN — PROPOFOL 30 MG: 10 INJECTION, EMULSION INTRAVENOUS at 10:07

## 2018-01-01 RX ADMIN — PROPOFOL 30 MG: 10 INJECTION, EMULSION INTRAVENOUS at 10:02

## 2018-01-01 RX ADMIN — PROPOFOL 30 MG: 10 INJECTION, EMULSION INTRAVENOUS at 10:05

## 2018-01-01 RX ADMIN — PROPOFOL 10 MG: 10 INJECTION, EMULSION INTRAVENOUS at 10:14

## 2018-01-01 RX ADMIN — LIDOCAINE HYDROCHLORIDE 40 MG: 20 INJECTION, SOLUTION INFILTRATION; PERINEURAL at 09:59

## 2018-01-01 RX ADMIN — PROPOFOL 30 MG: 10 INJECTION, EMULSION INTRAVENOUS at 10:00

## 2018-01-01 RX ADMIN — PROPOFOL 10 MG: 10 INJECTION, EMULSION INTRAVENOUS at 10:17

## 2018-01-01 RX ADMIN — PROPOFOL 10 MG: 10 INJECTION, EMULSION INTRAVENOUS at 10:11

## 2018-01-01 RX ADMIN — PROPOFOL 30 MG: 10 INJECTION, EMULSION INTRAVENOUS at 10:03

## 2018-01-01 RX ADMIN — PROPOFOL 30 MG: 10 INJECTION, EMULSION INTRAVENOUS at 10:01

## 2018-01-01 RX ADMIN — PROPOFOL 30 MG: 10 INJECTION, EMULSION INTRAVENOUS at 10:09

## 2018-01-01 RX ADMIN — PROPOFOL 30 MG: 10 INJECTION, EMULSION INTRAVENOUS at 09:59

## 2018-01-01 RX ADMIN — SODIUM CHLORIDE, POTASSIUM CHLORIDE, SODIUM LACTATE AND CALCIUM CHLORIDE: 600; 310; 30; 20 INJECTION, SOLUTION INTRAVENOUS at 08:58

## 2018-01-01 ASSESSMENT — PAIN SCALES - GENERAL
PAINLEVEL: NO PAIN (0)
PAINLEVEL: NO PAIN (0)

## 2018-01-01 ASSESSMENT — PATIENT HEALTH QUESTIONNAIRE - PHQ9: SUM OF ALL RESPONSES TO PHQ QUESTIONS 1-9: 0

## 2018-02-19 ENCOUNTER — ONCOLOGY VISIT (OUTPATIENT)
Dept: ONCOLOGY | Facility: OTHER | Age: 68
End: 2018-02-19
Attending: INTERNAL MEDICINE
Payer: COMMERCIAL

## 2018-02-19 ENCOUNTER — TELEPHONE (OUTPATIENT)
Dept: ONCOLOGY | Facility: OTHER | Age: 68
End: 2018-02-19

## 2018-02-19 VITALS
OXYGEN SATURATION: 99 % | TEMPERATURE: 97.9 F | DIASTOLIC BLOOD PRESSURE: 82 MMHG | HEART RATE: 72 BPM | SYSTOLIC BLOOD PRESSURE: 121 MMHG | RESPIRATION RATE: 20 BRPM | BODY MASS INDEX: 23.49 KG/M2 | WEIGHT: 167.8 LBS | HEIGHT: 71 IN

## 2018-02-19 DIAGNOSIS — C91.10 CLL (CHRONIC LYMPHOCYTIC LEUKEMIA) (H): ICD-10-CM

## 2018-02-19 DIAGNOSIS — C91.10 CLL (CHRONIC LYMPHOCYTIC LEUKEMIA) (H): Primary | ICD-10-CM

## 2018-02-19 LAB
ALBUMIN SERPL-MCNC: 4.3 G/DL (ref 3.4–5)
ALP SERPL-CCNC: 67 U/L (ref 40–150)
ALT SERPL W P-5'-P-CCNC: 33 U/L (ref 0–70)
ANION GAP SERPL CALCULATED.3IONS-SCNC: 3 MMOL/L (ref 3–14)
AST SERPL W P-5'-P-CCNC: 29 U/L (ref 0–45)
BASOPHILS # BLD AUTO: 0 10E9/L (ref 0–0.2)
BASOPHILS NFR BLD AUTO: 0 %
BILIRUB SERPL-MCNC: 0.5 MG/DL (ref 0.2–1.3)
BUN SERPL-MCNC: 21 MG/DL (ref 7–30)
CALCIUM SERPL-MCNC: 8.5 MG/DL (ref 8.5–10.1)
CHLORIDE SERPL-SCNC: 105 MMOL/L (ref 94–109)
CO2 SERPL-SCNC: 30 MMOL/L (ref 20–32)
CREAT SERPL-MCNC: 0.99 MG/DL (ref 0.66–1.25)
DIFFERENTIAL METHOD BLD: ABNORMAL
EOSINOPHIL # BLD AUTO: 1 10E9/L (ref 0–0.7)
EOSINOPHIL NFR BLD AUTO: 1 %
ERYTHROCYTE [DISTWIDTH] IN BLOOD BY AUTOMATED COUNT: 13.7 % (ref 10–15)
GFR SERPL CREATININE-BSD FRML MDRD: 75 ML/MIN/1.7M2
GLUCOSE SERPL-MCNC: 93 MG/DL (ref 70–99)
HCT VFR BLD AUTO: 39.9 % (ref 40–53)
HGB BLD-MCNC: 13.6 G/DL (ref 13.3–17.7)
LDH SERPL L TO P-CCNC: 332 U/L (ref 85–227)
LYMPHOCYTES # BLD AUTO: 89.2 10E9/L (ref 0.8–5.3)
LYMPHOCYTES NFR BLD AUTO: 87 %
MCH RBC QN AUTO: 30.6 PG (ref 26.5–33)
MCHC RBC AUTO-ENTMCNC: 34.1 G/DL (ref 31.5–36.5)
MCV RBC AUTO: 90 FL (ref 78–100)
MONOCYTES # BLD AUTO: 0 10E9/L (ref 0–1.3)
MONOCYTES NFR BLD AUTO: 0 %
NEUTROPHILS # BLD AUTO: 12.3 10E9/L (ref 1.6–8.3)
NEUTROPHILS NFR BLD AUTO: 12 %
PLATELET # BLD AUTO: 161 10E9/L (ref 150–450)
POTASSIUM SERPL-SCNC: 5.3 MMOL/L (ref 3.4–5.3)
PROT SERPL-MCNC: 7.4 G/DL (ref 6.8–8.8)
RBC # BLD AUTO: 4.44 10E12/L (ref 4.4–5.9)
SODIUM SERPL-SCNC: 138 MMOL/L (ref 133–144)
VARIANT LYMPHS BLD QL SMEAR: PRESENT
WBC # BLD AUTO: 102.5 10E9/L (ref 4–11)

## 2018-02-19 PROCEDURE — 85025 COMPLETE CBC W/AUTO DIFF WBC: CPT | Mod: ZL | Performed by: NURSE PRACTITIONER

## 2018-02-19 PROCEDURE — 80053 COMPREHEN METABOLIC PANEL: CPT | Mod: ZL | Performed by: NURSE PRACTITIONER

## 2018-02-19 PROCEDURE — 36415 COLL VENOUS BLD VENIPUNCTURE: CPT | Mod: ZL | Performed by: NURSE PRACTITIONER

## 2018-02-19 PROCEDURE — 99214 OFFICE O/P EST MOD 30 MIN: CPT | Performed by: INTERNAL MEDICINE

## 2018-02-19 PROCEDURE — G0463 HOSPITAL OUTPT CLINIC VISIT: HCPCS

## 2018-02-19 PROCEDURE — 83615 LACTATE (LD) (LDH) ENZYME: CPT | Mod: ZL | Performed by: NURSE PRACTITIONER

## 2018-02-19 ASSESSMENT — PAIN SCALES - GENERAL: PAINLEVEL: NO PAIN (0)

## 2018-02-19 NOTE — NURSING NOTE
"Chief Complaint   Patient presents with     RECHECK     Follow up 3 months for chronic lymphocytic leukemia       Initial /82  Pulse 72  Temp 97.9  F (36.6  C) (Tympanic)  Resp 20  Ht 1.791 m (5' 10.5\")  Wt 76.1 kg (167 lb 12.8 oz)  SpO2 99%  BMI 23.74 kg/m2 Estimated body mass index is 23.74 kg/(m^2) as calculated from the following:    Height as of this encounter: 1.791 m (5' 10.5\").    Weight as of this encounter: 76.1 kg (167 lb 12.8 oz).  Medication Reconciliation: complete   Immunization reviewed and advanced directives on file, Pain = 0, PHQ9 = 0.  Patient was assessed using the NCCN psychosocial distress thermometer. Patient rated the score as a 0. Patient rated current stressors as none. Stressors will be brought to the attention of provider or Oncology RN Care Coordinator for a score of 6 or greater or per nurses discretion.   Annalise Calvert LPN              "

## 2018-02-19 NOTE — MR AVS SNAPSHOT
After Visit Summary   2/19/2018    Ulysses Purcell    MRN: [id number]           Patient Information     Date Of Birth          1950        Visit Information        Provider Department      2/19/2018 1:30 PM Destiney Booker MD [facility]        Today's Diagnoses     CLL (chronic lymphocytic leukemia) (H)    -  1      Care Instructions    We would like to see you back in 2 months . Please come 30minute(s) prior for lab work. You have been scheduled for CT scans one weeks prior to your appointment.    When you are in need of a refill of your medications, please call your pharmacy and they will send us the request. If you have any questions please call [phone]            Follow-ups after your visit        Your next 10 appointments already scheduled     Apr 11, 2018 12:30 PM CDT   (Arrive by 11:30 AM)   CT SOFT TISSUE NECK W CONTRAST with HICT1   HI CT SCAN ([facility] )    [address] [place] [address] [id number] [phone]           Please bring any scans or X-rays taken at other hospitals, if similar tests were done. Also bring a list of your medicines, including vitamins, minerals and over-the-counter drugs. It is safest to leave personal items at home.  Be sure to tell your doctor:   If you have any allergies.   If there s any chance you are pregnant.   If you are breastfeeding.    If you have diabetes as your medication may need to be adjusted for this exam.  You will have contrast for this exam. To prepare:   Do not eat or drink for 2 hours before your exam. If you need to take medicine, you may take it with small sips of water. (We may ask you to take liquid medicine as well.)   The day before your exam, drink extra fluids at least six 8-ounce glasses (unless your doctor tells you to restrict your fluids).  Patients over 70 or patients with diabetes or kidney problems:   If you haven t had a blood test (creatinine test) within the last 30 days, the  Cardiologist/Radiologist may require you to get this test prior to your exam.  Please wear loose clothing, such as a sweat suit or jogging clothes. Avoid snaps, zippers and other metal. We may ask you to undress and put on a hospital gown.  If you have any questions, please call the Imaging Department where you will have your exam.            Apr 11, 2018  1:00 PM CDT   (Arrive by 11:30 AM)   CT CHEST/ABDOMEN/PELVIS W CONTRAST with HICT1   HI CT SCAN ([facility] )    [address] [place] [id number] [phone]           Please bring any scans or X-rays taken at other hospitals, if similar tests were done. Also bring a list of your medicines, including vitamins, minerals and over-the-counter drugs. It is safest to leave personal items at home.  Be sure to tell your doctor:   If you have any allergies.   If there s any chance you are pregnant.   If you are breastfeeding.  You may have contrast for this exam. To prepare:   Do not eat or drink for 2 hours before your exam. If you need to take medicine, you may take it with small sips of water. (We may ask you to take liquid medicine as well.)   The day before your exam, drink extra fluids at least six 8-ounce glasses (unless your doctor tells you to restrict your fluids).   You will be given instructions on how to drink the contrast.  Patients over 70 or patients with diabetes or kidney problems:   If you haven t had a blood test (creatinine test) within the last 30 days, the Cardiologist/Radiologist may require you to get this test prior to your exam.  If you have diabetes:   Continue to take your metformin medication on the day of your exam  Please wear loose clothing, such as a sweat suit or jogging clothes. Avoid snaps, zippers and other metal. We may ask you to undress and put on a hospital gown.  If you have any questions, please call the Imaging Department where you will have your exam.            Apr 17, 2018  9:30 AM CDT   LAB with KADY  LAB   [facility] [place] )    [address]  [place] [address]   [phone]            Apr 17, 2018 10:00 AM CDT   (Arrive by 9:45 AM)   Return Visit with Destiney Booker MD   [facility] ([facility] [place] )    [address]  [place] [address]   [phone]              Future tests that were ordered for you today     Open Future Orders        Priority Expected Expires Ordered    CT Chest/Abdomen/Pelvis w Contrast Routine 4/11/2018 2/19/2019 2/19/2018    CT Soft Tissue Neck w Contrast Routine 4/10/2018 2/19/2019 2/19/2018    Lactate Dehydrogenase Routine 4/16/2018 2/12/2019 2/19/2018    CBC with platelets differential Routine 4/16/2018 2/12/2019 2/19/2018    Comprehensive metabolic panel Routine 4/16/2018 2/12/2019 2/19/2018            Who to contact     If you have questions or need follow up information about today's clinic visit or your schedule please contact [facility] directly at [phone].  Normal or non-critical lab and imaging results will be communicated to you by MyChart, letter or phone within 4 business days after the clinic has received the results. If you do not hear from us within 7 days, please contact the clinic through [employer] or phone. If you have a critical or abnormal lab result, we will notify you by phone as soon as possible.  Submit refill requests through [employer] or call your pharmacy and they will forward the refill request to us. Please allow 3 business days for your refill to be completed.          Additional Information About Your Visit        MyChart Information     [employer] gives you secure access to your electronic health record. If you see a primary care provider, you can also send messages to your care team and make appointments. If you have questions, please call your primary care clinic.  If you do not have a primary care provider, please call [phone] and they will assist you.    "     Care EveryWhere ID     This is your Care EveryWhere ID. This could be used by other organizations to access your [place] medical records  [id number]        Your Vitals Were     Pulse Temperature Respirations Height Pulse Oximetry BMI (Body Mass Index)    72 97.9  F (36.6  C) (Tympanic) 20 1.791 m (5' 10.5\") 99% 23.74 kg/m2       Blood Pressure from Last 3 Encounters:   02/19/18 121/82   11/29/17 155/88   10/16/17 140/80    Weight from Last 3 Encounters:   02/19/18 76.1 kg (167 lb 12.8 oz)   11/29/17 76.7 kg (169 lb)   10/16/17 74.8 kg (165 lb)               Primary Care Provider Office Phone # Fax #    Juaneddie Kenney  [phone] [address] [address]        Equal Access to Services     ANNA LARA AH: Hadii favian watkinso Sodayana, waaxda luqadaha, qaybta kaalmada adeegyada, howard jones . [facility] [phone].    ATENCIÓN: Si habla español, tiene a zamora disposición servicios gratuitos de asistencia lingüística. Llame al [phone].    We comply with applicable federal civil rights laws and [place] laws. We do not discriminate on the basis of race, color, national origin, age, disability, sex, sexual orientation, or gender identity.            Thank you!     Thank you for choosing [facility]  for your care. Our goal is always to provide you with excellent care. Hearing back from our patients is one way we can continue to improve our services. Please take a few minutes to complete the written survey that you may receive in the mail after your visit with us. Thank you!             Your Updated Medication List - Protect others around you: Learn how to safely use, store and throw away your medicines at www.disposemymeds.org.          This list is accurate as of 2/19/18  2:51 PM.  Always use your most recent med list.                   Brand Name Dispense Instructions for use Diagnosis    aspirin 81 MG tablet      Take  " by mouth daily.        calcium 600/vitamin D 600-400 MG-UNIT per tablet   Generic drug:  calcium-vitamin D      Take 1 tablet by mouth daily.        CoQ10 100 MG Caps      Take  by mouth daily.        fish oil-omega-3 fatty acids 1000 MG capsule      Take 1 capsule by mouth daily.        Garlic 1000 MG Caps      Take  by mouth daily.        GLUCOSAMINE CHONDR COMPLEX PO      Take  by mouth daily.        iron 325 (65 FE) MG tablet      Take 1 tablet by mouth daily.        lisinopril 30 MG tablet    PRINIVIL,ZESTRIL    90 tablet    TAKE 1 TABLET DAILY BY MOUTH    Essential hypertension       MAGNESIUM OXIDE PO      Take 400 mg by mouth    CLL (chronic lymphocytic leukemia) (H)       MULTI-B COMPLEX PO      Take  by mouth daily.        multivitamin Tabs tablet      Take 1 tablet by mouth daily

## 2018-02-19 NOTE — TELEPHONE ENCOUNTER
DATE:  2/19/2018   TIME OF RECEIPT FROM LAB:  8:52  LAB TEST:  WBC  LAB VALUE:  102.5  RESULTS GIVEN WITH READ-BACK TO (PROVIDER):  Dr Booker  TIME LAB VALUE REPORTED TO PROVIDER:   8:54

## 2018-02-20 ASSESSMENT — PATIENT HEALTH QUESTIONNAIRE - PHQ9: SUM OF ALL RESPONSES TO PHQ QUESTIONS 1-9: 0

## 2018-04-11 ENCOUNTER — HOSPITAL ENCOUNTER (OUTPATIENT)
Dept: CT IMAGING | Facility: HOSPITAL | Age: 68
End: 2018-04-11
Attending: INTERNAL MEDICINE
Payer: MEDICARE

## 2018-04-11 ENCOUNTER — HOSPITAL ENCOUNTER (OUTPATIENT)
Dept: CT IMAGING | Facility: HOSPITAL | Age: 68
Discharge: HOME | End: 2018-04-11
Attending: INTERNAL MEDICINE | Admitting: INTERNAL MEDICINE
Payer: MEDICARE

## 2018-04-11 DIAGNOSIS — C91.10 CLL (CHRONIC LYMPHOCYTIC LEUKEMIA) (H): ICD-10-CM

## 2018-04-11 PROCEDURE — 70491 CT SOFT TISSUE NECK W/DYE: CPT | Mod: TC

## 2018-04-11 PROCEDURE — 71260 CT THORAX DX C+: CPT | Mod: TC

## 2018-04-11 PROCEDURE — 25000128 H RX IP 250 OP 636: Performed by: RADIOLOGY

## 2018-04-11 RX ORDER — IOPAMIDOL 612 MG/ML
100 INJECTION, SOLUTION INTRAVASCULAR ONCE
Status: COMPLETED | OUTPATIENT
Start: 2018-04-11 | End: 2018-04-11

## 2018-04-11 RX ORDER — IOPAMIDOL 612 MG/ML
50 INJECTION, SOLUTION INTRAVASCULAR ONCE
Status: COMPLETED | OUTPATIENT
Start: 2018-04-11 | End: 2018-04-11

## 2018-04-11 RX ADMIN — DIATRIZOATE MEGLUMINE AND DIATRIZOATE SODIUM 30 ML: 660; 100 SOLUTION ORAL; RECTAL at 13:01

## 2018-04-11 RX ADMIN — IOPAMIDOL 50 ML: 612 INJECTION, SOLUTION INTRAVENOUS at 13:01

## 2018-04-11 RX ADMIN — IOPAMIDOL 100 ML: 612 INJECTION, SOLUTION INTRAVENOUS at 13:01

## 2018-04-17 ENCOUNTER — TELEPHONE (OUTPATIENT)
Dept: ONCOLOGY | Facility: OTHER | Age: 68
End: 2018-04-17

## 2018-04-17 ENCOUNTER — ONCOLOGY VISIT (OUTPATIENT)
Dept: ONCOLOGY | Facility: OTHER | Age: 68
End: 2018-04-17
Attending: NURSE PRACTITIONER
Payer: MEDICARE

## 2018-04-17 VITALS
OXYGEN SATURATION: 98 % | BODY MASS INDEX: 23.57 KG/M2 | HEART RATE: 68 BPM | WEIGHT: 168.4 LBS | TEMPERATURE: 97 F | SYSTOLIC BLOOD PRESSURE: 137 MMHG | RESPIRATION RATE: 18 BRPM | DIASTOLIC BLOOD PRESSURE: 80 MMHG | HEIGHT: 71 IN

## 2018-04-17 DIAGNOSIS — C91.10 CLL (CHRONIC LYMPHOCYTIC LEUKEMIA) (H): Primary | ICD-10-CM

## 2018-04-17 DIAGNOSIS — C91.10 CLL (CHRONIC LYMPHOCYTIC LEUKEMIA) (H): ICD-10-CM

## 2018-04-17 LAB
ALBUMIN SERPL-MCNC: 4.2 G/DL (ref 3.4–5)
ALP SERPL-CCNC: 68 U/L (ref 40–150)
ALT SERPL W P-5'-P-CCNC: 30 U/L (ref 0–70)
ANION GAP SERPL CALCULATED.3IONS-SCNC: 5 MMOL/L (ref 3–14)
AST SERPL W P-5'-P-CCNC: 26 U/L (ref 0–45)
BASOPHILS # BLD AUTO: 1 10E9/L (ref 0–0.2)
BASOPHILS NFR BLD AUTO: 1 %
BILIRUB SERPL-MCNC: 0.9 MG/DL (ref 0.2–1.3)
BUN SERPL-MCNC: 14 MG/DL (ref 7–30)
CALCIUM SERPL-MCNC: 8.7 MG/DL (ref 8.5–10.1)
CHLORIDE SERPL-SCNC: 104 MMOL/L (ref 94–109)
CO2 SERPL-SCNC: 29 MMOL/L (ref 20–32)
CREAT SERPL-MCNC: 0.92 MG/DL (ref 0.66–1.25)
DIFFERENTIAL METHOD BLD: ABNORMAL
EOSINOPHIL # BLD AUTO: 2.9 10E9/L (ref 0–0.7)
EOSINOPHIL NFR BLD AUTO: 3 %
ERYTHROCYTE [DISTWIDTH] IN BLOOD BY AUTOMATED COUNT: 13.8 % (ref 10–15)
GFR SERPL CREATININE-BSD FRML MDRD: 82 ML/MIN/1.7M2
GLUCOSE SERPL-MCNC: 97 MG/DL (ref 70–99)
HCT VFR BLD AUTO: 39.9 % (ref 40–53)
HGB BLD-MCNC: 13.6 G/DL (ref 13.3–17.7)
LDH SERPL L TO P-CCNC: 308 U/L (ref 85–227)
LYMPHOCYTES # BLD AUTO: 88.7 10E9/L (ref 0.8–5.3)
LYMPHOCYTES NFR BLD AUTO: 92 %
MCH RBC QN AUTO: 30.6 PG (ref 26.5–33)
MCHC RBC AUTO-ENTMCNC: 34.1 G/DL (ref 31.5–36.5)
MCV RBC AUTO: 90 FL (ref 78–100)
MONOCYTES # BLD AUTO: 0 10E9/L (ref 0–1.3)
MONOCYTES NFR BLD AUTO: 0 %
NEUTROPHILS # BLD AUTO: 3.9 10E9/L (ref 1.6–8.3)
NEUTROPHILS NFR BLD AUTO: 4 %
PLATELET # BLD AUTO: 120 10E9/L (ref 150–450)
POTASSIUM SERPL-SCNC: 5.1 MMOL/L (ref 3.4–5.3)
PROT SERPL-MCNC: 7.2 G/DL (ref 6.8–8.8)
RBC # BLD AUTO: 4.44 10E12/L (ref 4.4–5.9)
SODIUM SERPL-SCNC: 138 MMOL/L (ref 133–144)
VARIANT LYMPHS BLD QL SMEAR: PRESENT
WBC # BLD AUTO: 96.4 10E9/L (ref 4–11)

## 2018-04-17 PROCEDURE — 85025 COMPLETE CBC W/AUTO DIFF WBC: CPT | Mod: ZL | Performed by: INTERNAL MEDICINE

## 2018-04-17 PROCEDURE — 36415 COLL VENOUS BLD VENIPUNCTURE: CPT | Mod: ZL | Performed by: INTERNAL MEDICINE

## 2018-04-17 PROCEDURE — 83615 LACTATE (LD) (LDH) ENZYME: CPT | Mod: ZL | Performed by: INTERNAL MEDICINE

## 2018-04-17 PROCEDURE — 80053 COMPREHEN METABOLIC PANEL: CPT | Mod: ZL | Performed by: INTERNAL MEDICINE

## 2018-04-17 PROCEDURE — G0463 HOSPITAL OUTPT CLINIC VISIT: HCPCS

## 2018-04-17 PROCEDURE — 99213 OFFICE O/P EST LOW 20 MIN: CPT | Performed by: NURSE PRACTITIONER

## 2018-04-17 ASSESSMENT — PAIN SCALES - GENERAL: PAINLEVEL: NO PAIN (0)

## 2018-04-17 NOTE — NURSING NOTE
"Chief Complaint   Patient presents with     RECHECK     2 month follow up chronic lymphocytic leukemia       Initial /80  Pulse 68  Temp 97  F (36.1  C) (Tympanic)  Resp 18  Ht 1.791 m (5' 10.5\")  Wt 76.4 kg (168 lb 6.4 oz)  SpO2 98%  BMI 23.82 kg/m2 Estimated body mass index is 23.82 kg/(m^2) as calculated from the following:    Height as of this encounter: 1.791 m (5' 10.5\").    Weight as of this encounter: 76.4 kg (168 lb 6.4 oz).  Medication Reconciliation: complete   Immunizations reviewed, advanced directives on file, pain = 0, PHQ9 = 0.  Patient was assessed using the NCCN psychosocial distress thermometer. Patient rated the score as a 0. Patient rated current stressors as none. Stressors will be brought to the attention of provider or Oncology RN Care Coordinator for a score of 6 or greater or per nurses discretion.   Annalise Calvert LPN                "

## 2018-04-17 NOTE — TELEPHONE ENCOUNTER
DATE:  4/17/2018   TIME OF RECEIPT FROM LAB:  8:32 am  LAB TEST:  WBC  LAB VALUE:  96.4  RESULTS GIVEN WITH READ-BACK TO (PROVIDER):  Louisa Ga NP at 8:40 am DR Booker at 8:44  TIME LAB VALUE REPORTED TO PROVIDER:   8:44 am  Annalise Calvert LPN

## 2018-04-17 NOTE — MR AVS SNAPSHOT
After Visit Summary   4/17/2018    Ulysses Purcell    MRN: [id number]           Patient Information     Date Of Birth          1950        Visit Information        Provider Department      4/17/2018 10:00 AM Pettinelli, Nicole D, NP [facility] [place]        Today's Diagnoses     CLL (chronic lymphocytic leukemia) (H)    -  1      Care Instructions    We would like to see you back in 3 months. Please come 30 minutes prior for lab work.  If you have any questions please call [phone]  Other instructions: none          Follow-ups after your visit        Your next 10 appointments already scheduled     Jul 18, 2018  9:00 AM CDT   LAB with HC LAB   [facility] )    [address] [place] [address]   [phone]            Jul 18, 2018  9:30 AM CDT   (Arrive by 9:15 AM)   Return Visit with Nicole Ga NP   [facility] [place] ([facility] )    [address]  [place] [address]   [phone]              Future tests that were ordered for you today     Open Future Orders        Priority Expected Expires Ordered    CBC with platelets differential Routine 7/17/2018 8/17/2018 4/17/2018    Comprehensive metabolic panel Routine 7/17/2018 8/17/2018 4/17/2018    Lactate Dehydrogenase Routine 7/17/2018 8/17/2018 4/17/2018            Who to contact     If you have questions or need follow up information about today's clinic visit or your schedule please contact [facility] directly at [phone].  Normal or non-critical lab and imaging results will be communicated to you by MyChart, letter or phone within 4 business days after the clinic has received the results. If you do not hear from us within 7 days, please contact the clinic through MyChart or phone. If you have a critical or abnormal lab result, we will notify you by phone as soon as possible.  Submit refill requests through [employer] or  "call your pharmacy and they will forward the refill request to us. Please allow 3 business days for your refill to be completed.          Additional Information About Your Visit        MyChart Information     [employer] gives you secure access to your electronic health record. If you see a primary care provider, you can also send messages to your care team and make appointments. If you have questions, please call your primary care clinic.  If you do not have a primary care provider, please call [phone] and they will assist you.        Care EveryWhere ID     This is your Care EveryWhere ID. This could be used by other organizations to access your [place] medical records  [id number]        Your Vitals Were     Pulse Temperature Respirations Height Pulse Oximetry BMI (Body Mass Index)    68 97  F (36.1  C) (Tympanic) 18 1.791 m (5' 10.5\") 98% 23.82 kg/m2       Blood Pressure from Last 3 Encounters:   04/17/18 137/80   02/19/18 121/82   11/29/17 155/88    Weight from Last 3 Encounters:   04/17/18 76.4 kg (168 lb 6.4 oz)   02/19/18 76.1 kg (167 lb 12.8 oz)   11/29/17 76.7 kg (169 lb)               Primary Care Provider Office Phone # Fax #    Juan Keith DO [place] [phone]       [facility] [id number]        Equal Access to Services     ANNA LARA AH: Hadii aad ku hadasho Soomaali, waaxda luqadaha, qaybta kaalmada adeegyada, howard jones . [facility] [phone].    ATENCIÓN: Si habla español, tiene a zamora disposición servicios gratuitos de asistencia lingüística. Lamar al [phone].    We comply with applicable federal civil rights laws and [place] laws. We do not discriminate on the basis of race, color, national origin, age, disability, sex, sexual orientation, or gender identity.            Thank you!     Thank you for choosing [facility]  for your care. Our goal is always to provide you with excellent care. Hearing back from our " patients is one way we can continue to improve our services. Please take a few minutes to complete the written survey that you may receive in the mail after your visit with us. Thank you!             Your Updated Medication List - Protect others around you: Learn how to safely use, store and throw away your medicines at www.disposemymeds.org.          This list is accurate as of 4/17/18  5:05 PM.  Always use your most recent med list.                   Brand Name Dispense Instructions for use Diagnosis    aspirin 81 MG tablet      Take  by mouth daily.        calcium 600/vitamin D 600-400 MG-UNIT per tablet   Generic drug:  calcium-vitamin D      Take 1 tablet by mouth daily.        CoQ10 100 MG Caps      Take  by mouth daily.        fish oil-omega-3 fatty acids 1000 MG capsule      Take 1 capsule by mouth daily.        Garlic 1000 MG Caps      Take  by mouth daily.        GLUCOSAMINE CHONDR COMPLEX PO      Take  by mouth daily.        iron 325 (65 Fe) MG tablet      Take 1 tablet by mouth daily.        lisinopril 30 MG tablet    PRINIVIL,ZESTRIL    90 tablet    TAKE 1 TABLET DAILY BY MOUTH    Essential hypertension       MAGNESIUM OXIDE PO      Take 400 mg by mouth    CLL (chronic lymphocytic leukemia) (H)       MULTI-B COMPLEX PO      Take  by mouth daily.        multivitamin Tabs tablet      Take 1 tablet by mouth daily

## 2018-04-17 NOTE — PATIENT INSTRUCTIONS
We would like to see you back in 3 months. Please come 30 minutes prior for lab work.  If you have any questions please call [phone]  Other instructions: none

## 2018-04-17 NOTE — PROGRESS NOTES
Oncology Follow-up Visit:  April 17, 2018    Reason for Visit:  Patient presents with:  RECHECK: 2 month follow up chronic lymphocytic leukemia     Nursing Note and documentation reviewed: yes    HPI:   This is a 67-year-old male patient who presents to the oncology clinic today in routine follow-up of CLL.  Extensive workup was completed in August of 2013 with initial consult. He has been followed with surveillance.  He presents today telling me he is feeling good.  He's had no issues with loss of appetite or loss of weight and denies any fevers, night sweats or increased fatigue.  He denies any new lumps or bumps.  He remains active and actually just returned from a month in [place].   Oncologic History:  Damien was diagnosed with CLL in August 2013 after his routine physical with Dr. Kenney showed an elevation of his white blood cell count on his CBC. CBC at that time revealed WBC of 18.9 with 69.6% lymphocytes, H&H was 14.8 and 42.3, platelet count was 158,000. Peripheral blood smear showed lymphocytosis consistent with chronic lymphocytic leukemia/small lymphocytic lymphoma and mild thrombocytopenia. Peripheral blood flow for cytometry showed an abnormal B cell population consistent with B-cell CLL. The patient was CD5-positive, CD10 negative as well as CD23 positive with 47% B cells expressed at C38 above levels of controls, 80% B cells had levels of cytoplasmic 70 comparable to T lymphocytes and assessment was sent for cytogenetics and FISH. Serum protein electrophoresis was negative. The FISH study was not able to be completed at that time.   He was then referred to Dr. Shantanu Stubbs at the [facility] who saw the patient on 10/16/2013. In review of patient's studies, he felt the patient had stage II CLL as he did have mild splenomegaly on his PET scan. He had concerns about flow cytometry findings as it pointed to a more aggressive disease and the FISH testing was repeated and showed predominance  of trisomy 12 which was consistent with intermediate prognosis. It was felt the major determinant of prognosis in CLL would be the behavior of the disease over the course of the next several months to a year and in subsequent followups patient has been essentially asymptomatic. Dr. Tidwell recommended CBCs every 3 months along with physical exams for nodes and organomegaly and recommended a CT scan yearly.     In May 2017, patient's WBC and lymphocytes increased and imaging was completed.  CT scan of the abdomen and pelvis showed a mass and he was sent to the [facility] for evaluation.  He underwent surgical exploration on 6/8/2017 by Dr. Goodrich and no mass was found.  Biopsies were completed of some prominent nodes with pathology consistent with his CLL diagnoses.  He followed up with Dr. Goodrich on 6/28/17 and repeat CT scan was done which showed no mass.      Current Chemo Regime/TX: n/a  Current Cycle: n/a  # of completed cycles: n/a      Previous treatment: n/a     Past Medical History:   Diagnosis Date     Chronic lymphocytic leukemia (CLL), T-cell (H)     white counts in the 34,000 to 40,000      Elevated blood pressure reading without diagnosis of hypertension 10/16/2002     Fracture of metatarsal bone(s), closed 10/29/2001    right 5th metatarsal bone     Hypertension      Other orthopedic aftercare(V54.89) 11/08/2001     Pericarditis     2005     Routine general medical examination at [facility] 12/01/2000     Special screening for malignant neoplasms, colon 02/04/2004     Tubular adenoma of colon 02/2014    single        Past Surgical History:   Procedure Laterality Date     BACK SURGERY  1997    L4-L5 partial laminectomy     BIOPSY  06/08/2017    abdomen     COLONOSCOPY  02/04/2004    No polyps     COLONOSCOPY  8/4/2014    Procedure: COLONOSCOPY;  Surgeon: Meliza Morales MD;  Location: [facility]      LEFT HEART CATHETERIZATION  05/10/2005    Normal angiogram     LAMINECTOMY CERIVCAL POSTERIOR  ONE LEVEL  1997     LAPAROSCOPY DIAGNOSTIC (GENERAL) N/A 6/8/2017    Procedure: LAPAROSCOPY DIAGNOSTIC (GENERAL);  Diagnostic Laparoscopy, Laparatomy Open resection of Mesenteric Lymph Node ;  Surgeon: Melba Goodrich MD;  Location:  OR       Family History   Problem Relation Age of Onset     HEART DISEASE Mother      stents the 2nd time, CABG     C.A.D. Mother      Coronary Artery Disease Mother      CEREBROVASCULAR DISEASE Father 58     HEART DISEASE Father      Hyperlipidemia Father      Unknown/Adopted Maternal Grandmother      Unknown/Adopted Maternal Grandfather      Unknown/Adopted Paternal Grandmother      Unknown/Adopted Paternal Grandfather      Hypertension Brother      Other Cancer Brother      Hypertension Brother      Other Cancer Brother      CANCER No family hx of      skin cancer     DIABETES No family hx of      Breast Cancer No family hx of      Colon Cancer No family hx of      Anesthesia Reaction No family hx of      Asthma No family hx of      Thyroid Disease No family hx of        Social History     Social History     Marital status:      Spouse name: N/A     Number of children: N/A     Years of education: N/A     Occupational History     Not on file.     Social History Main Topics     Smoking status: Never Smoker     Smokeless tobacco: Never Used     Alcohol use Yes      Comment: red wine nightly     Drug use: No     Sexual activity: Yes     Partners: Female     Other Topics Concern     Parent/Sibling W/ Cabg, Mi Or Angioplasty Before 65f 55m? Yes     father     Social History Narrative       Current Outpatient Prescriptions   Medication     lisinopril (PRINIVIL,ZESTRIL) 30 MG tablet     multivitamin (OCUVITE) TABS     MAGNESIUM OXIDE PO     aspirin 81 MG tablet     calcium-vitamin D (CALCIUM 600/VITAMIN D) 600-400 MG-UNIT per tablet     fish oil-omega-3 fatty acids (FISH OIL) 1000 MG capsule     Garlic 1000 MG CAPS     Coenzyme Q10 (COQ10) 100 MG CAPS      "Glucosamine-Chondroitin (GLUCOSAMINE CHONDR COMPLEX PO)     Ferrous Sulfate (IRON) 325 (65 FE) MG tablet     B Complex-Biotin-FA (MULTI-B COMPLEX PO)     No current facility-administered medications for this visit.         No Known Allergies    Review Of Systems:  Constitutional: denies fever, weight changes, chills, and night sweats.  Eyes: denies blurred or double vision  Ears/Nose/Throat: denies ear pain, nose problems, difficulty swallowing  Respiratory: denies shortness of breath, cough  Skin: denies rash, lesions  Cardiovascular: denies chest pain, palpitations, edema  Gastrointestinal: denies abdominal pain, bloating, nausea, vomiting, early satiety  Genitourinary: denies difficulty with urination, blood in urine  Musculoskeletal:denies new muscle pain, bone pain  Neurologic: denies lightheadedness, headaches, numbness or tingling  Psychiatric: denies anxiety, depression  Hematologic/Lymphatic/Immunologic: denies easy bleeding, lumps or bumps noted; has easy bruising  Endocrine: Denies increased thirst    Physical Exam:  /80  Pulse 68  Temp 97  F (36.1  C) (Tympanic)  Resp 18  Ht 1.791 m (5' 10.5\")  Wt 76.4 kg (168 lb 6.4 oz)  SpO2 98%  BMI 23.82 kg/m2    GENERAL APPEARANCE: Healthy, alert and in no acute distress.  HEENT: Normocephalic, Sclerae anicteric. Oropharynx without ulcers, lesions, or thrush.  NECK: No asymmetry or masses, no thyromegaly.  LYMPHATICS: No palpable cervical, supraclavicular, axillary, or inguinal nodes   RESP: Lungs clear to auscultation bilaterally, respirations regular and easy  CARDIOVASCULAR: Regular rate and rhythm. Normal S1, S2; no murmur, gallop, or rub.  ABDOMEN: Soft, nontender. Bowel sounds auscultated all 4 quadrants. No palpable organomegaly or masses.  MUSCULOSKELETAL: Extremities without gross deformities noted. No edema of bilateral lower extremities.  NEURO: Alert and oriented x 3.  Gait steady.  PSYCHIATRIC: Mentation and affect appear normal.  Mood " appropriate.    Laboratory:    Results for orders placed or performed in visit on 04/17/18   CBC with platelets differential   Result Value Ref Range    WBC 96.4 (HH) 4.0 - 11.0 10e9/L    RBC Count 4.44 4.4 - 5.9 10e12/L    Hemoglobin 13.6 13.3 - 17.7 g/dL    Hematocrit 39.9 (L) 40.0 - 53.0 %    MCV 90 78 - 100 fl    MCH 30.6 26.5 - 33.0 pg    MCHC 34.1 31.5 - 36.5 g/dL    RDW 13.8 10.0 - 15.0 %    Platelet Count 120 (L) 150 - 450 10e9/L    Diff Method Manual Differential     % Neutrophils 4.0 %    % Lymphocytes 92.0 %    % Monocytes 0.0 %    % Eosinophils 3.0 %    % Basophils 1.0 %    Absolute Neutrophil 3.9 1.6 - 8.3 10e9/L    Absolute Lymphocytes 88.7 (H) 0.8 - 5.3 10e9/L    Absolute Monocytes 0.0 0.0 - 1.3 10e9/L    Absolute Eosinophils 2.9 (H) 0.0 - 0.7 10e9/L    Absolute Basophils 1.0 (H) 0.0 - 0.2 10e9/L    Reactive Lymphs Present    Comprehensive metabolic panel   Result Value Ref Range    Sodium 138 133 - 144 mmol/L    Potassium 5.1 3.4 - 5.3 mmol/L    Chloride 104 94 - 109 mmol/L    Carbon Dioxide 29 20 - 32 mmol/L    Anion Gap 5 3 - 14 mmol/L    Glucose 97 70 - 99 mg/dL    Urea Nitrogen 14 7 - 30 mg/dL    Creatinine 0.92 0.66 - 1.25 mg/dL    GFR Estimate 82 >60 mL/min/1.7m2    GFR Estimate If Black >90 >60 mL/min/1.7m2    Calcium 8.7 8.5 - 10.1 mg/dL    Bilirubin Total 0.9 0.2 - 1.3 mg/dL    Albumin 4.2 3.4 - 5.0 g/dL    Protein Total 7.2 6.8 - 8.8 g/dL    Alkaline Phosphatase 68 40 - 150 U/L    ALT 30 0 - 70 U/L    AST 26 0 - 45 U/L   Lactate Dehydrogenase   Result Value Ref Range    Lactate Dehydrogenase 308 (H) 85 - 227 U/L     Imaging Studies:    Results for orders placed or performed during the hospital encounter of 04/11/18   CT Chest/Abdomen/Pelvis w Contrast    Narrative    PROCEDURE: CT CHEST/ABDOMEN/PELVIS W CONTRAST 4/11/2018 1:03 PM    HISTORY: staging of CLL; CLL (chronic lymphocytic leukemia) (H)    COMPARISONS: May and June 2017    Meds/Dose Given: ISOVUE 300   150ML    TECHNIQUE: CT scan  of the chest abdomen and pelvis with sagittal  coronal reconstructions    FINDINGS: Lungs are clear. There are apparent increase in number of  axillary lymph nodes bilaterally which is stable from previous  examination in May 2017. The mediastinal lymph nodes appear normal.  Hilar lymph nodes are normal. No pleural abnormality is seen. The  heart is normal in size. There are some low-density lesions in the  liver which are stable from previous examination most likely cysts.  There is marked splenomegaly. This is stable from previous  examination. There are enlarged retrocrural periaortic and pericaval  lymph nodes which are stable from previous examination. There are 2  small right adrenal masses which is stable from previous examination.  Right left kidneys show no some masses or hydronephrosis. Mesenteric  lymph nodes are for remained unchanged pelvic lymph nodes are stable.  No intraperitoneal masses are seen. Bladder and rectum appear normal.  Regional skeleton is intact.         Impression    IMPRESSION: Splenomegaly and intra-abdominal lymph nodes have remained  stable the axillary lymph nodes have remained stable as compared to  previous examination in May and June 2017    TAMEKA HERNANDEZ MD       ASSESSMENT/PLAN:    #1 CLL: Stage II chronic lymphocytic leukemia. WBC and lymphocytes actually improved a tad.  No evidence of increased lymphadenopathy on his scans.  He is asymptomatic.  He will follow up in 3 months with a CBC, CMP and LDH.     I encouraged patient to call with any questions or concerns.       Nicole DEXTER, FNP-BC, AOCNP

## 2018-04-18 ASSESSMENT — PATIENT HEALTH QUESTIONNAIRE - PHQ9: SUM OF ALL RESPONSES TO PHQ QUESTIONS 1-9: 0

## 2018-07-08 DIAGNOSIS — I10 ESSENTIAL HYPERTENSION: ICD-10-CM

## 2018-07-09 RX ORDER — LISINOPRIL 30 MG/1
TABLET ORAL
Qty: 90 TABLET | Refills: 0 | Status: SHIPPED | OUTPATIENT
Start: 2018-07-09 | End: 2018-07-23

## 2018-07-09 NOTE — TELEPHONE ENCOUNTER
lisinopril      Last Written Prescription Date:  10/12/17  Last Fill Quantity: 90,   # refills: 2  Last Office Visit: 10/16/17  Future Office visit:    Next 5 appointments (look out 90 days)     Jul 18, 2018  9:30 AM CDT   (Arrive by 9:15 AM)   Return Visit with Nicole Ga NP   [facility] ([facility] )    [address] [place] [address]   [phone]

## 2018-07-18 ENCOUNTER — APPOINTMENT (OUTPATIENT)
Dept: LAB | Facility: OTHER | Age: 68
End: 2018-07-18
Attending: NURSE PRACTITIONER
Payer: MEDICARE

## 2018-07-18 ENCOUNTER — ONCOLOGY VISIT (OUTPATIENT)
Dept: ONCOLOGY | Facility: OTHER | Age: 68
End: 2018-07-18
Attending: NURSE PRACTITIONER
Payer: MEDICARE

## 2018-07-18 VITALS
HEART RATE: 70 BPM | BODY MASS INDEX: 23.1 KG/M2 | TEMPERATURE: 97.8 F | OXYGEN SATURATION: 98 % | DIASTOLIC BLOOD PRESSURE: 60 MMHG | WEIGHT: 165 LBS | SYSTOLIC BLOOD PRESSURE: 130 MMHG | HEIGHT: 71 IN

## 2018-07-18 DIAGNOSIS — Z12.11 SPECIAL SCREENING FOR MALIGNANT NEOPLASMS, COLON: ICD-10-CM

## 2018-07-18 DIAGNOSIS — C91.10 CLL (CHRONIC LYMPHOCYTIC LEUKEMIA) (H): Primary | ICD-10-CM

## 2018-07-18 DIAGNOSIS — D64.9 ANEMIA, UNSPECIFIED TYPE: ICD-10-CM

## 2018-07-18 DIAGNOSIS — L91.8 SKIN TAG: ICD-10-CM

## 2018-07-18 PROCEDURE — G0463 HOSPITAL OUTPT CLINIC VISIT: HCPCS | Performed by: NURSE PRACTITIONER

## 2018-07-18 PROCEDURE — 99213 OFFICE O/P EST LOW 20 MIN: CPT | Performed by: NURSE PRACTITIONER

## 2018-07-18 ASSESSMENT — PAIN SCALES - GENERAL: PAINLEVEL: NO PAIN (0)

## 2018-07-18 NOTE — PATIENT INSTRUCTIONS
We would like to see you back in 3 months. Please come the morning of the appointment for lab work.  If you have any questions please call [phone]  Other instructions:  Please obtain the stool test and return

## 2018-07-18 NOTE — MR AVS SNAPSHOT
After Visit Summary   7/18/2018    Ulysses Purcell    MRN: [id number]           Patient Information     Date Of Birth          1950        Visit Information        Provider Department      7/18/2018 9:30 AM Pettinelli, Nicole D, NP [facility]        Today's Diagnoses     CLL (chronic lymphocytic leukemia) (H)    -  1    Special screening for malignant neoplasms, colon        Anemia, unspecified type          Care Instructions    We would like to see you back in 3 months. Please come the morning of the appointment for lab work.  If you have any questions please call [phone]  Other instructions:  Please obtain the stool test and return          Follow-ups after your visit        Your next 10 appointments already scheduled     Oct 24, 2018  8:45 AM CDT   LAB with [facility]   [facility] [place] ([facility] )    [address] [place] [address]   [phone]            Oct 24, 2018  9:20 AM CDT   (Arrive by 9:05 AM)   Return Visit with ALEXANDRA Canoview Phillip [place] ([facility] )    [address] [place] [address]   [phone]              Future tests that were ordered for you today     Open Future Orders        Priority Expected Expires Ordered    Immunos occult blood Routine 7/19/2018 7/18/2019 7/18/2018    CBC with platelets differential Routine 10/18/2018 7/18/2019 7/18/2018    Comprehensive metabolic panel Routine 10/18/2018 7/18/2019 7/18/2018    Lactate Dehydrogenase Routine 10/18/2018 7/18/2019 7/18/2018            Who to contact     If you have questions or need follow up information about today's clinic visit or your schedule please contact [facility] directly at [phone].  Normal or non-critical lab and imaging results will be communicated to you by MyChart, letter or phone within 4 business days after the clinic has received the results. If you do not hear from us within  "7 days, please contact the clinic through [employer] or phone. If you have a critical or abnormal lab result, we will notify you by phone as soon as possible.  Submit refill requests through [employer] or call your pharmacy and they will forward the refill request to us. Please allow 3 business days for your refill to be completed.          Additional Information About Your Visit        [employer] Information     [employer] gives you secure access to your electronic health record. If you see a primary care provider, you can also send messages to your care team and make appointments. If you have questions, please call your primary care clinic.  If you do not have a primary care provider, please call [phone] and they will assist you.        Care EveryWhere ID     This is your Care EveryWhere ID. This could be used by other organizations to access your [facility] medical records  [id number]        Your Vitals Were     Pulse Temperature Height Pulse Oximetry BMI (Body Mass Index)       70 97.8  F (36.6  C) (Tympanic) 1.791 m (5' 10.5\") 98% 23.34 kg/m2        Blood Pressure from Last 3 Encounters:   07/18/18 130/60   04/17/18 137/80   02/19/18 121/82    Weight from Last 3 Encounters:   07/18/18 74.8 kg (165 lb)   04/17/18 76.4 kg (168 lb 6.4 oz)   02/19/18 76.1 kg (167 lb 12.8 oz)               Primary Care Provider Office Phone # Fax #    Juan Keith DO [place] [phone]       [address] [place] [address]        Equal Access to Services     ERON LARA AH: Hadii favian manriquez hadasho Soomaali, waaxda luqadaha, qaybta kaalmada howard robbins . So [facility] [phone].    ATENCIÓN: Si habla español, tiene a zamora disposición servicios gratuitos de asistencia lingüística. Llame al [phone].    We comply with applicable federal civil rights laws and [place] laws. We do not discriminate on the basis of race, color, national origin, age, disability, sex, sexual orientation, " or gender identity.            Thank you!     Thank you for choosing [facility]  for your care. Our goal is always to provide you with excellent care. Hearing back from our patients is one way we can continue to improve our services. Please take a few minutes to complete the written survey that you may receive in the mail after your visit with us. Thank you!             Your Updated Medication List - Protect others around you: Learn how to safely use, store and throw away your medicines at www.disposemymeds.org.          This list is accurate as of 7/18/18 10:12 AM.  Always use your most recent med list.                   Brand Name Dispense Instructions for use Diagnosis    aspirin 81 MG tablet      Take  by mouth daily.        calcium 600/vitamin D 600-400 MG-UNIT per tablet   Generic drug:  calcium-vitamin D      Take 1 tablet by mouth daily.        CoQ10 100 MG Caps      Take  by mouth daily.        fish oil-omega-3 fatty acids 1000 MG capsule      Take 1 capsule by mouth daily.        Garlic 1000 MG Caps      Take  by mouth daily.        GLUCOSAMINE CHONDR COMPLEX PO      Take  by mouth daily.        iron 325 (65 Fe) MG tablet      Take 1 tablet by mouth daily.        lisinopril 30 MG tablet    PRINIVIL,ZESTRIL    90 tablet    TAKE 1 TABLET BY MOUTH ONCE DAILY    Essential hypertension       MAGNESIUM OXIDE PO      Take 400 mg by mouth    CLL (chronic lymphocytic leukemia) (H)       MULTI-B COMPLEX PO      Take  by mouth daily.        multivitamin Tabs tablet      Take 1 tablet by mouth daily

## 2018-07-18 NOTE — PROGRESS NOTES
DATE:  7/18/2018   TIME OF RECEIPT FROM LAB:  0836  LAB TEST:  WBC  LAB VALUE:  97.1  RESULTS GIVEN WITH READ-BACK TO (PROVIDER):  Louisa Ga  TIME LAB VALUE REPORTED TO PROVIDER:   0837  ISRAEL CARSON

## 2018-07-18 NOTE — NURSING NOTE
"Chief Complaint   Patient presents with     Follow Up For     3 month f/u       Initial /60 (BP Location: Right arm, Patient Position: Chair, Cuff Size: Adult Regular)  Pulse 70  Temp 97.8  F (36.6  C) (Tympanic)  Ht 1.791 m (5' 10.5\")  Wt 74.8 kg (165 lb)  SpO2 98%  BMI 23.34 kg/m2 Estimated body mass index is 23.34 kg/(m^2) as calculated from the following:    Height as of this encounter: 1.791 m (5' 10.5\").    Weight as of this encounter: 74.8 kg (165 lb).  Medication Reconciliation: complete    ISRAEL CARSON LPN    "

## 2018-07-18 NOTE — PROGRESS NOTES
Oncology Follow-up Visit:  July 18, 2018    Reason for Visit:  Patient presents with:  Follow Up For: 3 month f/u     Nursing Note and documentation reviewed: yes    HPI:   This is a 68-year-old male patient who presents to the oncology clinic today in routine follow-up of CLL.  Extensive workup was completed in August of 2013 with initial consult. He has been followed with surveillance.  He presents to the clinic today stating he is feeling good.  He has absolutely no complaints today including no fevers, night sweats, weight loss or changes in his appetite.  He has noticed  no new lumps or bumps or concerns.  Does have a skin tag to the side of his neck that he is wondering who he would see to have this removed.    Oncologic History:  Damien was diagnosed with CLL in August 2013 after his routine physical with Dr. Kenney showed an elevation of his white blood cell count on his CBC. CBC at that time revealed WBC of 18.9 with 69.6% lymphocytes, H&H was 14.8 and 42.3, platelet count was 158,000. Peripheral blood smear showed lymphocytosis consistent with chronic lymphocytic leukemia/small lymphocytic lymphoma and mild thrombocytopenia. Peripheral blood flow for cytometry showed an abnormal B cell population consistent with B-cell CLL. The patient was CD5-positive, CD10 negative as well as CD23 positive with 47% B cells expressed at C38 above levels of controls, 80% B cells had levels of cytoplasmic 70 comparable to T lymphocytes and assessment was sent for cytogenetics and FISH. Serum protein electrophoresis was negative. The FISH study was not able to be completed at that time.   He was then referred to Dr. Shantanu Stubbs at the [facility] who saw the patient on 10/16/2013. In review of patient's studies, he felt the patient had stage II CLL as he did have mild splenomegaly on his PET scan. He had concerns about flow cytometry findings as it pointed to a more aggressive disease and the FISH testing was  repeated and showed predominance of trisomy 12 which was consistent with intermediate prognosis. It was felt the major determinant of prognosis in CLL would be the behavior of the disease over the course of the next several months to a year and in subsequent followups patient has been essentially asymptomatic. Dr. Tidwell recommended CBCs every 3 months along with physical exams for nodes and organomegaly and recommended a CT scan yearly.      In May 2017, patient's WBC and lymphocytes increased and imaging was completed.  CT scan of the abdomen and pelvis showed a mass and he was sent to the [facility] for evaluation.  He underwent surgical exploration on 6/8/2017 by Dr. Goodrich and no mass was found.  Biopsies were completed of some prominent nodes with pathology consistent with his CLL diagnoses.  He followed up with Dr. Goodrich on 6/28/17 and repeat CT scan was done which showed no mass.      Current Chemo Regime/TX: n/a  Current Cycle: n/a  # of completed cycles: n/a      Previous treatment: n/a    Past Medical History:   Diagnosis Date     Chronic lymphocytic leukemia (CLL), T-cell (H)     white counts in the 34,000 to 40,000      Elevated blood pressure reading without diagnosis of hypertension 10/16/2002     Fracture of metatarsal bone(s), closed 10/29/2001    right 5th metatarsal bone     Hypertension      Other orthopedic aftercare(V54.89) 11/08/2001     Pericarditis     2005     Routine general medical examination at [facility] 12/01/2000     Special screening for malignant neoplasms, colon 02/04/2004     Tubular adenoma of colon 02/2014    single        Past Surgical History:   Procedure Laterality Date     BACK SURGERY  1997    L4-L5 partial laminectomy     BIOPSY  06/08/2017    abdomen     COLONOSCOPY  02/04/2004    No polyps     COLONOSCOPY  8/4/2014    Procedure: COLONOSCOPY;  Surgeon: Meliza Morales MD;  Location: [facility]      LEFT HEART CATHETERIZATION  05/10/2005    Normal angiogram      LAMINECTOMY CERIVCAL POSTERIOR ONE LEVEL  1997     LAPAROSCOPY DIAGNOSTIC (GENERAL) N/A 6/8/2017    Procedure: LAPAROSCOPY DIAGNOSTIC (GENERAL);  Diagnostic Laparoscopy, Laparatomy Open resection of Mesenteric Lymph Node ;  Surgeon: Melba Goodrich MD;  Location:  OR       Family History   Problem Relation Age of Onset     HEART DISEASE Mother      stents the 2nd time, CABG     C.A.D. Mother      Coronary Artery Disease Mother      Cerebrovascular Disease Father 58     HEART DISEASE Father      Hyperlipidemia Father      Unknown/Adopted Maternal Grandmother      Unknown/Adopted Maternal Grandfather      Unknown/Adopted Paternal Grandmother      Unknown/Adopted Paternal Grandfather      Hypertension Brother      Other Cancer Brother      Hypertension Brother      Other Cancer Brother      Cancer No family hx of      skin cancer     Diabetes No family hx of      Breast Cancer No family hx of      Colon Cancer No family hx of      Anesthesia Reaction No family hx of      Asthma No family hx of      Thyroid Disease No family hx of        Social History     Social History     Marital status:      Spouse name: N/A     Number of children: N/A     Years of education: N/A     Occupational History     Not on file.     Social History Main Topics     Smoking status: Never Smoker     Smokeless tobacco: Never Used     Alcohol use Yes      Comment: red wine nightly     Drug use: No     Sexual activity: Yes     Partners: Female     Other Topics Concern     Parent/Sibling W/ Cabg, Mi Or Angioplasty Before 65f 55m? Yes     father     Social History Narrative       Current Outpatient Prescriptions   Medication     aspirin 81 MG tablet     B Complex-Biotin-FA (MULTI-B COMPLEX PO)     calcium-vitamin D (CALCIUM 600/VITAMIN D) 600-400 MG-UNIT per tablet     Coenzyme Q10 (COQ10) 100 MG CAPS     Ferrous Sulfate (IRON) 325 (65 FE) MG tablet     fish oil-omega-3 fatty acids (FISH OIL) 1000 MG capsule     Garlic 1000 MG CAPS  "    Glucosamine-Chondroitin (GLUCOSAMINE CHONDR COMPLEX PO)     lisinopril (PRINIVIL,ZESTRIL) 30 MG tablet     MAGNESIUM OXIDE PO     multivitamin (OCUVITE) TABS     No current facility-administered medications for this visit.         No Known Allergies    Review Of Systems:  Constitutional: denies fever, weight changes, chills, and night sweats.  Eyes: denies blurred or double vision  Ears/Nose/Throat: denies ear pain, nose problems, difficulty swallowing  Respiratory: denies shortness of breath, cough   Skin: denies rash, lesions; see hPI  Cardiovascular: denies chest pain, palpitations, edema  Gastrointestinal: denies abdominal pain, bloating, nausea, vomiting, early satiety  Genitourinary: denies difficulty with urination, blood in urine  Musculoskeletal:denies new muscle pain, bone pain  Neurologic: denies lightheadedness, headaches, numbness or tingling  Hematologic/Lymphatic/Immunologic: denies easy bruising, easy bleeding, lumps or bumps noted  Endocrine: Denies increased thirst    Physical Exam:  /60 (BP Location: Right arm, Patient Position: Chair, Cuff Size: Adult Regular)  Pulse 70  Temp 97.8  F (36.6  C) (Tympanic)  Ht 1.791 m (5' 10.5\")  Wt 74.8 kg (165 lb)  SpO2 98%  BMI 23.34 kg/m2    GENERAL APPEARANCE: Healthy, alert and in no acute distress.  HEENT: Normocephalic, Sclerae anicteric. Oropharynx without ulcers, lesions, or thrush.  NECK:  No asymmetry or masses, no thyromegaly.  LYMPHATICS: No palpable cervical, supraclavicular, axillary, or inguinal nodes   RESP: Lungs clear to auscultation bilaterally, respirations regular and easy  CARDIOVASCULAR: Regular rate and rhythm. Normal S1, S2; no murmur, gallop, or rub.  ABDOMEN: Soft, nontender. Bowel sounds auscultated all 4 quadrants. No palpable organomegaly or masses.  MUSCULOSKELETAL: Extremities without gross deformities noted. No edema of bilateral lower extremities.  SKIN: large skin tag tot he left side of his neck  NEURO: Alert " and oriented x 3.  Gait steady.  PSYCHIATRIC: Mentation and affect appear normal.  Mood appropriate.    Laboratory:  Results for orders placed or performed in visit on 07/18/18   CBC with platelets differential   Result Value Ref Range    WBC 97.1 (HH) 4.0 - 11.0 10e9/L    RBC Count 4.31 (L) 4.4 - 5.9 10e12/L    Hemoglobin 13.1 (L) 13.3 - 17.7 g/dL    Hematocrit 38.7 (L) 40.0 - 53.0 %    MCV 90 78 - 100 fl    MCH 30.4 26.5 - 33.0 pg    MCHC 33.9 31.5 - 36.5 g/dL    RDW 14.1 10.0 - 15.0 %    Platelet Count 137 (L) 150 - 450 10e9/L    Diff Method Manual Differential     % Neutrophils 6.0 %    % Lymphocytes 90.0 %    % Monocytes 2.0 %    % Eosinophils 1.0 %    % Basophils 1.0 %    Absolute Neutrophil 5.8 1.6 - 8.3 10e9/L    Absolute Lymphocytes 87.4 (H) 0.8 - 5.3 10e9/L    Absolute Monocytes 1.9 (H) 0.0 - 1.3 10e9/L    Absolute Eosinophils 1.0 (H) 0.0 - 0.7 10e9/L    Absolute Basophils 1.0 (H) 0.0 - 0.2 10e9/L    Reactive Lymphs Present    Comprehensive metabolic panel   Result Value Ref Range    Sodium 141 133 - 144 mmol/L    Potassium 5.2 3.4 - 5.3 mmol/L    Chloride 105 94 - 109 mmol/L    Carbon Dioxide 31 20 - 32 mmol/L    Anion Gap 5 3 - 14 mmol/L    Glucose 96 70 - 99 mg/dL    Urea Nitrogen 15 7 - 30 mg/dL    Creatinine 0.98 0.66 - 1.25 mg/dL    GFR Estimate 76 >60 mL/min/1.7m2    GFR Estimate If Black >90 >60 mL/min/1.7m2    Calcium 8.6 8.5 - 10.1 mg/dL    Bilirubin Total 0.8 0.2 - 1.3 mg/dL    Albumin 4.3 3.4 - 5.0 g/dL    Protein Total 6.9 6.8 - 8.8 g/dL    Alkaline Phosphatase 63 40 - 150 U/L    ALT 31 0 - 70 U/L    AST 24 0 - 45 U/L   Lactate Dehydrogenase   Result Value Ref Range    Lactate Dehydrogenase 338 (H) 85 - 227 U/L       Imaging Studies:  None for today      ASSESSMENT/PLAN:    #1 CLL: Stage II chronic lymphocytic leukemia. WBC and lymphocytes stable.  Platelets stable.  He is asymptomatic.  He will follow up in 3 months with a CBC, CMP and LDH.    #2  Anemia:  Hemoglobin just slightly under  normal.  Last colonoscopy was 8/2014 with tubular adenoma removed with next recommended in 5 years per his PCP.  Will obtain an IFOBT and recheck his hemoglobin again in 3 months with follow up.    #3  Skin tag:  I let him know that dermatology could take care of this for him but it may be 4-5 months before an appointment is available.  I recommended he see his PCP as he may be able to remove it for him.      I encouraged patient to call with any questions or concerns.    Nicole DEXTER, FNP-BC, AOCNP

## 2018-07-19 DIAGNOSIS — Z12.11 SPECIAL SCREENING FOR MALIGNANT NEOPLASMS, COLON: ICD-10-CM

## 2018-07-19 DIAGNOSIS — C91.10 CLL (CHRONIC LYMPHOCYTIC LEUKEMIA) (H): ICD-10-CM

## 2018-07-19 LAB — HEMOCCULT SP1 STL QL: POSITIVE

## 2018-07-19 PROCEDURE — 82274 ASSAY TEST FOR BLOOD FECAL: CPT | Performed by: NURSE PRACTITIONER

## 2018-07-19 ASSESSMENT — PATIENT HEALTH QUESTIONNAIRE - PHQ9: SUM OF ALL RESPONSES TO PHQ QUESTIONS 1-9: 0

## 2018-07-20 ENCOUNTER — TELEPHONE (OUTPATIENT)
Dept: INTERNAL MEDICINE | Facility: OTHER | Age: 68
End: 2018-07-20

## 2018-07-20 NOTE — TELEPHONE ENCOUNTER
2:48 PM    Reason for Call: OVERBOOK    Patient is having the following symptoms: med check // referral for a colonoscopy for 1 weeks.    The patient is requesting an appointment for sometime before end of July with .    Was an appointment offered for this call? No  If yes : Appointment type              Date    Preferred method for responding to this message: Telephone Call  What is your phone number ?[phone]    If we cannot reach you directly, may we leave a detailed response at the number you provided? Yes    Can this message wait until your PCP/provider returns, if unavailable today? Not applicable, PCP is in     Isaura Page

## 2018-07-23 ENCOUNTER — OFFICE VISIT (OUTPATIENT)
Dept: PEDIATRICS | Facility: OTHER | Age: 68
End: 2018-07-23
Attending: INTERNAL MEDICINE
Payer: MEDICARE

## 2018-07-23 VITALS
HEART RATE: 77 BPM | BODY MASS INDEX: 23.34 KG/M2 | DIASTOLIC BLOOD PRESSURE: 84 MMHG | TEMPERATURE: 98.6 F | OXYGEN SATURATION: 98 % | SYSTOLIC BLOOD PRESSURE: 134 MMHG | RESPIRATION RATE: 20 BRPM | WEIGHT: 165 LBS

## 2018-07-23 DIAGNOSIS — Z12.11 ENCOUNTER FOR SCREENING COLONOSCOPY: Primary | ICD-10-CM

## 2018-07-23 DIAGNOSIS — L91.8 SKIN TAG: ICD-10-CM

## 2018-07-23 DIAGNOSIS — I10 ESSENTIAL HYPERTENSION: ICD-10-CM

## 2018-07-23 DIAGNOSIS — R19.5 OCCULT BLOOD POSITIVE STOOL: ICD-10-CM

## 2018-07-23 PROCEDURE — 11200 RMVL SKIN TAGS UP TO&INC 15: CPT | Performed by: INTERNAL MEDICINE

## 2018-07-23 PROCEDURE — 11200 RMVL SKIN TAGS UP TO&INC 15: CPT

## 2018-07-23 PROCEDURE — 99213 OFFICE O/P EST LOW 20 MIN: CPT | Mod: 25 | Performed by: INTERNAL MEDICINE

## 2018-07-23 PROCEDURE — G0463 HOSPITAL OUTPT CLINIC VISIT: HCPCS

## 2018-07-23 PROCEDURE — G0463 HOSPITAL OUTPT CLINIC VISIT: HCPCS | Mod: 25

## 2018-07-23 RX ORDER — LISINOPRIL 30 MG/1
30 TABLET ORAL DAILY
Qty: 90 TABLET | Refills: 3 | Status: SHIPPED | OUTPATIENT
Start: 2018-07-23 | End: 2019-01-01

## 2018-07-23 ASSESSMENT — PAIN SCALES - GENERAL: PAINLEVEL: NO PAIN (0)

## 2018-07-23 NOTE — NURSING NOTE
"Chief Complaint   Patient presents with     Hypertension     Referral     colonoscopy       Initial /84 (BP Location: Right arm, Patient Position: Chair, Cuff Size: Adult Large)  Pulse 77  Temp 98.6  F (37  C) (Tympanic)  Resp 20  Wt 165 lb (74.8 kg)  SpO2 98%  BMI 23.34 kg/m2 Estimated body mass index is 23.34 kg/(m^2) as calculated from the following:    Height as of 7/18/18: 5' 10.5\" (1.791 m).    Weight as of this encounter: 165 lb (74.8 kg).  Medication Reconciliation: complete    Danni Stone LPN    "

## 2018-07-23 NOTE — PROGRESS NOTES
SUBJECTIVE:   Ulysses Purcell is a 68 year old male who presents to clinic today for the following health issues:      Hypertension Follow-up      Outpatient blood pressures are not being checked.    Low Salt Diet: no added salt      Amount of exercise or physical activity: 6-7 golf every day and anytime fitness 3  days/week for an average of 30-45 minutes    Problems taking medications regularly: No    Medication side effects: none    Diet: regular (no restrictions)      BP Readings from Last 6 Encounters:   07/23/18 134/84   07/18/18 130/60   04/17/18 137/80   02/19/18 121/82   11/29/17 155/88   10/16/17 140/80     -------------------------------------    Problem list and histories reviewed & adjusted, as indicated.  Additional history: as documented    Patient Active Problem List   Diagnosis     Essential hypertension     Abdominal bruit     CLL (chronic lymphocytic leukemia) (H)     Routine general medical examination at a health care facility     ACP (advance care planning)     S/P laparotomy     Past Surgical History:   Procedure Laterality Date     BACK SURGERY  1997    L4-L5 partial laminectomy     BIOPSY  06/08/2017    abdomen     COLONOSCOPY  02/04/2004    No polyps     COLONOSCOPY  8/4/2014    Procedure: COLONOSCOPY;  Surgeon: Meliza Morales MD;  Location: [facility]      LEFT HEART CATHETERIZATION  05/10/2005    Normal angiogram     LAMINECTOMY CERIVCAL POSTERIOR ONE LEVEL  1997     LAPAROSCOPY DIAGNOSTIC (GENERAL) N/A 6/8/2017    Procedure: LAPAROSCOPY DIAGNOSTIC (GENERAL);  Diagnostic Laparoscopy, Laparatomy Open resection of Mesenteric Lymph Node ;  Surgeon: Melba Goodrich MD;  Location:  OR       Social History   Substance Use Topics     Smoking status: Never Smoker     Smokeless tobacco: Never Used     Alcohol use Yes      Comment: red wine nightly     Family History   Problem Relation Age of Onset     HEART DISEASE Mother      stents the 2nd time, CABG     C.A.D. Mother      Coronary  Artery Disease Mother      Cerebrovascular Disease Father 58     HEART DISEASE Father      Hyperlipidemia Father      Unknown/Adopted Maternal Grandmother      Unknown/Adopted Maternal Grandfather      Unknown/Adopted Paternal Grandmother      Unknown/Adopted Paternal Grandfather      Hypertension Brother      Other Cancer Brother      Hypertension Brother      Other Cancer Brother      Cancer No family hx of      skin cancer     Diabetes No family hx of      Breast Cancer No family hx of      Colon Cancer No family hx of      Anesthesia Reaction No family hx of      Asthma No family hx of      Thyroid Disease No family hx of            Reviewed and updated as needed this visit by clinical staff  Tobacco  Allergies  Meds  Med Hx  Surg Hx  Fam Hx  Soc Hx      Reviewed and updated as needed this visit by Provider         ROS:  CONSTITUTIONAL: NEGATIVE for fever, chills, change in weight  RESP: NEGATIVE for significant cough or SOB  CV: NEGATIVE for chest pain, palpitations or peripheral edema  GI: NEGATIVE for nausea, abdominal pain, heartburn, or change in bowel habits  : NEGATIVE for frequency, dysuria, or hematuria  MUSCULOSKELETAL: NEGATIVE for significant arthralgias or myalgia  NEURO: NEGATIVE for weakness, dizziness or paresthesias    OBJECTIVE:     /84 (BP Location: Right arm, Patient Position: Chair, Cuff Size: Adult Large)  Pulse 77  Temp 98.6  F (37  C) (Tympanic)  Resp 20  Wt 165 lb (74.8 kg)  SpO2 98%  BMI 23.34 kg/m2  Body mass index is 23.34 kg/(m^2).  GENERAL: healthy, alert and no distress  NECK: no adenopathy, no asymmetry, masses, or scars and thyroid normal to palpation  NECK: no carotid bruits  RESP: lungs clear to auscultation - no rales, rhonchi or wheezes  CV: regular rate and rhythm, normal S1 S2, no S3 or S4, no murmur, click or rub, no peripheral edema and peripheral pulses strong  MS: no gross musculoskeletal defects noted, no edema  SKIN:4 mm skin tag over the left neck  with a 2 mm stalk.    Diagnostic Test Results:  Recent Labs   Lab Test  07/18/18   0812  04/17/18   0814   NA  141  138   POTASSIUM  5.2  5.1   CHLORIDE  105  104   CO2  31  29   ANIONGAP  5  5   GLC  96  97   BUN  15  14   CR  0.98  0.92   AXEL  8.6  8.7         ASSESSMENT/PLAN:   (Z12.11) Encounter for screening colonoscopy  (primary encounter diagnosis)  Comment: He has a history of tubular adenoma and current positive occult blood.  Plan:   GENERAL SURG ADULT REFERRAL    (R19.5) Occult blood positive stool  Comment:   Plan:   GENERAL SURG ADULT REFERRAL    (I10) Essential hypertension  Comment: Stable at goal.  His renal function is normal  Plan:  Continue lisinopril (PRINIVIL,ZESTRIL) 30 MG tablet    (L91.8) Skin tag  Comment: Discussed removal options including excision of the lesion with suture placement o lesion vs cryotherapy with risk and copnveniece and he chose cryotherapy as he wanted the lesion remove  Plan:   Cryotherapy.  Wound care instructions provided.  Patient was instructed to be alert for any signs of cutaneous infection.         Procedure note:      The lesion(s) is/are located on the neck, number 1 and measures 0.4cm He The patient reports the lesion is itching and denies other significant symptoms on ROS. Medications reviewed.    Pause for the cause has been completed prior to the prceedure.   1. Ulysses was identified by both name and date of birth   2. The correct site was identified   3. Site was marked by provider    4. Written informed consent correct and signed or verbal authorization  to proceed was obtained   5. Verifed necessary supplies, equipment, and diagnostics are available    6. Time out was performed immediately prior to procedure    Objective: The lesion(s) is/are of the above mentioned size and location and is/are typical. The area was prepped and appropriately anesthetized. Using the usual technique, cryotherapy with liquid nitrogen x 3 was performed. An appropriate  dressing was applied. The procedure was well tolerated and without complications.   rn..         FUTURE APPOINTMENTS:       - Follow-up visit in 1 year for an annual exam.    Juan Kenney DO,   [facility]

## 2018-07-23 NOTE — MR AVS SNAPSHOT
After Visit Summary   7/23/2018    Ulyssse Purcell    MRN: [id number]           Patient Information     Date Of Birth          1950        Visit Information        Provider Department      7/23/2018 11:40 AM Juan Kenney, DO [facility]        Today's Diagnoses     Encounter for screening colonoscopy    -  1    Essential hypertension        Occult blood positive stool        Skin tag           Follow-ups after your visit        Additional Services     GENERAL SURG ADULT REFERRAL       Your provider has referred you to: [facility]: [facility] [place] [phone]   [email or web address]    Please be aware that coverage of these services is subject to the terms and limitations of your health insurance plan.  Call member services at your health plan with any benefit or coverage questions.      Please bring the following with you to your appointment:    (1) Any X-Rays, CTs or MRIs which have been performed.  Contact the facility where they were done to arrange for  prior to your scheduled appointment.   (2) List of current medications   (3) This referral request   (4) Any documents/labs given to you for this referral                  Follow-up notes from your care team     Return if symptoms worsen or fail to improve.      Your next 10 appointments already scheduled     Oct 24, 2018  8:45 AM CDT   LAB with [facility] LAB   [facility] ([facility] )    [address] [place] [address]   [phone]            Oct 24, 2018  9:20 AM CDT   (Arrive by 9:05 AM)   Return Visit with Nicole D Pettinelli, NP   [facility] ([facility] )    [address] [place] [address]   [phone]              Who to contact     If you have questions or need follow up information about today's clinic visit or your schedule please contact [facility]  directly at [phone].  Normal or non-critical lab and imaging results will be communicated to you by Ultiushart, letter or phone within 4 business days after the clinic has received the results. If you do not hear from us within 7 days, please contact the clinic through Ultiushart or phone. If you have a critical or abnormal lab result, we will notify you by phone as soon as possible.  Submit refill requests through Allostatix or call your pharmacy and they will forward the refill request to us. Please allow 3 business days for your refill to be completed.          Additional Information About Your Visit        Ultiushart Information     Allostatix gives you secure access to your electronic health record. If you see a primary care provider, you can also send messages to your care team and make appointments. If you have questions, please call your primary care clinic.  If you do not have a primary care provider, please call [phone] and they will assist you.        Care EveryWhere ID     This is your Care EveryWhere ID. This could be used by other organizations to access your [place] medical records  [id number]        Your Vitals Were     Pulse Temperature Respirations Pulse Oximetry BMI (Body Mass Index)       77 98.6  F (37  C) (Tympanic) 20 98% 23.34 kg/m2        Blood Pressure from Last 3 Encounters:   07/23/18 134/84   07/18/18 130/60   04/17/18 137/80    Weight from Last 3 Encounters:   07/23/18 165 lb (74.8 kg)   07/18/18 165 lb (74.8 kg)   04/17/18 168 lb 6.4 oz (76.4 kg)              We Performed the Following     GENERAL SURG ADULT REFERRAL          Today's Medication Changes          These changes are accurate as of 7/23/18 12:27 PM.  If you have any questions, ask your nurse or doctor.               These medicines have changed or have updated prescriptions.        Dose/Directions    lisinopril 30 MG tablet   Commonly known as:  PRINIVIL,ZESTRIL   This may have changed:  See the new instructions.   Used for:   Essential hypertension   Changed by:  Juan Kenney DO        Dose:  30 mg   Take 1 tablet (30 mg) by mouth daily   Quantity:  90 tablet   Refills:  3            Where to get your medicines      These medications were sent to [id number] - [place] - [address]  [address] [facility] [address]     Phone:  [phone]     lisinopril 30 MG tablet                Primary Care Provider Office Phone # Fax #    Juan Parker DO [place] [phone] [address] [address]        Equal Access to Services     [facility]: Hadii aad ku hadasho Soomaali, waaxda luqadaha, qaybta kaalmada adeegyada, howard duncan hayaan marquez jones . So [facility] [phone].    ATENCIÓN: Si habla español, tiene a zamora disposición servicios gratuitos de asistencia lingüística. Llame al [phone].    We comply with applicable federal civil rights laws and [place] laws. We do not discriminate on the basis of race, color, national origin, age, disability, sex, sexual orientation, or gender identity.            Thank you!     Thank you for choosing [facility]  for your care. Our goal is always to provide you with excellent care. Hearing back from our patients is one way we can continue to improve our services. Please take a few minutes to complete the written survey that you may receive in the mail after your visit with us. Thank you!             Your Updated Medication List - Protect others around you: Learn how to safely use, store and throw away your medicines at www.disposemymeds.org.          This list is accurate as of 7/23/18 12:27 PM.  Always use your most recent med list.                   Brand Name Dispense Instructions for use Diagnosis    aspirin 81 MG tablet      Take  by mouth daily.        calcium 600/vitamin D 600-400 MG-UNIT per tablet   Generic drug:  calcium-vitamin D      Take 1 tablet by mouth daily.        CoQ10 100 MG Caps       Take  by mouth daily.        fish oil-omega-3 fatty acids 1000 MG capsule      Take 1 capsule by mouth daily.        Garlic 1000 MG Caps      Take  by mouth daily.        GLUCOSAMINE CHONDR COMPLEX PO      Take  by mouth daily.        iron 325 (65 Fe) MG tablet      Take 1 tablet by mouth daily.        lisinopril 30 MG tablet    PRINIVIL,ZESTRIL    90 tablet    Take 1 tablet (30 mg) by mouth daily    Essential hypertension       MAGNESIUM OXIDE PO      Take 400 mg by mouth    CLL (chronic lymphocytic leukemia) (H)       MULTI-B COMPLEX PO      Take  by mouth daily.        multivitamin Tabs tablet      Take 1 tablet by mouth daily

## 2018-08-07 NOTE — PATIENT INSTRUCTIONS
Guide to your Colonoscopy with Suprep       Date of Procedure 8/13/18 with     Admit time: Surgery Department will call you the day before your procedure by 5pm with your admit time. If your surgery is on Monday, please expect a call on Friday.    If we were unable to reach you before 5PM, you may call admitting at [phone] or toll free at [phone]    Please call the Registered Nurse in Surgery Education at [phone] or toll free at [phone] one to two weeks before your procedure and have an allergy and medication list ready     Call the surgery nurse or your primary care provider if you should become ill within 1 week of your procedure and we will reschedule it when you are healthy. This includes signs or symptoms of a cold or the flu. This can include fever, chills, sore throat, cough, chest congestions, productive cough, runny nose.        YOUR UPCOMING COLONOSCOPY    At [facility], we want to make sure that your colonoscopy is as pleasant as possible. This guide is designed to answer any questions you might have and to walk you through the preparations you will need to make before your procedure.    Should you have additional questions, please feel free to contact us. Contact numbers are listed below. Thank you for choosing [facility].    Important Numbers    Clinic Health Unit Coordinator: [phone]  Clinic Nurse: [phone] (or [phone]; [phone])  Surgery Education Nurse: [phone] or toll free [phone]    All nursing questions or concerns can be directed to the clinic or surgery education nurse    If you have a scheduling or appointment question, or need to postpone your procedure, please call the Health Unit Coordinator between 8am and 4pm Monday through Friday.    After hours or on weekends, please call [phone] to postpone.       EKG needed in clinic X-ray today.          COLONOSCOPY PREP    7 DAYS BEFORE THE EXAM:     Do  not take Aspirin or other NSAIDS (Ibuprofen, Motrin, Aleve, Celebrex, Naproxen, etc) 7 days before your surgery. Tylenol is fine.    If you are prescribed blood thinners (Aspirin, Coumadin/Warfarin, Plavix, etc) talk to your provider.    Stop taking fiber supplements, vitamins, iron or multivitamins that contain iron.    Do not eat any nuts or seeds.    If you are a diabetic and take medications to control your blood sugar, follow the special instructions listed or talk to your provider.     Arrange transportation with a family member or friend to drive you home and have an adult available to stay with you for the next 4 hours when you arrive home for your safety. If you need to take a taxi or the bus, you MUST have a responsible adult to ride with you OR YOUR PROCEDURE WILL BE CANCELLED. It is recommended that you DO NOT DRIVE for the next 24 hours after receiving anesthesia.      prescriptions at your pharmacy as soon as possible. If it has been more than one week since your appointment was scheduled, please call your pharmacy to verify it is still ready for .     Call the Surgery Education Nurse if you should become ill within 1 week of your procedure and we will reschedule it when you are healthy. This includes sings or symptoms of a cold or the flu. This can include fever, chills, sore throat, cough, chest congestions, productive cough, runny nose.     2 DAYS BEFORE THE EXAM:   Begin a low fiber diet. No raw fruits or vegatables, whole grains, nuts, seeds, popcorn, or other high-fiber foods. No bulking agents (metamucil, Fibercon, etc) and no Olestra (fat substitute).     Drink at least 4-6 large glasses of sports drink each day (not red or purple).    1 DAY BEFORE THE EXAM:    DO NOT EAT ANY SOLID FOOD OR MILK PRODUCTS AFTER 12:00 AM (MIDNIGHT).  Drink only clear liquids for breakfast, lunch and dinner. (No red or purple colors as these colors can be mistaken for blood.)    Clear liquids include  water, flavored water, coconut water, juices without pulp, soft drinks, broth, bouillon, black coffee without cream, tea, Mac-Aid, Propel, Gatorade, Clear nutrition drinks (Resource Breeze, Ensure Active Protein peach flavor) Jell-O and popsicles.     Follow the instructions of your colon preparation.    No red or purple colors, milk products, or alcohol.        AT 6:00 PM THE EVENING PRIOR TO PROCEDURE:  Pour one 6 ounce bottle of SUPREP liquid into the mixing container.  Add cool drinking water to the 16 ounce line on the container and mix.   Note: Be sure to dilute SUPREP before you drink it.  Drink ALL the liquid in the container.    You must drink 2 or more 16 ounce containers of water over the next hour.  Stay near a toilet while using this medication.      6 HOURS PRIOR TO THE EXAM:  Pour the 2nd 6 ounce bottle of SUPREP liquid into the mixing container.  Add cool drinking water to the 16 ounce line on the container and mix.   Note: Be sure to dilute SUPREP before you drink it.  Drink ALL the liquid in the container.    You must drink 2 or more 16 ounce containers of water over the next hour.  You should be done with with prep 4 hours before the exam.    You may continue clear liquids until 3 hours prior to exam.     If you must take medication, take it with a sip of water.  Do not take diabetes medicine by mouth until after your exam.  If you have asthma, bring your inhaler to surgery.      DAY OF COLONOSCOPY PROCEDURE:     You many have clear liquids up until 3 hours before you check in at admitting.  Wear comfortable clothes. No jewelry, body piercings, make-up, nail polish, hair spray, lotions, perfumes or colognes. Shower before you arrive.  Take blood pressure and heart medications as usual with a sip of water.  [place] in Admitting through the [facility].  You must have a  with you and and adult available to stay with your for 4 hours at home. The medicine used in this test will make you  "sleepy. If you do not have someone, please reschedule or your test will be cancelled.  It is recommended that you do not drive for 24 hours after your test. Do not operate power equipment, drink alcoholic beverages, make important decisions or sign legal documents.     COLONOSCOPY FREQUENTLY ASKED QUESTIONS    What is a colonoscopy?    A colonoscopy is a test to look at the lining of your large intestine. The purpose of the exam is to check for abnormalities including growths called \"polyps\" that can lead to serious disease. A flexibles scope is inserted into your rectum by the doctor to examine your large intestine.    What are polyps?  Polyps are abnormal growths on the lining of the colon. Most polyps are not cancerous, but some polyps have the potential to turn into cancer with time. Polyps can also bleed. For these reasons, most polyps are removed during a colonoscopy and sent to the laboratory for microscopic examination.    What preparation is needed?  The colon must be completely clean for the procedure to be performed. You may be given one or two different prep solutions to cleanse your bowel. You will also need to follow a clear liquid diet the day before your procedure.    What happens after the procedure?  After your procedure is complete, you will be taken back to your day surgery room where you will be monitored for approximately 1 hour. You can expect to feel drowsy for several hours afterward. You may experience some cramping or bloating due to the air introduced into your colon during the exam. You will not be able to drive or operate machinery the rest of the day. You will be given written discharge instructions and appropriate learning material before you go home. You must have an adult to stay at home with you for the next 4 hours after you leave the hospital for your safety.    When will I find out the results of my test?  Your surgeon will talk to you and your designated  before you leave " and usually the preliminary results can be given to you at that time. If a biopsy was taken during your procedure, it will be sent to the laboratory for examination. Results usually take one week. You will be contacted by phone or by letter with results.      TIPS FOR COLON CLEANSING BEFORE YOUR COLONOSCOPY    To get accurate results from your exam, your colon must be completely clean and empty. Please follow your doctor's instructions. If you do not, you may need to repeat both the exam and colon-cleansing process.    The medicine you take may cause bloating, nausea and other discomfort. Follow these tips to make the process as easy as possible:     You may use alcohol-free baby wipes to ease anal irritation. You may also use Vaseline to help protect the skin. Other options include Tucks wipes, hemorrhoid treatments and hydrocortisone cream.     You may wish to squeeze some lemon juice into your preparation or add a packet of Crystal Light lemon-lime or ice tea flavor. (remember to not use red or purple)    To chill the solution, put it in your refrigerator or set it in a bowl of ice. Do not add ice in your drinking glass. You may remove the colon preparation from the refrigerator 15-30 minutes before drinking.    Quickly drink one whole glass every 5 to 10 minutes. It may help to use a timer. If the liquid is too salty, use a straw.    Stay near a toilet!    You will have diarrhea (loose watery stools) and may also have chills. Dress for comfort.    Expect to feel discomfort until the stool clears from your colon. This usually takes about 2 to 4 hours.    Even when you are sitting on the toilet, keep drinking a glass of solution every 10-15 minutes.    If you have nausea or vomiting, rinse your mouth with water. Take a break for 15 to 30 minutes, and then keep drinking the solution.    Some people find it helpful to suck on a wedge of lemon or lime. You may also try sucking on hard candy (not red or purple) or  washing your mouth out with water, clear soda or mouthwash.    If you followed your doctor's orders and your stool is a clear or yellow liquid, you are ready for the exam.    If you are not sure if your colon is clean, please call your clinic and ask to speak to a nurse.

## 2018-08-07 NOTE — NURSING NOTE
"Chief Complaint   Patient presents with     Consult     Regarding colon consult per [facility] due to blood in stool for about 3 weeks, denies abdominal pain.       Initial /68  Pulse 68  Temp 98.1  F (36.7  C) (Tympanic)  Resp 16  Ht 5' 10.5\" (1.791 m)  Wt 166 lb (75.3 kg)  SpO2 (!) 68%  BMI 23.48 kg/m2 Estimated body mass index is 23.48 kg/(m^2) as calculated from the following:    Height as of this encounter: 5' 10.5\" (1.791 m).    Weight as of this encounter: 166 lb (75.3 kg).  Medication Reconciliation: complete    Sophie Anderson LPN    "

## 2018-08-07 NOTE — PROGRESS NOTES
[facility] Surgery Consultation    CC:  Positive fecal occult blood    HPI:  This 68 year old year old male is seen at the request of [facility] for evaluation of positive fecal occult blood.  The history is obtained from the patient, and reviewing the medical record.  He is good medical historian. Mr. Purcell states that he has been following with oncology for his CLL and he underwent a fecal occult blood as his hemoglobin was low. This came back as positive and he was referred to general surgery for evaluation. He says he has not had any melena, hematochezia, abdominal pain, bloating or cramping. He has no upper GI symptoms. He has no family history of colon cancer.    Past Medical History:   Diagnosis Date     Chronic lymphocytic leukemia (CLL), T-cell (H)     white counts in the 34,000 to 40,000      Colonic polyp 08/07/2014    tubular adenoma     Elevated blood pressure reading without diagnosis of hypertension 10/16/2002     Fracture of metatarsal bone(s), closed 10/29/2001    right 5th metatarsal bone     Hypertension      Other orthopedic aftercare(V54.89) 11/08/2001     Pericarditis     2005     Routine general medical examination at [facility] 12/01/2000     Special screening for malignant neoplasms, colon 02/04/2004     Tubular adenoma of colon 02/2014    single        Past Surgical History:   Procedure Laterality Date     BACK SURGERY  1997    L4-L5 partial laminectomy     BIOPSY  06/08/2017    abdomen     COLONOSCOPY  02/04/2004    No polyps     COLONOSCOPY  8/4/2014    Procedure: COLONOSCOPY;  Surgeon: Meliza Morales MD;  Location: [facility] OR      LEFT HEART CATHETERIZATION  05/10/2005    Normal angiogram     LAMINECTOMY CERIVCAL POSTERIOR ONE LEVEL  1997     LAPAROSCOPY DIAGNOSTIC (GENERAL) N/A 6/8/2017    Procedure: LAPAROSCOPY DIAGNOSTIC (GENERAL);  Diagnostic Laparoscopy, Laparatomy Open resection of Mesenteric Lymph Node ;  Surgeon: Melba Goodrich MD;  Location: [facility] OR       Pt  denied problems with bleeding or anesthesia    Prior to Admission medications    Medication Sig Start Date End Date Taking? Authorizing Provider   aspirin 81 MG tablet Take  by mouth daily.   Yes Reported, Patient   B Complex-Biotin-FA (MULTI-B COMPLEX PO) Take  by mouth daily.   Yes Reported, Patient   calcium-vitamin D (CALCIUM 600/VITAMIN D) 600-400 MG-UNIT per tablet Take 1 tablet by mouth daily.   Yes Reported, Patient   Coenzyme Q10 (COQ10) 100 MG CAPS Take  by mouth daily.   Yes Reported, Patient   Ferrous Sulfate (IRON) 325 (65 FE) MG tablet Take 1 tablet by mouth daily.   Yes Reported, Patient   fish oil-omega-3 fatty acids (FISH OIL) 1000 MG capsule Take 1 capsule by mouth daily.    Yes Reported, Patient   Garlic 1000 MG CAPS Take  by mouth daily.   Yes Reported, Patient   Glucosamine-Chondroitin (GLUCOSAMINE CHONDR COMPLEX PO) Take  by mouth daily.   Yes Reported, Patient   lisinopril (PRINIVIL,ZESTRIL) 30 MG tablet Take 1 tablet (30 mg) by mouth daily 7/23/18  Yes Juan Kenney,    MAGNESIUM OXIDE PO Take 400 mg by mouth   Yes Reported, Patient   multivitamin (OCUVITE) TABS Take 1 tablet by mouth daily   Yes Reported, Patient        No Known Allergies      HABITS:    Social History   Substance Use Topics     Smoking status: Never Smoker     Smokeless tobacco: Never Used     Alcohol use Yes      Comment: red wine nightly     No mood altering drug use.    Family History   Problem Relation Age of Onset     HEART DISEASE Mother      stents the 2nd time, CABG     C.A.D. Mother      Coronary Artery Disease Mother      Cerebrovascular Disease Father 58     HEART DISEASE Father      Hyperlipidemia Father      Unknown/Adopted Maternal Grandmother      Unknown/Adopted Maternal Grandfather      Unknown/Adopted Paternal Grandmother      Unknown/Adopted Paternal Grandfather      Hypertension Brother      Other Cancer Brother      Hypertension Brother      Other Cancer Brother      Cancer No family hx of   "    skin cancer     Diabetes No family hx of      Breast Cancer No family hx of      Colon Cancer No family hx of      Anesthesia Reaction No family hx of      Asthma No family hx of      Thyroid Disease No family hx of        REVIEW OF SYSTEMS:  Ten point review of systems negative except those mentioned in the HPI.     The patient denies sleep apnea, latex allergies or MRSA    OBJECTIVE:    /68  Pulse 68  Temp 98.1  F (36.7  C) (Tympanic)  Resp 16  Ht 5' 10.5\" (1.791 m)  Wt 166 lb (75.3 kg)  SpO2 (!) 68%  BMI 23.48 kg/m2    GENERAL: Generally appears well, in no distress with appropriate affect.  HEENT:   Sclerae anicteric - No cervical, supra/infraclavicular lymphadenopathy, no thyroid masses  Respiratory:  Lungs clear to ausculation bilaterally with good air excursion  Cardiovascular:  Regular Rate and Rhythm with no murmurs gallops or rubs, normal   Abdomen: soft, NT/ND  :  deferred  Extremities:  Extremities normal. No deformities, edema, or skin discoloration.  Skin:  no suspicious lesions or rashes  Neurological: grossly intact    Psych:  Alert, oriented, affect appropriate with normal decision making ability.      IMPRESSION:  69 yo male with positive fecal occult blood    PLAN:  A detailed description of the [place] Preventive Task Force development of colorectal cancer screening was had. I described the pathology of the adenoma to carcinoma progression and its genetic changes that occur. I discussed how with colorectal cancer screening by endoscopic surveillance we are able to identify potential malignancies and remove them before they progress along the adenoma to carcinoma pathway. The risks for colon cancer progression were discussed including first degree relatives with colon cancer, inflammatory bowel disease, smoking, obesity, and diet. I then discussed how there are certain attributes which can decrease the risk of colon cancer such as a healthy diet and physical activity. The " patient understood the adenoma to carcinoma sequence, the reasoning for screening at specific intervals, and risk factor modification. I then described the technical portion of the procedure.    The indications, risks, benefits and technical aspects of whole colon colonoscopy were outlined with risks including, but not limited to, perforation, bleeding and inability to visualize entire colon.  Management of each was reviewed.  The need of mechanical preparation of the colon was reviewed along with the use of monitored anesthetic care.  The patient's questions were asked and answered.  Scheduled first available date.        Thank you for allowing me to participate in the care of your patient.       Ministerio Henry MD    8/7/2018  1:39 PM    cc:  Juan Kenney

## 2018-08-07 NOTE — NURSING NOTE
"Chief Complaint   Patient presents with     Consult     Regarding colon consult per [facility] due to blood in stool for about 3 weeks, denies abdominal pain.       Initial /68  Pulse 68  Temp 98.1  F (36.7  C) (Tympanic)  Resp 16  Ht 5' 10.5\" (1.791 m)  Wt 166 lb (75.3 kg)  SpO2 98%  BMI 23.48 kg/m2 Estimated body mass index is 23.48 kg/(m^2) as calculated from the following:    Height as of this encounter: 5' 10.5\" (1.791 m).    Weight as of this encounter: 166 lb (75.3 kg).  Medication Reconciliation: complete    ROSA MARIA LEE LPN    "

## 2018-08-07 NOTE — MR AVS SNAPSHOT
After Visit Summary   8/7/2018    Ulysses Purcell    MRN: [id number]           Patient Information     Date Of Birth          1950        Visit Information        Provider Department      8/7/2018 1:30 PM Ministerio Henry MD [facility]        Today's Diagnoses     Special screening for malignant neoplasms, colon    -  1    Essential hypertension          Care Instructions     Guide to your Colonoscopy with Suprep       Date of Procedure 8/13/18 with     Admit time: Surgery Department will call you the day before your procedure by 5pm with your admit time. If your surgery is on Monday, please expect a call on Friday.    If we were unable to reach you before 5PM, you may call admitting at [phone] or toll free at [phone]    Please call the Registered Nurse in Surgery Education at [phone] or toll free at [phone] one to two weeks before your procedure and have an allergy and medication list ready     Call the surgery nurse or your primary care provider if you should become ill within 1 week of your procedure and we will reschedule it when you are healthy. This includes signs or symptoms of a cold or the flu. This can include fever, chills, sore throat, cough, chest congestions, productive cough, runny nose.        YOUR UPCOMING COLONOSCOPY    At [facility], we want to make sure that your colonoscopy is as pleasant as possible. This guide is designed to answer any questions you might have and to walk you through the preparations you will need to make before your procedure.    Should you have additional questions, please feel free to contact us. Contact numbers are listed below. Thank you for choosing [facility].    Important Numbers    Clinic Health Unit Coordinator: [phone]  Clinic Nurse: [phone] (or [phone]; [phone])  Surgery Education Nurse: [phone] or toll free [phone]    All nursing  questions or concerns can be directed to the clinic or surgery education nurse    If you have a scheduling or appointment question, or need to postpone your procedure, please call the Health Unit Coordinator between 8am and 4pm Monday through Friday.    After hours or on weekends, please call [phone] to postpone.       EKG needed in clinic X-ray today.          COLONOSCOPY PREP    7 DAYS BEFORE THE EXAM:     Do not take Aspirin or other NSAIDS (Ibuprofen, Motrin, Aleve, Celebrex, Naproxen, etc) 7 days before your surgery. Tylenol is fine.    If you are prescribed blood thinners (Aspirin, Coumadin/Warfarin, Plavix, etc) talk to your provider.    Stop taking fiber supplements, vitamins, iron or multivitamins that contain iron.    Do not eat any nuts or seeds.    If you are a diabetic and take medications to control your blood sugar, follow the special instructions listed or talk to your provider.     Arrange transportation with a family member or friend to drive you home and have an adult available to stay with you for the next 4 hours when you arrive home for your safety. If you need to take a taxi or the bus, you MUST have a responsible adult to ride with you OR YOUR PROCEDURE WILL BE CANCELLED. It is recommended that you DO NOT DRIVE for the next 24 hours after receiving anesthesia.      prescriptions at your pharmacy as soon as possible. If it has been more than one week since your appointment was scheduled, please call your pharmacy to verify it is still ready for .     Call the Surgery Education Nurse if you should become ill within 1 week of your procedure and we will reschedule it when you are healthy. This includes sings or symptoms of a cold or the flu. This can include fever, chills, sore throat, cough, chest congestions, productive cough, runny nose.     2 DAYS BEFORE THE EXAM:   Begin a low fiber diet. No raw fruits or vegatables, whole grains, nuts, seeds, popcorn, or other high-fiber  foods. No bulking agents (metamucil, Fibercon, etc) and no Olestra (fat substitute).     Drink at least 4-6 large glasses of sports drink each day (not red or purple).    1 DAY BEFORE THE EXAM:    DO NOT EAT ANY SOLID FOOD OR MILK PRODUCTS AFTER 12:00 AM (MIDNIGHT).  Drink only clear liquids for breakfast, lunch and dinner. (No red or purple colors as these colors can be mistaken for blood.)    Clear liquids include water, flavored water, coconut water, juices without pulp, soft drinks, broth, bouillon, black coffee without cream, tea, Mac-Aid, Propel, Gatorade, Clear nutrition drinks (Resource Breeze, Ensure Active Protein peach flavor) Jell-O and popsicles.     Follow the instructions of your colon preparation.    No red or purple colors, milk products, or alcohol.        AT 6:00 PM THE EVENING PRIOR TO PROCEDURE:  Pour one 6 ounce bottle of SUPREP liquid into the mixing container.  Add cool drinking water to the 16 ounce line on the container and mix.   Note: Be sure to dilute SUPREP before you drink it.  Drink ALL the liquid in the container.    You must drink 2 or more 16 ounce containers of water over the next hour.  Stay near a toilet while using this medication.      6 HOURS PRIOR TO THE EXAM:  Pour the 2nd 6 ounce bottle of SUPREP liquid into the mixing container.  Add cool drinking water to the 16 ounce line on the container and mix.   Note: Be sure to dilute SUPREP before you drink it.  Drink ALL the liquid in the container.    You must drink 2 or more 16 ounce containers of water over the next hour.  You should be done with with prep 4 hours before the exam.    You may continue clear liquids until 3 hours prior to exam.     If you must take medication, take it with a sip of water.  Do not take diabetes medicine by mouth until after your exam.  If you have asthma, bring your inhaler to surgery.      DAY OF COLONOSCOPY PROCEDURE:     You many have clear liquids up until 3 hours before you check in at  "admitting.  Wear comfortable clothes. No jewelry, body piercings, make-up, nail polish, hair spray, lotions, perfumes or colognes. Shower before you arrive.  Take blood pressure and heart medications as usual with a sip of water.  [place] in Admitting through the [facility].  You must have a  with you and and adult available to stay with your for 4 hours at home. The medicine used in this test will make you sleepy. If you do not have someone, please reschedule or your test will be cancelled.  It is recommended that you do not drive for 24 hours after your test. Do not operate power equipment, drink alcoholic beverages, make important decisions or sign legal documents.     COLONOSCOPY FREQUENTLY ASKED QUESTIONS    What is a colonoscopy?    A colonoscopy is a test to look at the lining of your large intestine. The purpose of the exam is to check for abnormalities including growths called \"polyps\" that can lead to serious disease. A flexibles scope is inserted into your rectum by the doctor to examine your large intestine.    What are polyps?  Polyps are abnormal growths on the lining of the colon. Most polyps are not cancerous, but some polyps have the potential to turn into cancer with time. Polyps can also bleed. For these reasons, most polyps are removed during a colonoscopy and sent to the laboratory for microscopic examination.    What preparation is needed?  The colon must be completely clean for the procedure to be performed. You may be given one or two different prep solutions to cleanse your bowel. You will also need to follow a clear liquid diet the day before your procedure.    What happens after the procedure?  After your procedure is complete, you will be taken back to your day surgery room where you will be monitored for approximately 1 hour. You can expect to feel drowsy for several hours afterward. You may experience some cramping or bloating due to the air introduced into your colon during " the exam. You will not be able to drive or operate machinery the rest of the day. You will be given written discharge instructions and appropriate learning material before you go home. You must have an adult to stay at home with you for the next 4 hours after you leave the hospital for your safety.    When will I find out the results of my test?  Your surgeon will talk to you and your designated  before you leave and usually the preliminary results can be given to you at that time. If a biopsy was taken during your procedure, it will be sent to the laboratory for examination. Results usually take one week. You will be contacted by phone or by letter with results.      TIPS FOR COLON CLEANSING BEFORE YOUR COLONOSCOPY    To get accurate results from your exam, your colon must be completely clean and empty. Please follow your doctor's instructions. If you do not, you may need to repeat both the exam and colon-cleansing process.    The medicine you take may cause bloating, nausea and other discomfort. Follow these tips to make the process as easy as possible:     You may use alcohol-free baby wipes to ease anal irritation. You may also use Vaseline to help protect the skin. Other options include Tucks wipes, hemorrhoid treatments and hydrocortisone cream.     You may wish to squeeze some lemon juice into your preparation or add a packet of Crystal Light lemon-lime or ice tea flavor. (remember to not use red or purple)    To chill the solution, put it in your refrigerator or set it in a bowl of ice. Do not add ice in your drinking glass. You may remove the colon preparation from the refrigerator 15-30 minutes before drinking.    Quickly drink one whole glass every 5 to 10 minutes. It may help to use a timer. If the liquid is too salty, use a straw.    Stay near a toilet!    You will have diarrhea (loose watery stools) and may also have chills. Dress for comfort.    Expect to feel discomfort until the stool clears  from your colon. This usually takes about 2 to 4 hours.    Even when you are sitting on the toilet, keep drinking a glass of solution every 10-15 minutes.    If you have nausea or vomiting, rinse your mouth with water. Take a break for 15 to 30 minutes, and then keep drinking the solution.    Some people find it helpful to suck on a wedge of lemon or lime. You may also try sucking on hard candy (not red or purple) or washing your mouth out with water, clear soda or mouthwash.    If you followed your doctor's orders and your stool is a clear or yellow liquid, you are ready for the exam.    If you are not sure if your colon is clean, please call your clinic and ask to speak to a nurse.                 Follow-ups after your visit        Your next 10 appointments already scheduled     Oct 24, 2018  8:45 AM CDT   LAB with  LAB   [facility] ([facility] )    [address] [address]   [phone]            Oct 24, 2018  9:20 AM CDT   (Arrive by 9:05 AM)   Return Visit with Nicole Ga NP   [facility] ([facility] )    [address] [address]   [phone]              Who to contact     If you have questions or need follow up information about today's clinic visit or your schedule please contact [facility] directly at [phone].  Normal or non-critical lab and imaging results will be communicated to you by MyChart, letter or phone within 4 business days after the clinic has received the results. If you do not hear from us within 7 days, please contact the clinic through MyChart or phone. If you have a critical or abnormal lab result, we will notify you by phone as soon as possible.  Submit refill requests through [facility] or call your pharmacy and they will forward the refill request to us. Please allow 3 business days for your refill to be completed.          Additional Information About Your  "Visit        MyChart Information     [employer] gives you secure access to your electronic health record. If you see a primary care provider, you can also send messages to your care team and make appointments. If you have questions, please call your primary care clinic.  If you do not have a primary care provider, please call [phone] and they will assist you.        Care EveryWhere ID     This is your Care EveryWhere ID. This could be used by other organizations to access your [facility] medical records  [id number]        Your Vitals Were     Pulse Temperature Respirations Height Pulse Oximetry BMI (Body Mass Index)    68 98.1  F (36.7  C) (Tympanic) 16 5' 10.5\" (1.791 m) 68% 23.48 kg/m2       Blood Pressure from Last 3 Encounters:   08/07/18 132/68   07/23/18 134/84   07/18/18 130/60    Weight from Last 3 Encounters:   08/07/18 166 lb (75.3 kg)   07/23/18 165 lb (74.8 kg)   07/18/18 165 lb (74.8 kg)              We Performed the Following     EKG 12-lead complete w/read - (Clinic Performed)     SURGERY ORDER          Today's Medication Changes          These changes are accurate as of 8/7/18  1:44 PM.  If you have any questions, ask your nurse or doctor.               Start taking these medicines.        Dose/Directions    Na Sulfate-K Sulfate-Mg Sulf solution   Commonly known as:  SUPREP BOWEL PREP KIT   Used for:  Special screening for malignant neoplasms, colon   Started by:  Ministerio Henry MD        Dose:  1 Bottle   Take 177 mLs (1 Bottle) by mouth See Admin Instructions   Quantity:  2 Bottle   Refills:  0            Where to get your medicines      These medications were sent to [id number] - [place], [place] - [address]  [address] [place] [address]     Phone:  [phone]     Na Sulfate-K Sulfate-Mg Sulf solution                Primary Care Provider Office Phone # Fax #    Juan Parker DO [facility] [phone]       [address] [place] [address]  [place] [address]      "   Equal Access to Services     [facility] : Hadii aad ku hadmoniquetammi Sofiaali, wadavidda luqadaha, qaybta katuhoward lala. So [facility] [phone].    ATENCIÓN: Si habla español, tiene a zamora disposición servicios gratuitos de asistencia lingüística. Llame al [phone].    We comply with applicable federal civil rights laws and [place] laws. We do not discriminate on the basis of race, color, national origin, age, disability, sex, sexual orientation, or gender identity.            Thank you!     Thank you for choosing [facility]  for your care. Our goal is always to provide you with excellent care. Hearing back from our patients is one way we can continue to improve our services. Please take a few minutes to complete the written survey that you may receive in the mail after your visit with us. Thank you!             Your Updated Medication List - Protect others around you: Learn how to safely use, store and throw away your medicines at www.disposemymeds.org.          This list is accurate as of 8/7/18  1:44 PM.  Always use your most recent med list.                   Brand Name Dispense Instructions for use Diagnosis    aspirin 81 MG tablet      Take  by mouth daily.        calcium 600/vitamin D 600-400 MG-UNIT per tablet   Generic drug:  calcium-vitamin D      Take 1 tablet by mouth daily.        CoQ10 100 MG Caps      Take  by mouth daily.        fish oil-omega-3 fatty acids 1000 MG capsule      Take 1 capsule by mouth daily.        Garlic 1000 MG Caps      Take  by mouth daily.        GLUCOSAMINE CHONDR COMPLEX PO      Take  by mouth daily.        iron 325 (65 Fe) MG tablet      Take 1 tablet by mouth daily.        lisinopril 30 MG tablet    PRINIVIL,ZESTRIL    90 tablet    Take 1 tablet (30 mg) by mouth daily    Essential hypertension       MAGNESIUM OXIDE PO      Take 400 mg by mouth    CLL (chronic lymphocytic leukemia) (H)       MULTI-B COMPLEX PO       Take  by mouth daily.        multivitamin Tabs tablet      Take 1 tablet by mouth daily        Na Sulfate-K Sulfate-Mg Sulf solution    SUPREP BOWEL PREP KIT    2 Bottle    Take 177 mLs (1 Bottle) by mouth See Admin Instructions    Special screening for malignant neoplasms, colon

## 2018-08-08 NOTE — H&P (VIEW-ONLY)
[facility] Surgery Consultation    CC:  Positive fecal occult blood    HPI:  This 68 year old year old male is seen at the request of [facility] for evaluation of positive fecal occult blood.  The history is obtained from the patient, and reviewing the medical record.  He is good medical historian. Mr. Purcell states that he has been following with oncology for his CLL and he underwent a fecal occult blood as his hemoglobin was low. This came back as positive and he was referred to general surgery for evaluation. He says he has not had any melena, hematochezia, abdominal pain, bloating or cramping. He has no upper GI symptoms. He has no family history of colon cancer.    Past Medical History:   Diagnosis Date     Chronic lymphocytic leukemia (CLL), T-cell (H)     white counts in the 34,000 to 40,000      Colonic polyp 08/07/2014    tubular adenoma     Elevated blood pressure reading without diagnosis of hypertension 10/16/2002     Fracture of metatarsal bone(s), closed 10/29/2001    right 5th metatarsal bone     Hypertension      Other orthopedic aftercare(V54.89) 11/08/2001     Pericarditis     2005     Routine general medical examination at [facility] 12/01/2000     Special screening for malignant neoplasms, colon 02/04/2004     Tubular adenoma of colon 02/2014    single        Past Surgical History:   Procedure Laterality Date     BACK SURGERY  1997    L4-L5 partial laminectomy     BIOPSY  06/08/2017    abdomen     COLONOSCOPY  02/04/2004    No polyps     COLONOSCOPY  8/4/2014    Procedure: COLONOSCOPY;  Surgeon: Meliza Morales MD;  Location: [facility] OR      LEFT HEART CATHETERIZATION  05/10/2005    Normal angiogram     LAMINECTOMY CERIVCAL POSTERIOR ONE LEVEL  1997     LAPAROSCOPY DIAGNOSTIC (GENERAL) N/A 6/8/2017    Procedure: LAPAROSCOPY DIAGNOSTIC (GENERAL);  Diagnostic Laparoscopy, Laparatomy Open resection of Mesenteric Lymph Node ;  Surgeon: Melba Goodrich MD;  Location: [facility] OR       Pt  denied problems with bleeding or anesthesia    Prior to Admission medications    Medication Sig Start Date End Date Taking? Authorizing Provider   aspirin 81 MG tablet Take  by mouth daily.   Yes Reported, Patient   B Complex-Biotin-FA (MULTI-B COMPLEX PO) Take  by mouth daily.   Yes Reported, Patient   calcium-vitamin D (CALCIUM 600/VITAMIN D) 600-400 MG-UNIT per tablet Take 1 tablet by mouth daily.   Yes Reported, Patient   Coenzyme Q10 (COQ10) 100 MG CAPS Take  by mouth daily.   Yes Reported, Patient   Ferrous Sulfate (IRON) 325 (65 FE) MG tablet Take 1 tablet by mouth daily.   Yes Reported, Patient   fish oil-omega-3 fatty acids (FISH OIL) 1000 MG capsule Take 1 capsule by mouth daily.    Yes Reported, Patient   Garlic 1000 MG CAPS Take  by mouth daily.   Yes Reported, Patient   Glucosamine-Chondroitin (GLUCOSAMINE CHONDR COMPLEX PO) Take  by mouth daily.   Yes Reported, Patient   lisinopril (PRINIVIL,ZESTRIL) 30 MG tablet Take 1 tablet (30 mg) by mouth daily 7/23/18  Yes Juan Kenney,    MAGNESIUM OXIDE PO Take 400 mg by mouth   Yes Reported, Patient   multivitamin (OCUVITE) TABS Take 1 tablet by mouth daily   Yes Reported, Patient        No Known Allergies      HABITS:    Social History   Substance Use Topics     Smoking status: Never Smoker     Smokeless tobacco: Never Used     Alcohol use Yes      Comment: red wine nightly     No mood altering drug use.    Family History   Problem Relation Age of Onset     HEART DISEASE Mother      stents the 2nd time, CABG     C.A.D. Mother      Coronary Artery Disease Mother      Cerebrovascular Disease Father 58     HEART DISEASE Father      Hyperlipidemia Father      Unknown/Adopted Maternal Grandmother      Unknown/Adopted Maternal Grandfather      Unknown/Adopted Paternal Grandmother      Unknown/Adopted Paternal Grandfather      Hypertension Brother      Other Cancer Brother      Hypertension Brother      Other Cancer Brother      Cancer No family hx of   "    skin cancer     Diabetes No family hx of      Breast Cancer No family hx of      Colon Cancer No family hx of      Anesthesia Reaction No family hx of      Asthma No family hx of      Thyroid Disease No family hx of        REVIEW OF SYSTEMS:  Ten point review of systems negative except those mentioned in the HPI.     The patient denies sleep apnea, latex allergies or MRSA    OBJECTIVE:    /68  Pulse 68  Temp 98.1  F (36.7  C) (Tympanic)  Resp 16  Ht 5' 10.5\" (1.791 m)  Wt 166 lb (75.3 kg)  SpO2 (!) 68%  BMI 23.48 kg/m2    GENERAL: Generally appears well, in no distress with appropriate affect.  HEENT:   Sclerae anicteric - No cervical, supra/infraclavicular lymphadenopathy, no thyroid masses  Respiratory:  Lungs clear to ausculation bilaterally with good air excursion  Cardiovascular:  Regular Rate and Rhythm with no murmurs gallops or rubs, normal   Abdomen: soft, NT/ND  :  deferred  Extremities:  Extremities normal. No deformities, edema, or skin discoloration.  Skin:  no suspicious lesions or rashes  Neurological: grossly intact    Psych:  Alert, oriented, affect appropriate with normal decision making ability.      IMPRESSION:  69 yo male with positive fecal occult blood    PLAN:  A detailed description of the [place] Preventive Task Force development of colorectal cancer screening was had. I described the pathology of the adenoma to carcinoma progression and its genetic changes that occur. I discussed how with colorectal cancer screening by endoscopic surveillance we are able to identify potential malignancies and remove them before they progress along the adenoma to carcinoma pathway. The risks for colon cancer progression were discussed including first degree relatives with colon cancer, inflammatory bowel disease, smoking, obesity, and diet. I then discussed how there are certain attributes which can decrease the risk of colon cancer such as a healthy diet and physical activity. The " patient understood the adenoma to carcinoma sequence, the reasoning for screening at specific intervals, and risk factor modification. I then described the technical portion of the procedure.    The indications, risks, benefits and technical aspects of whole colon colonoscopy were outlined with risks including, but not limited to, perforation, bleeding and inability to visualize entire colon.  Management of each was reviewed.  The need of mechanical preparation of the colon was reviewed along with the use of monitored anesthetic care.  The patient's questions were asked and answered.  Scheduled first available date.        Thank you for allowing me to participate in the care of your patient.       Ministerio Henry MD    8/7/2018  1:39 PM    cc:  Juan Kenney

## 2018-08-10 NOTE — ANESTHESIA PREPROCEDURE EVALUATION
Anesthesia Evaluation     . Pt has had prior anesthetic. Type: General and MAC    No history of anesthetic complications          ROS/MED HX    ENT/Pulmonary:       Neurologic:  - neg neurologic ROS     Cardiovascular:     (+) hypertension-range: no beta blocker; normal 130/70, ---. : . . . :. . Previous cardiac testing date:results:date: results:ECG reviewed date:8/7/18 results:EKG with left axis deviation date: results:          METS/Exercise Tolerance:  >4 METS   Hematologic:  - neg hematologic  ROS   (+) Other Hematologic Disorder-CLL      Musculoskeletal:   (+) , , other musculoskeletal- s/p L4-L5 discectomy 1997      GI/Hepatic:     (+) bowel prep,       Renal/Genitourinary:  - ROS Renal section negative       Endo:  - neg endo ROS       Psychiatric:  - neg psychiatric ROS       Infectious Disease:  - neg infectious disease ROS       Malignancy:   (+) Malignancy History of Lymphoma/Leukemia          Other:    - neg other ROS                 Physical Exam  Normal systems: cardiovascular, pulmonary and dental    Airway   Mallampati: III  TM distance: >3 FB  Neck ROM: full    Dental     Cardiovascular   Rhythm and rate: regular and normal      Pulmonary                     Anesthesia Plan      History & Physical Review  History and physical reviewed and following examination; no interval change.    ASA Status:  2 .    NPO Status:  > 8 hours    Plan for MAC with Intravenous and Propofol induction. Maintenance will be TIVA.  Reason for MAC:  Deep or markedly invasive procedure (G8)  PONV prophylaxis:  Ondansetron (or other 5HT-3)  Risks and benefits of MAC anesthetic discussed including dental damage, aspiration, loss of airway, conversion to general anesthetic, CV complications, MI, stroke, death. Pt wishes to proceed.         Postoperative Care  Postoperative pain management:  IV analgesics.      Consents  Anesthetic plan, risks, benefits and alternatives discussed with:  Patient.  Use of blood products  discussed: Yes.   Use of blood products discussed with Patient.  Consented to blood products.  .                          .

## 2018-08-13 NOTE — IP AVS SNAPSHOT
HI Preop/Phase II    [address] [address]    Phone:  [phone]                                       After Visit Summary   8/13/2018    Ulysses Purcell    MRN: [id number]           After Visit Summary Signature Page     I have received my discharge instructions, and my questions have been answered. I have discussed any challenges I see with this plan with the nurse or doctor.    ..........................................................................................................................................  Patient/Patient Representative Signature      ..........................................................................................................................................  Patient Representative Print Name and Relationship to Patient    ..................................................               ................................................  Date                                            Time    ..........................................................................................................................................  Reviewed by Signature/Title    ...................................................              ..............................................  Date                                                            Time

## 2018-08-13 NOTE — IP AVS SNAPSHOT
MRN:[id number]                      After Visit Summary   8/13/2018    Ulysses Purcell    MRN: [id number]           Thank you!     Thank you for choosing [facility] for your care. Our goal is always to provide you with excellent care. Hearing back from our patients is one way we can continue to improve our services. Please take a few minutes to complete the written survey that you may receive in the mail after you visit with us. Thank you!        Patient Information     Date Of Birth          1950        About your hospital stay     You were admitted on:  August 13, 2018 You last received care in the:  HI Preop/Phase II    You were discharged on:  August 13, 2018       Who to Call     For medical emergencies, please call 911.  For non-urgent questions about your medical care, please call your primary care provider or clinic, [phone]  For questions related to your surgery, please call your surgery clinic        Attending Provider     Provider Specialty    Ministerio Henry MD Surgery       Primary Care Provider Office Phone # Fax #    Juan SolorzanoDO alvin [phone]      After Care Instructions     Discharge Instructions       Resume pre procedure diet            Discharge Instructions       Restart home medications.                  Your next 10 appointments already scheduled     Oct 24, 2018  8:45 AM CDT   LAB with  LAB   [facility] ([facility] [place] )    [address]  [place] [address]   [phone]            Oct 24, 2018  9:20 AM CDT   (Arrive by 9:05 AM)   Return Visit with Nicole Ga NP   [facility] ([facility] [place] )    [address]  [place] [address]   [phone]              Further instructions from your care team           INSTRUCTIONS AFTER COLONOSCOPY    WHEN YOU ARE BACK HOME:    Plan to rest for an hour or two after you get home.    You may have some cramping or  pressure until you pass gas.    You may resume your regular medications.    Eat a small, light meal at first, and then gradually return to normal meal sizes.  If you had a polyp removed:    Slight bleeding may occur.  You may have a slight blood stain on the toilet paper after a bowel movement.    To lessen the chance of bleeding, avoid heavy exercise for ONE WEEK.  This includes heavy lifting, vigorous sport activities, and heavy physical labor.  You may resume your normal sexual activity.      Avoid aspirin or aspirin products if instructed by your doctor.    WHAT TO WATCH FOR:  Problems rarely occur after the exam; however, it is important for you to watch for early signs of possible problems.  If you have     Unusual pain in your abdomen    Nausea and vomiting that persists    Excessive bleeding    Black or bloody bowel movements    Fever or temperature above 100.6 F  Please call your doctor ([facility] [phone]) or go to the nearest hospital emergency room.    Post-Anesthesia Patient Instructions    IMMEDIATELY FOLLOWING SURGERY:  Do not drive or operate machinery for the first twenty four hours after surgery.  Do not make any important decisions for twenty four hours after surgery or while taking narcotic pain medications or sedatives.  If you develop intractable nausea and vomiting or a severe headache please notify your doctor immediately.    FOLLOW-UP:  Please make an appointment with your surgeon as instructed. You do not need to follow up with anesthesia unless specifically instructed to do so.    WOUND CARE INSTRUCTIONS (if applicable):  Keep a dry clean dressing on the anesthesia/puncture wound site if there is drainage.  Once the wound has quit draining you may leave it open to air.  Generally you should leave the bandage intact for twenty four hours unless there is drainage.  If the epidural site drains for more than 36-48 hours please call the anesthesia department.    QUESTIONS?:  Please feel  "free to call your physician or the hospital  if you have any questions, and they will be happy to assist you.       Pending Results     No orders found from 8/11/2018 to 8/14/2018.            Admission Information     Date & Time Provider Department Dept. Phone    8/13/2018 Ministerio Henry MD HI Preop/Phase [phone]      Your Vitals Were     Blood Pressure Temperature Respirations Height Weight Pulse Oximetry    119/79 96.6  F (35.9  C) (Oral) 18 1.791 m (5' 10.5\") 75.3 kg (166 lb) 96%    BMI (Body Mass Index)                   23.48 kg/m2           MyChart Information     [employer] gives you secure access to your electronic health record. If you see a primary care provider, you can also send messages to your care team and make appointments. If you have questions, please call your primary care clinic.  If you do not have a primary care provider, please call [phone] and they will assist you.        Care EveryWhere ID     This is your Care EveryWhere ID. This could be used by other organizations to access your [place] medical records  [id number]        Equal Access to Services     ANNA LARA : Hadii favian Caruso, romeo lopez, tutu robbins, howard jones . So [facility] [phone].    ATENCIÓN: Si habla español, tiene a zamora disposición servicios gratuitos de asistencia lingüística. [facility] [phone].    We comply with applicable federal civil rights laws and [place] laws. We do not discriminate on the basis of race, color, national origin, age, disability, sex, sexual orientation, or gender identity.               Review of your medicines      CONTINUE these medicines which have NOT CHANGED        Dose / Directions    aspirin 81 MG tablet        Take  by mouth daily.   Refills:  0       calcium 600/vitamin D 600-400 MG-UNIT per tablet   Generic drug:  calcium-vitamin D        Dose:  1 tablet   Take 1 tablet by mouth daily.   Refills:  0 "       CoQ10 100 MG Caps        Take  by mouth daily.   Refills:  0       fish oil-omega-3 fatty acids 1000 MG capsule        Dose:  1 capsule   Take 1 capsule by mouth daily.   Refills:  0       Garlic 1000 MG Caps        Take  by mouth daily.   Refills:  0       GLUCOSAMINE CHONDR COMPLEX PO        Take  by mouth daily.   Refills:  0       iron 325 (65 Fe) MG tablet        Dose:  1 tablet   Take 1 tablet by mouth daily.   Refills:  0       lisinopril 30 MG tablet   Commonly known as:  PRINIVIL,ZESTRIL   Used for:  Essential hypertension        Dose:  30 mg   Take 1 tablet (30 mg) by mouth daily   Quantity:  90 tablet   Refills:  3       MAGNESIUM OXIDE PO   Used for:  CLL (chronic lymphocytic leukemia) (H)        Dose:  400 mg   Take 400 mg by mouth   Refills:  0       MULTI-B COMPLEX PO        Take  by mouth daily.   Refills:  0       multivitamin Tabs tablet        Dose:  1 tablet   Take 1 tablet by mouth daily   Refills:  0         STOP taking     Na Sulfate-K Sulfate-Mg Sulf solution   Commonly known as:  SUPREP BOWEL PREP KIT                    Protect others around you: Learn how to safely use, store and throw away your medicines at www.disposemymeds.org.             Medication List: This is a list of all your medications and when to take them. Check marks below indicate your daily home schedule. Keep this list as a reference.      Medications           Morning Afternoon Evening Bedtime As Needed    aspirin 81 MG tablet   Take  by mouth daily.                                calcium 600/vitamin D 600-400 MG-UNIT per tablet   Take 1 tablet by mouth daily.   Generic drug:  calcium-vitamin D                                CoQ10 100 MG Caps   Take  by mouth daily.                                fish oil-omega-3 fatty acids 1000 MG capsule   Take 1 capsule by mouth daily.                                Garlic 1000 MG Caps   Take  by mouth daily.                                GLUCOSAMINE CHONDR COMPLEX PO   Take   by mouth daily.                                iron 325 (65 Fe) MG tablet   Take 1 tablet by mouth daily.                                lisinopril 30 MG tablet   Commonly known as:  PRINIVIL,ZESTRIL   Take 1 tablet (30 mg) by mouth daily                                MAGNESIUM OXIDE PO   Take 400 mg by mouth                                MULTI-B COMPLEX PO   Take  by mouth daily.                                multivitamin Tabs tablet   Take 1 tablet by mouth daily

## 2018-08-13 NOTE — ANESTHESIA POSTPROCEDURE EVALUATION
Patient: Ulysses Purcell    Procedure(s):  COLONOSCOPY - Wound Class: II-Clean Contaminated    Diagnosis:POSITIVE FECAL OCCULT BLOOD/SCREENING  Diagnosis Additional Information: No value filed.    Anesthesia Type:  MAC    Note:  Anesthesia Post Evaluation    Patient location during evaluation: Phase 2  Patient participation: Able to fully participate in evaluation  Level of consciousness: awake and alert  Pain management: adequate  Airway patency: patent  Cardiovascular status: acceptable  Respiratory status: acceptable  Hydration status: acceptable  PONV: none     Anesthetic complications: None          Last vitals:  Vitals:    08/13/18 1035 08/13/18 1040 08/13/18 1045   BP:  115/88    Resp: 18 18    Temp:      SpO2: 95% 96% 95%         Electronically Signed By: ISACC Arreola CRNA  August 13, 2018  11:30 AM

## 2018-08-13 NOTE — OR NURSING
Patient and responsible adult given discharge instructions with no questions regarding instructions. Vu score 20. Pain level 0/10.  Discharged from unit via ambulated. Patient discharged to home.

## 2018-08-13 NOTE — DISCHARGE INSTRUCTIONS
INSTRUCTIONS AFTER COLONOSCOPY    WHEN YOU ARE BACK HOME:    Plan to rest for an hour or two after you get home.    You may have some cramping or pressure until you pass gas.    You may resume your regular medications.    Eat a small, light meal at first, and then gradually return to normal meal sizes.  If you had a polyp removed:    Slight bleeding may occur.  You may have a slight blood stain on the toilet paper after a bowel movement.    To lessen the chance of bleeding, avoid heavy exercise for ONE WEEK.  This includes heavy lifting, vigorous sport activities, and heavy physical labor.  You may resume your normal sexual activity.      Avoid aspirin or aspirin products if instructed by your doctor.    WHAT TO WATCH FOR:  Problems rarely occur after the exam; however, it is important for you to watch for early signs of possible problems.  If you have     Unusual pain in your abdomen    Nausea and vomiting that persists    Excessive bleeding    Black or bloody bowel movements    Fever or temperature above 100.6 F  Please call your doctor ([facility] [phone]) or go to the nearest hospital emergency room.    Post-Anesthesia Patient Instructions    IMMEDIATELY FOLLOWING SURGERY:  Do not drive or operate machinery for the first twenty four hours after surgery.  Do not make any important decisions for twenty four hours after surgery or while taking narcotic pain medications or sedatives.  If you develop intractable nausea and vomiting or a severe headache please notify your doctor immediately.    FOLLOW-UP:  Please make an appointment with your surgeon as instructed. You do not need to follow up with anesthesia unless specifically instructed to do so.    WOUND CARE INSTRUCTIONS (if applicable):  Keep a dry clean dressing on the anesthesia/puncture wound site if there is drainage.  Once the wound has quit draining you may leave it open to air.  Generally you should leave the bandage intact for twenty four  hours unless there is drainage.  If the epidural site drains for more than 36-48 hours please call the anesthesia department.    QUESTIONS?:  Please feel free to call your physician or the hospital  if you have any questions, and they will be happy to assist you.

## 2018-08-13 NOTE — OP NOTE
Ulysses Purcell MRN# [id number]   YOB: 1950 Age: 68 year old      Date of Admission:  8/13/2018    Primary care provider: Juan Kenney    PREOPERATIVE DIAGNOSIS:  Diagnostic colonoscopy.         POSTOPERATIVE DIAGNOSIS:  Normal colonoscopic examination.          PROCEDURE:  Whole colon colonoscopy.         INDICATIONS:  This 68 year old male presents for diagnostic colonoscopy with positive fecal occult blood        OPERATIVE FINDINGS:  The colonic mucosa was normal from anus to cecum.          DESCRIPTION OF PROCEDURE:  With the patient in the supine position on the transport cart, IV sedation was administered by the nurse anesthetist.  His correct identity and planned procedure were confirmed during the requisite timeout pause and he was rolled to the left lateral position.  The anus was digitally dilated and the fiberoptic colonoscope was introduced and negotiated through the length of the colon to the cecal base.  The cecum was intubated and its landmarks clearly identified.  A circumferential examination of the mucosa on introduction of the colonoscope and on its slow withdrawal confirmed the absence of polyp, neoplasia, inflammation and/or stricture.  There were moderate diverticuli noted.  Retroflex in the rectal ampulla showed no evidence of pathology.  Air was aspirated and the colonoscope was withdrawn; the patient was returned to day surgery in good condition, without suggestion of complication and with our invitation to return in 10 years for followup screening examination.   The post surgical debriefing was held and acknowledged at completion.          Ministerio Herny MD  8/13/2018 10:19 AM

## 2018-08-13 NOTE — ANESTHESIA CARE TRANSFER NOTE
Patient: Ulysses Purcell    Procedure(s):  COLONOSCOPY - Wound Class: II-Clean Contaminated    Diagnosis: POSITIVE FECAL OCCULT BLOOD/SCREENING  Diagnosis Additional Information: No value filed.    Anesthesia Type:   MAC     Note:  Airway :Nasal Cannula  Patient transferred to:Phase II  Handoff Report: Identifed the Patient, Identified the Reponsible Provider, Reviewed the pertinent medical history, Discussed the surgical course, Reviewed Intra-OP anesthesia mangement and issues during anesthesia, Set expectations for post-procedure period and Allowed opportunity for questions and acknowledgement of understanding      Vitals: (Last set prior to Anesthesia Care Transfer)    CRNA VITALS  8/13/2018 0951 - 8/13/2018 1022      8/13/2018             Resp Rate (set): 8                Electronically Signed By: ISACC Hernadez CRNA  August 13, 2018  10:22 AM

## 2018-10-24 NOTE — PROGRESS NOTES

## 2018-10-24 NOTE — MR AVS SNAPSHOT
After Visit Summary   10/24/2018    Ulysses Purcell    MRN: [id number]           Patient Information     Date Of Birth          1950        Visit Information        Provider Department      10/24/2018 4:10 PM Nicole Ga NP [facility] [place]        Today's Diagnoses     CLL (chronic lymphocytic leukemia) (H)    -  1    Need for prophylactic vaccination and inoculation against influenza          Care Instructions    See note 10/25/18          Follow-ups after your visit        Your next 10 appointments already scheduled     Feb 01, 2019  7:30 AM CST   LAB with HC LAB   [facility] [place] ([facility] [place] )    [address]  [place] [address]   [phone]            Feb 01, 2019  4:10 PM CST   (Arrive by 3:55 PM)   Return Visit with Nicole Ga NP   [facility] [place] ([facility] [place] )    [address]  [place] [address]   [phone]              Who to contact     If you have questions or need follow up information about today's clinic visit or your schedule please contact [facility] directly at [phone].  Normal or non-critical lab and imaging results will be communicated to you by MyChart, letter or phone within 4 business days after the clinic has received the results. If you do not hear from us within 7 days, please contact the clinic through [employer] or phone. If you have a critical or abnormal lab result, we will notify you by phone as soon as possible.  Submit refill requests through [employer] or call your pharmacy and they will forward the refill request to us. Please allow 3 business days for your refill to be completed.          Additional Information About Your Visit        [employer] Information     [employer] gives you secure access to your electronic health record. If you see a primary care provider, you can also send messages to your care team and make  "appointments. If you have questions, please call your primary care clinic.  If you do not have a primary care provider, please call [phone] and they will assist you.        Care EveryWhere ID     This is your Care EveryWhere ID. This could be used by other organizations to access your [place] medical records  [id number]        Your Vitals Were     Pulse Temperature Respirations Height Pulse Oximetry BMI (Body Mass Index)    78 97.6  F (36.4  C) (Tympanic) 18 1.791 m (5' 10.5\") 98% 23.34 kg/m2       Blood Pressure from Last 3 Encounters:   10/24/18 130/80   08/13/18 115/88   08/07/18 132/68    Weight from Last 3 Encounters:   10/24/18 74.8 kg (165 lb)   08/13/18 75.3 kg (166 lb)   08/07/18 75.3 kg (166 lb)              We Performed the Following     ADMIN INFLUENZA (For MEDICARE Patients ONLY) []     HC FLU VACCINE, INCREASED ANTIGEN, PRESV FREE        Primary Care Provider Office Phone # Fax #    Juan Keith SolorzanoDO alvin [phone]       [address] [place] [address]        Equal Access to Services     ANNA LARA : Hadii aad ku hadasho Soomaali, waaxda luqadaha, qaybta kaalmada adeegyada, howard carlos. So [facility] [phone].    ATENCIÓN: Si habla español, tiene a zamora disposición servicios gratuitos de asistencia lingüística. Llame al [phone].    We comply with applicable federal civil rights laws and [place] laws. We do not discriminate on the basis of race, color, national origin, age, disability, sex, sexual orientation, or gender identity.            Thank you!     Thank you for choosing [facility]  for your care. Our goal is always to provide you with excellent care. Hearing back from our patients is one way we can continue to improve our services. Please take a few minutes to complete the written survey that you may receive in the mail after your visit with us. Thank you!             Your Updated Medication List - " Protect others around you: Learn how to safely use, store and throw away your medicines at www.disposemymeds.org.          This list is accurate as of 10/24/18 11:59 PM.  Always use your most recent med list.                   Brand Name Dispense Instructions for use Diagnosis    aspirin 81 MG tablet      Take  by mouth daily.        calcium 600/vitamin D 600-400 MG-UNIT per tablet   Generic drug:  calcium carbonate 600 mg-vitamin D 400 units      Take 1 tablet by mouth daily.        CoQ10 100 MG Caps      Take  by mouth daily.        fish oil-omega-3 fatty acids 1000 MG capsule      Take 1 capsule by mouth daily.        Garlic 1000 MG Caps      Take  by mouth daily.        GLUCOSAMINE CHONDR COMPLEX PO      Take  by mouth daily.        iron 325 (65 Fe) MG tablet      Take 1 tablet by mouth daily.        lisinopril 30 MG tablet    PRINIVIL,ZESTRIL    90 tablet    Take 1 tablet (30 mg) by mouth daily    Essential hypertension       MAGNESIUM OXIDE PO      Take 400 mg by mouth    CLL (chronic lymphocytic leukemia) (H)       MULTI-B COMPLEX PO      Take  by mouth daily.        multivitamin Tabs tablet      Take 1 tablet by mouth daily

## 2018-10-24 NOTE — TELEPHONE ENCOUNTER
DATE:  10/24/2018   TIME OF RECEIPT FROM LAB:  0850  LAB TEST:  WBC  LAB VALUE:  133.9  RESULTS GIVEN WITH READ-BACK TO (PROVIDER):  Louisa Ga NP  TIME LAB VALUE REPORTED TO PROVIDER:   0859

## 2018-10-24 NOTE — PROGRESS NOTES
Oncology Follow-up Visit:  October 24, 2018    Reason for Visit:  Patient presents with:  RECHECK: Follow up CLL (chronic lymphocytic leukemia)      Nursing Note and documentation reviewed: yes    HPI: This is a 68-year-old male patient who presents to the oncology clinic today in routine follow-up of CLL.  Extensive workup was completed in August of 2013 with initial consult. He has been followed with surveillance.      He presents to the clinic once again today without complaints.  He denies any issues with fevers, night sweats, weight loss or new lumps or bumps that are concerning.  He underwent colonoscopy recently related to a slight drop in his hemoglobin and positive occult blood check and per patient this was negative.  He follows with Dr. Kenney as his PCP.      He does question having a flu shot today along with whether or not he should get an pneumonia shot.      Oncologic History:   Damien was diagnosed with CLL in August 2013 after his routine physical with Dr. Kenney showed an elevation of his white blood cell count on his CBC. CBC at that time revealed WBC of 18.9 with 69.6% lymphocytes, H&H was 14.8 and 42.3, platelet count was 158,000. Peripheral blood smear showed lymphocytosis consistent with chronic lymphocytic leukemia/small lymphocytic lymphoma and mild thrombocytopenia. Peripheral blood flow for cytometry showed an abnormal B cell population consistent with B-cell CLL. The patient was CD5-positive, CD10 negative as well as CD23 positive with 47% B cells expressed at C38 above levels of controls, 80% B cells had levels of cytoplasmic 70 comparable to T lymphocytes and assessment was sent for cytogenetics and FISH. Serum protein electrophoresis was negative. The FISH study was not able to be completed at that time.   He was then referred to Dr. Shantanu Stubbs at the [facility] who saw the patient on 10/16/2013. In review of patient's studies, he felt the patient had stage II CLL as he  did have mild splenomegaly on his PET scan. He had concerns about flow cytometry findings as it pointed to a more aggressive disease and the FISH testing was repeated and showed predominance of trisomy 12 which was consistent with intermediate prognosis. It was felt the major determinant of prognosis in CLL would be the behavior of the disease over the course of the next several months to a year and in subsequent followups patient has been essentially asymptomatic. Dr. Tidwell recommended CBCs every 3 months along with physical exams for nodes and organomegaly and recommended a CT scan yearly.       In May 2017, patient's WBC and lymphocytes increased and imaging was completed.  CT scan of the abdomen and pelvis showed a mass and he was sent to the [facility] for evaluation.  He underwent surgical exploration on 6/8/2017 by Dr. Goodrich and no mass was found.  Biopsies were completed of some prominent nodes with pathology consistent with his CLL diagnoses.  He followed up with Dr. Goodrich on 6/28/17 and repeat CT scan was done which showed no mass.      Current Chemo Regime/TX: n/a  Current Cycle: n/a  # of completed cycles: n/a      Previous treatment: n/a    Past Medical History:   Diagnosis Date     Chronic lymphocytic leukemia (CLL), T-cell (H)     white counts in the 34,000 to 40,000      Colonic polyp 08/07/2014    tubular adenoma     Elevated blood pressure reading without diagnosis of hypertension 10/16/2002     Fracture of metatarsal bone(s), closed 10/29/2001    right 5th metatarsal bone     Hypertension      Other orthopedic aftercare(V54.89) 11/08/2001     Pericarditis     2005     Routine general medical examination at [facility] 12/01/2000     Special screening for malignant neoplasms, colon 02/04/2004     Tubular adenoma of colon 02/2014    single        Past Surgical History:   Procedure Laterality Date     BACK SURGERY  1997    L4-L5 partial laminectomy     BIOPSY  06/08/2017    abdomen      COLONOSCOPY  02/04/2004    No polyps     COLONOSCOPY  8/4/2014    Procedure: COLONOSCOPY;  Surgeon: Meliza Morales MD;  Location: [facility] OR     COLONOSCOPY N/A 8/13/2018    Procedure: COLONOSCOPY;  COLONOSCOPY;  Surgeon: Ministerio Henry MD;  Location: [facility] OR     HC LEFT HEART CATHETERIZATION  05/10/2005    Normal angiogram     LAMINECTOMY CERIVCAL POSTERIOR ONE LEVEL  1997     LAPAROSCOPY DIAGNOSTIC (GENERAL) N/A 6/8/2017    Procedure: LAPAROSCOPY DIAGNOSTIC (GENERAL);  Diagnostic Laparoscopy, Laparatomy Open resection of Mesenteric Lymph Node ;  Surgeon: Melba Goodrich MD;  Location: [facility] OR       Family History   Problem Relation Age of Onset     HEART DISEASE Mother      stents the 2nd time, CABG     C.A.D. Mother      Coronary Artery Disease Mother      Cerebrovascular Disease Father 58     HEART DISEASE Father      Hyperlipidemia Father      Unknown/Adopted Maternal Grandmother      Unknown/Adopted Maternal Grandfather      Unknown/Adopted Paternal Grandmother      Unknown/Adopted Paternal Grandfather      Hypertension Brother      Other Cancer Brother      Hypertension Brother      Other Cancer Brother      Cancer No family hx of      skin cancer     Diabetes No family hx of      Breast Cancer No family hx of      Colon Cancer No family hx of      Anesthesia Reaction No family hx of      Asthma No family hx of      Thyroid Disease No family hx of        Social History     Social History     Marital status:      Spouse name: N/A     Number of children: N/A     Years of education: N/A     Occupational History     Not on file.     Social History Main Topics     Smoking status: Never Smoker     Smokeless tobacco: Never Used     Alcohol use Yes      Comment: red wine nightly     Drug use: No     Sexual activity: Yes     Partners: Female     Other Topics Concern     Parent/Sibling W/ Cabg, Mi Or Angioplasty Before 65f 55m? Yes     father     Social History Narrative       Current Outpatient  "Prescriptions   Medication     aspirin 81 MG tablet     B Complex-Biotin-FA (MULTI-B COMPLEX PO)     calcium-vitamin D (CALCIUM 600/VITAMIN D) 600-400 MG-UNIT per tablet     Coenzyme Q10 (COQ10) 100 MG CAPS     Ferrous Sulfate (IRON) 325 (65 FE) MG tablet     fish oil-omega-3 fatty acids (FISH OIL) 1000 MG capsule     Garlic 1000 MG CAPS     Glucosamine-Chondroitin (GLUCOSAMINE CHONDR COMPLEX PO)     lisinopril (PRINIVIL,ZESTRIL) 30 MG tablet     MAGNESIUM OXIDE PO     multivitamin (OCUVITE) TABS     No current facility-administered medications for this visit.         No Known Allergies    Review Of Systems:  Constitutional: denies fever, weight changes, chills, and night sweats.  Eyes: denies blurred or double vision  Ears/Nose/Throat: denies ear pain, nose problems, difficulty swallowing  Respiratory: denies shortness of breath, cough   Skin: denies rash, lesions  Cardiovascular: denies chest pain, palpitations, edema  Gastrointestinal: denies abdominal pain, bloating, nausea, vomiting, early satiety  Genitourinary: denies difficulty with urination, blood in urine  Musculoskeletal:denies new muscle pain, bone pain  Neurologic: denies lightheadedness, headaches, numbness or tingling  Psychiatric: denies anxiety, depression  Hematologic/Lymphatic/Immunologic: denies easy bruising, easy bleeding, lumps or bumps noted  Endocrine: Denies increased thirst      Physical Exam:  /80  Pulse 78  Temp 97.6  F (36.4  C) (Tympanic)  Resp 18  Ht 1.791 m (5' 10.5\")  Wt 74.8 kg (165 lb)  SpO2 98%  BMI 23.34 kg/m2    GENERAL APPEARANCE: Healthy, alert and in no acute distress.  HEENT: Normocephalic, Sclerae anicteric. Oropharynx without ulcers, lesions, or thrush.  NECK:  No asymmetry or masses, no thyromegaly.  LYMPHATICS: No palpable cervical, supraclavicular, axillary, or inguinal nodes   RESP: Lungs clear to auscultation bilaterally, respirations regular and easy  CARDIOVASCULAR: Regular rate and rhythm. Normal S1, " S2; no murmur, gallop, or rub.  ABDOMEN: Soft, nontender. Bowel sounds auscultated all 4 quadrants. No palpable organomegaly or masses.  MUSCULOSKELETAL: Extremities without gross deformities noted. No edema of bilateral lower extremities.  NEURO: Alert and oriented x 3.  Gait steady.  PSYCHIATRIC: Mentation and affect appear normal.  Mood appropriate.    Laboratory:  Results for orders placed or performed in visit on 10/24/18   CBC with platelets differential   Result Value Ref Range    .9 (HH) 4.0 - 11.0 10e9/L    RBC Count 4.42 4.4 - 5.9 10e12/L    Hemoglobin 13.4 13.3 - 17.7 g/dL    Hematocrit 39.8 (L) 40.0 - 53.0 %    MCV 90 78 - 100 fl    MCH 30.3 26.5 - 33.0 pg    MCHC 33.7 31.5 - 36.5 g/dL    RDW 14.3 10.0 - 15.0 %    Platelet Count 138 (L) 150 - 450 10e9/L    % Neutrophils 3.0 %    % Lymphocytes 92.0 %    % Monocytes 3.0 %    % Eosinophils 2.0 %    Absolute Neutrophil 4.0 1.6 - 8.3 10e9/L    Absolute Lymphocytes 123.2 (H) 0.8 - 5.3 10e9/L    Absolute Monocytes 4.0 (H) 0.0 - 1.3 10e9/L    Absolute Eosinophils 2.7 (H) 0.0 - 0.7 10e9/L    Diff Method Manual Differential    Comprehensive metabolic panel   Result Value Ref Range    Sodium 136 133 - 144 mmol/L    Potassium 5.4 (H) 3.4 - 5.3 mmol/L    Chloride 102 94 - 109 mmol/L    Carbon Dioxide 30 20 - 32 mmol/L    Anion Gap 4 3 - 14 mmol/L    Glucose 94 70 - 99 mg/dL    Urea Nitrogen 15 7 - 30 mg/dL    Creatinine 1.01 0.66 - 1.25 mg/dL    GFR Estimate 73 >60 mL/min/1.7m2    GFR Estimate If Black 89 >60 mL/min/1.7m2    Calcium 8.8 8.5 - 10.1 mg/dL    Bilirubin Total 0.8 0.2 - 1.3 mg/dL    Albumin 4.4 3.4 - 5.0 g/dL    Protein Total 7.3 6.8 - 8.8 g/dL    Alkaline Phosphatase 72 40 - 150 U/L    ALT 36 0 - 70 U/L    AST 36 0 - 45 U/L   Lactate Dehydrogenase   Result Value Ref Range    Lactate Dehydrogenase 435 (H) 85 - 227 U/L       Imaging Studies:  None completed for today's visit      ASSESSMENT/PLAN:    #1 CLL: Stage II chronic lymphocytic leukemia. WBC  and lymphocytes are up again with highest we have seen.  LDH up also.  He continues to be asymptomatic so most likely at this point will just continue to monitor as his hemoglobin is normal and platelets are sable.  I will discuss however with Dr. Booker to see what his recommendations may be in regards to further evaluation and follow-up.  He is aware I will call him once I discussed this with Dr. Booker.      His flu shot will be given today and we will check into the pneumonia vaccine for him.    I encouraged patient to call with any questions or concerns.    Nicole DEXTER, FNP-BC, AOCNP

## 2018-10-24 NOTE — NURSING NOTE
"Chief Complaint   Patient presents with     RECHECK     Follow up CLL (chronic lymphocytic leukemia)        Initial /80  Pulse 78  Temp 97.6  F (36.4  C) (Tympanic)  Resp 18  Ht 1.791 m (5' 10.5\")  Wt 74.8 kg (165 lb)  SpO2 98%  BMI 23.34 kg/m2 Estimated body mass index is 23.34 kg/(m^2) as calculated from the following:    Height as of this encounter: 1.791 m (5' 10.5\").    Weight as of this encounter: 74.8 kg (165 lb).  Medication Reconciliation: complete   Immunizations reviewed, advanced directives on file, pain = 0, PHQ9 = 0.    Annalise Calvert LPN    "

## 2018-10-25 NOTE — TELEPHONE ENCOUNTER
"Note sent to Dr. Booker in regards to CBC.  He reviewed and wrote \"If he is asymptomatic, we can continue to monitor his CBC, if it rises above 150K we will scan him. Thanks, Dr. BENAVIDES.\"  Please call patient and let him know this and that I will see him again in 3 months with the same labwork.  Call with any changes.  Also, he should get in for a pneumonia shot per guidelines.  "

## 2018-10-26 NOTE — TELEPHONE ENCOUNTER
I did notify patient he was due for prevnar 13 and then to wait 6-12 months for PPSV23. Patient agreeable will call shot room or go to [facility]

## 2019-01-01 ENCOUNTER — TELEPHONE (OUTPATIENT)
Dept: ONCOLOGY | Facility: OTHER | Age: 69
End: 2019-01-01

## 2019-01-01 ENCOUNTER — HOSPITAL ENCOUNTER (OUTPATIENT)
Dept: CT IMAGING | Facility: HOSPITAL | Age: 69
Discharge: HOME | End: 2019-05-20
Attending: INTERNAL MEDICINE | Admitting: INTERNAL MEDICINE
Payer: MEDICARE

## 2019-01-01 ENCOUNTER — HOSPITAL ENCOUNTER (OUTPATIENT)
Dept: PET IMAGING | Facility: HOSPITAL | Age: 69
Discharge: HOME | End: 2019-05-08
Attending: INTERNAL MEDICINE | Admitting: INTERNAL MEDICINE
Payer: MEDICARE

## 2019-01-01 ENCOUNTER — ONCOLOGY VISIT (OUTPATIENT)
Dept: ONCOLOGY | Facility: OTHER | Age: 69
End: 2019-01-01
Attending: INTERNAL MEDICINE
Payer: COMMERCIAL

## 2019-01-01 ENCOUNTER — ONCOLOGY VISIT (OUTPATIENT)
Dept: ONCOLOGY | Facility: OTHER | Age: 69
End: 2019-01-01
Attending: INTERNAL MEDICINE
Payer: MEDICARE

## 2019-01-01 ENCOUNTER — OFFICE VISIT (OUTPATIENT)
Dept: FAMILY MEDICINE | Facility: OTHER | Age: 69
End: 2019-01-01
Attending: FAMILY MEDICINE
Payer: COMMERCIAL

## 2019-01-01 ENCOUNTER — TELEPHONE (OUTPATIENT)
Dept: PEDIATRICS | Facility: OTHER | Age: 69
End: 2019-01-01

## 2019-01-01 ENCOUNTER — DOCUMENTATION ONLY (OUTPATIENT)
Dept: INFUSION THERAPY | Facility: OTHER | Age: 69
End: 2019-01-01

## 2019-01-01 ENCOUNTER — ONCOLOGY VISIT (OUTPATIENT)
Dept: ONCOLOGY | Facility: OTHER | Age: 69
End: 2019-01-01
Attending: NURSE PRACTITIONER
Payer: MEDICARE

## 2019-01-01 ENCOUNTER — ALLIED HEALTH/NURSE VISIT (OUTPATIENT)
Dept: ALLERGY | Facility: OTHER | Age: 69
End: 2019-01-01
Attending: INTERNAL MEDICINE
Payer: MEDICARE

## 2019-01-01 VITALS
TEMPERATURE: 97 F | OXYGEN SATURATION: 98 % | BODY MASS INDEX: 23.52 KG/M2 | RESPIRATION RATE: 20 BRPM | DIASTOLIC BLOOD PRESSURE: 80 MMHG | HEART RATE: 78 BPM | WEIGHT: 168 LBS | SYSTOLIC BLOOD PRESSURE: 124 MMHG | HEIGHT: 71 IN

## 2019-01-01 VITALS
BODY MASS INDEX: 23.1 KG/M2 | OXYGEN SATURATION: 98 % | WEIGHT: 165 LBS | DIASTOLIC BLOOD PRESSURE: 82 MMHG | HEIGHT: 71 IN | HEART RATE: 62 BPM | TEMPERATURE: 98.9 F | SYSTOLIC BLOOD PRESSURE: 138 MMHG

## 2019-01-01 VITALS
WEIGHT: 165 LBS | HEART RATE: 50 BPM | OXYGEN SATURATION: 99 % | SYSTOLIC BLOOD PRESSURE: 100 MMHG | TEMPERATURE: 97.4 F | RESPIRATION RATE: 18 BRPM | BODY MASS INDEX: 23.1 KG/M2 | DIASTOLIC BLOOD PRESSURE: 60 MMHG | HEIGHT: 71 IN

## 2019-01-01 VITALS
HEART RATE: 60 BPM | TEMPERATURE: 98.1 F | BODY MASS INDEX: 23.1 KG/M2 | HEIGHT: 71 IN | OXYGEN SATURATION: 97 % | WEIGHT: 165 LBS | DIASTOLIC BLOOD PRESSURE: 78 MMHG | SYSTOLIC BLOOD PRESSURE: 130 MMHG

## 2019-01-01 VITALS
DIASTOLIC BLOOD PRESSURE: 78 MMHG | BODY MASS INDEX: 23.1 KG/M2 | OXYGEN SATURATION: 97 % | SYSTOLIC BLOOD PRESSURE: 126 MMHG | HEIGHT: 71 IN | WEIGHT: 165 LBS | TEMPERATURE: 96.9 F | HEART RATE: 59 BPM

## 2019-01-01 VITALS
WEIGHT: 166.6 LBS | RESPIRATION RATE: 12 BRPM | HEART RATE: 65 BPM | TEMPERATURE: 97.6 F | OXYGEN SATURATION: 96 % | BODY MASS INDEX: 23.57 KG/M2 | DIASTOLIC BLOOD PRESSURE: 64 MMHG | SYSTOLIC BLOOD PRESSURE: 112 MMHG

## 2019-01-01 DIAGNOSIS — C91.10 CLL (CHRONIC LYMPHOCYTIC LEUKEMIA) (H): Primary | ICD-10-CM

## 2019-01-01 DIAGNOSIS — C91.10 CLL (CHRONIC LYMPHOCYTIC LEUKEMIA) (H): ICD-10-CM

## 2019-01-01 DIAGNOSIS — E87.5 HYPERKALEMIA: ICD-10-CM

## 2019-01-01 DIAGNOSIS — Z23 NEED FOR VACCINATION: Primary | ICD-10-CM

## 2019-01-01 DIAGNOSIS — J01.90 ACUTE SINUSITIS WITH SYMPTOMS > 10 DAYS: Primary | ICD-10-CM

## 2019-01-01 DIAGNOSIS — I10 ESSENTIAL HYPERTENSION: ICD-10-CM

## 2019-01-01 LAB
ALBUMIN SERPL-MCNC: 4.2 G/DL (ref 3.4–5)
ALBUMIN SERPL-MCNC: 4.3 G/DL (ref 3.4–5)
ALBUMIN SERPL-MCNC: 4.5 G/DL (ref 3.4–5)
ALP SERPL-CCNC: 84 U/L (ref 40–150)
ALP SERPL-CCNC: 84 U/L (ref 40–150)
ALP SERPL-CCNC: 88 U/L (ref 40–150)
ALT SERPL W P-5'-P-CCNC: 37 U/L (ref 0–70)
ALT SERPL W P-5'-P-CCNC: 39 U/L (ref 0–70)
ALT SERPL W P-5'-P-CCNC: 43 U/L (ref 0–70)
ANION GAP SERPL CALCULATED.3IONS-SCNC: 3 MMOL/L (ref 3–14)
ANION GAP SERPL CALCULATED.3IONS-SCNC: 4 MMOL/L (ref 3–14)
ANION GAP SERPL CALCULATED.3IONS-SCNC: 5 MMOL/L (ref 3–14)
ANION GAP SERPL CALCULATED.3IONS-SCNC: 7 MMOL/L (ref 3–14)
ANION GAP SERPL CALCULATED.3IONS-SCNC: 7 MMOL/L (ref 3–14)
AST SERPL W P-5'-P-CCNC: 40 U/L (ref 0–45)
AST SERPL W P-5'-P-CCNC: 44 U/L (ref 0–45)
AST SERPL W P-5'-P-CCNC: 50 U/L (ref 0–45)
BASOPHILS # BLD AUTO: 0 10E9/L (ref 0–0.2)
BASOPHILS NFR BLD AUTO: 0 %
BILIRUB SERPL-MCNC: 0.5 MG/DL (ref 0.2–1.3)
BILIRUB SERPL-MCNC: 0.8 MG/DL (ref 0.2–1.3)
BILIRUB SERPL-MCNC: 1.1 MG/DL (ref 0.2–1.3)
BUN SERPL-MCNC: 15 MG/DL (ref 7–30)
BUN SERPL-MCNC: 20 MG/DL (ref 7–30)
BUN SERPL-MCNC: 22 MG/DL (ref 7–30)
BUN SERPL-MCNC: 22 MG/DL (ref 7–30)
BUN SERPL-MCNC: 23 MG/DL (ref 7–30)
CALCIUM SERPL-MCNC: 8.4 MG/DL (ref 8.5–10.1)
CALCIUM SERPL-MCNC: 8.6 MG/DL (ref 8.5–10.1)
CALCIUM SERPL-MCNC: 8.7 MG/DL (ref 8.5–10.1)
CALCIUM SERPL-MCNC: 8.7 MG/DL (ref 8.5–10.1)
CALCIUM SERPL-MCNC: 8.8 MG/DL (ref 8.5–10.1)
CHLORIDE SERPL-SCNC: 104 MMOL/L (ref 94–109)
CHLORIDE SERPL-SCNC: 105 MMOL/L (ref 94–109)
CHLORIDE SERPL-SCNC: 105 MMOL/L (ref 94–109)
CHLORIDE SERPL-SCNC: 106 MMOL/L (ref 94–109)
CHLORIDE SERPL-SCNC: 106 MMOL/L (ref 94–109)
CO2 SERPL-SCNC: 25 MMOL/L (ref 20–32)
CO2 SERPL-SCNC: 27 MMOL/L (ref 20–32)
CO2 SERPL-SCNC: 28 MMOL/L (ref 20–32)
CO2 SERPL-SCNC: 29 MMOL/L (ref 20–32)
CO2 SERPL-SCNC: 30 MMOL/L (ref 20–32)
CREAT SERPL-MCNC: 0.97 MG/DL (ref 0.66–1.25)
CREAT SERPL-MCNC: 0.97 MG/DL (ref 0.66–1.25)
CREAT SERPL-MCNC: 0.98 MG/DL (ref 0.66–1.25)
CREAT SERPL-MCNC: 0.99 MG/DL (ref 0.66–1.25)
CREAT SERPL-MCNC: 1.04 MG/DL (ref 0.66–1.25)
DIFFERENTIAL METHOD BLD: ABNORMAL
EOSINOPHIL # BLD AUTO: 0 10E9/L (ref 0–0.7)
EOSINOPHIL # BLD AUTO: 1.4 10E9/L (ref 0–0.7)
EOSINOPHIL # BLD AUTO: 1.5 10E9/L (ref 0–0.7)
EOSINOPHIL NFR BLD AUTO: 0 %
EOSINOPHIL NFR BLD AUTO: 1 %
EOSINOPHIL NFR BLD AUTO: 1 %
ERYTHROCYTE [DISTWIDTH] IN BLOOD BY AUTOMATED COUNT: 14.5 % (ref 10–15)
ERYTHROCYTE [DISTWIDTH] IN BLOOD BY AUTOMATED COUNT: 14.6 % (ref 10–15)
ERYTHROCYTE [DISTWIDTH] IN BLOOD BY AUTOMATED COUNT: 14.9 % (ref 10–15)
ERYTHROCYTE [DISTWIDTH] IN BLOOD BY AUTOMATED COUNT: 17.1 % (ref 10–15)
ERYTHROCYTE [DISTWIDTH] IN BLOOD BY AUTOMATED COUNT: 22.1 % (ref 10–15)
ERYTHROCYTE [DISTWIDTH] IN BLOOD BY AUTOMATED COUNT: ABNORMAL % (ref 10–15)
GFR SERPL CREATININE-BSD FRML MDRD: 73 ML/MIN/{1.73_M2}
GFR SERPL CREATININE-BSD FRML MDRD: 78 ML/MIN/{1.73_M2}
GFR SERPL CREATININE-BSD FRML MDRD: 78 ML/MIN/{1.73_M2}
GFR SERPL CREATININE-BSD FRML MDRD: 79 ML/MIN/{1.73_M2}
GFR SERPL CREATININE-BSD FRML MDRD: 79 ML/MIN/{1.73_M2}
GLUCOSE SERPL-MCNC: 101 MG/DL (ref 70–99)
GLUCOSE SERPL-MCNC: 92 MG/DL (ref 70–99)
GLUCOSE SERPL-MCNC: 93 MG/DL (ref 70–99)
GLUCOSE SERPL-MCNC: 96 MG/DL (ref 70–99)
GLUCOSE SERPL-MCNC: 99 MG/DL (ref 70–99)
HCT VFR BLD AUTO: 35.8 % (ref 40–53)
HCT VFR BLD AUTO: 36 % (ref 40–53)
HCT VFR BLD AUTO: 36.4 % (ref 40–53)
HCT VFR BLD AUTO: 37 % (ref 40–53)
HCT VFR BLD AUTO: 38.5 % (ref 40–53)
HCT VFR BLD AUTO: 39.5 % (ref 40–53)
HGB BLD-MCNC: 12.1 G/DL (ref 13.3–17.7)
HGB BLD-MCNC: 12.1 G/DL (ref 13.3–17.7)
HGB BLD-MCNC: 12.3 G/DL (ref 13.3–17.7)
HGB BLD-MCNC: 12.4 G/DL (ref 13.3–17.7)
HGB BLD-MCNC: 12.6 G/DL (ref 13.3–17.7)
HGB BLD-MCNC: 12.9 G/DL (ref 13.3–17.7)
LDH SERPL L TO P-CCNC: 404 U/L (ref 85–227)
LDH SERPL L TO P-CCNC: 436 U/L (ref 85–227)
LDH SERPL L TO P-CCNC: 645 U/L (ref 85–227)
LYMPHOCYTES # BLD AUTO: 114 10E9/L (ref 0.8–5.3)
LYMPHOCYTES # BLD AUTO: 130.5 10E9/L (ref 0.8–5.3)
LYMPHOCYTES # BLD AUTO: 136.4 10E9/L (ref 0.8–5.3)
LYMPHOCYTES # BLD AUTO: 141.2 10E9/L (ref 0.8–5.3)
LYMPHOCYTES # BLD AUTO: 141.8 10E9/L (ref 0.8–5.3)
LYMPHOCYTES # BLD AUTO: 146.4 10E9/L (ref 0.8–5.3)
LYMPHOCYTES NFR BLD AUTO: 84 %
LYMPHOCYTES NFR BLD AUTO: 86 %
LYMPHOCYTES NFR BLD AUTO: 92 %
LYMPHOCYTES NFR BLD AUTO: 94 %
LYMPHOCYTES NFR BLD AUTO: 95 %
LYMPHOCYTES NFR BLD AUTO: 97 %
MCH RBC QN AUTO: 29.8 PG (ref 26.5–33)
MCH RBC QN AUTO: 30.1 PG (ref 26.5–33)
MCH RBC QN AUTO: 30.3 PG (ref 26.5–33)
MCH RBC QN AUTO: 31 PG (ref 26.5–33)
MCH RBC QN AUTO: 33.1 PG (ref 26.5–33)
MCH RBC QN AUTO: 35.6 PG (ref 26.5–33)
MCHC RBC AUTO-ENTMCNC: 32.7 G/DL (ref 31.5–36.5)
MCHC RBC AUTO-ENTMCNC: 32.7 G/DL (ref 31.5–36.5)
MCHC RBC AUTO-ENTMCNC: 33.2 G/DL (ref 31.5–36.5)
MCHC RBC AUTO-ENTMCNC: 33.2 G/DL (ref 31.5–36.5)
MCHC RBC AUTO-ENTMCNC: 33.8 G/DL (ref 31.5–36.5)
MCHC RBC AUTO-ENTMCNC: 34.4 G/DL (ref 31.5–36.5)
MCV RBC AUTO: 103 FL (ref 78–100)
MCV RBC AUTO: 91 FL (ref 78–100)
MCV RBC AUTO: 91 FL (ref 78–100)
MCV RBC AUTO: 92 FL (ref 78–100)
MCV RBC AUTO: 93 FL (ref 78–100)
MCV RBC AUTO: 98 FL (ref 78–100)
MONOCYTES # BLD AUTO: 0 10E9/L (ref 0–1.3)
MONOCYTES # BLD AUTO: 1.4 10E9/L (ref 0–1.3)
MONOCYTES # BLD AUTO: 1.5 10E9/L (ref 0–1.3)
MONOCYTES # BLD AUTO: 13.6 10E9/L (ref 0–1.3)
MONOCYTES # BLD AUTO: 5.4 10E9/L (ref 0–1.3)
MONOCYTES # BLD AUTO: 5.7 10E9/L (ref 0–1.3)
MONOCYTES NFR BLD AUTO: 0 %
MONOCYTES NFR BLD AUTO: 1 %
MONOCYTES NFR BLD AUTO: 1 %
MONOCYTES NFR BLD AUTO: 4 %
MONOCYTES NFR BLD AUTO: 4 %
MONOCYTES NFR BLD AUTO: 8 %
NEUTROPHILS # BLD AUTO: 10.2 10E9/L (ref 1.6–8.3)
NEUTROPHILS # BLD AUTO: 14.9 10E9/L (ref 1.6–8.3)
NEUTROPHILS # BLD AUTO: 2.9 10E9/L (ref 1.6–8.3)
NEUTROPHILS # BLD AUTO: 5.7 10E9/L (ref 1.6–8.3)
NEUTROPHILS # BLD AUTO: 5.7 10E9/L (ref 1.6–8.3)
NEUTROPHILS # BLD AUTO: 7.5 10E9/L (ref 1.6–8.3)
NEUTROPHILS NFR BLD AUTO: 11 %
NEUTROPHILS NFR BLD AUTO: 2 %
NEUTROPHILS NFR BLD AUTO: 4 %
NEUTROPHILS NFR BLD AUTO: 4 %
NEUTROPHILS NFR BLD AUTO: 5 %
NEUTROPHILS NFR BLD AUTO: 6 %
PHOSPHATE SERPL-MCNC: 3.2 MG/DL (ref 2.5–4.5)
PLATELET # BLD AUTO: 106 10E9/L (ref 150–450)
PLATELET # BLD AUTO: 106 10E9/L (ref 150–450)
PLATELET # BLD AUTO: 119 10E9/L (ref 150–450)
PLATELET # BLD AUTO: 125 10E9/L (ref 150–450)
PLATELET # BLD AUTO: 133 10E9/L (ref 150–450)
PLATELET # BLD AUTO: 145 10E9/L (ref 150–450)
PLATELET # BLD EST: ABNORMAL 10*3/UL
POTASSIUM SERPL-SCNC: 4.5 MMOL/L (ref 3.4–5.3)
POTASSIUM SERPL-SCNC: 5 MMOL/L (ref 3.4–5.3)
POTASSIUM SERPL-SCNC: 5.2 MMOL/L (ref 3.4–5.3)
POTASSIUM SERPL-SCNC: 5.8 MMOL/L (ref 3.4–5.3)
POTASSIUM SERPL-SCNC: 6 MMOL/L (ref 3.4–5.3)
PROT SERPL-MCNC: 6.9 G/DL (ref 6.8–8.8)
PROT SERPL-MCNC: 7 G/DL (ref 6.8–8.8)
PROT SERPL-MCNC: 7.1 G/DL (ref 6.8–8.8)
RBC # BLD AUTO: 3.48 10E12/L (ref 4.4–5.9)
RBC # BLD AUTO: 3.66 10E12/L (ref 4.4–5.9)
RBC # BLD AUTO: 3.97 10E12/L (ref 4.4–5.9)
RBC # BLD AUTO: 3.99 10E12/L (ref 4.4–5.9)
RBC # BLD AUTO: 4.23 10E12/L (ref 4.4–5.9)
RBC # BLD AUTO: 4.28 10E12/L (ref 4.4–5.9)
RBC MORPH BLD: ABNORMAL
SODIUM SERPL-SCNC: 137 MMOL/L (ref 133–144)
SODIUM SERPL-SCNC: 137 MMOL/L (ref 133–144)
SODIUM SERPL-SCNC: 138 MMOL/L (ref 133–144)
SODIUM SERPL-SCNC: 139 MMOL/L (ref 133–144)
SODIUM SERPL-SCNC: 140 MMOL/L (ref 133–144)
URATE SERPL-MCNC: 6.1 MG/DL (ref 3.5–7.2)
WBC # BLD AUTO: 135.7 10E9/L (ref 4–11)
WBC # BLD AUTO: 141.8 10E9/L (ref 4–11)
WBC # BLD AUTO: 143.6 10E9/L (ref 4–11)
WBC # BLD AUTO: 146.2 10E9/L (ref 4–11)
WBC # BLD AUTO: 150.2 10E9/L (ref 4–11)
WBC # BLD AUTO: 170.2 10E9/L (ref 4–11)

## 2019-01-01 PROCEDURE — 99213 OFFICE O/P EST LOW 20 MIN: CPT | Performed by: NURSE PRACTITIONER

## 2019-01-01 PROCEDURE — 36415 COLL VENOUS BLD VENIPUNCTURE: CPT | Mod: ZL | Performed by: INTERNAL MEDICINE

## 2019-01-01 PROCEDURE — 85025 COMPLETE CBC W/AUTO DIFF WBC: CPT | Mod: ZL | Performed by: NURSE PRACTITIONER

## 2019-01-01 PROCEDURE — G0463 HOSPITAL OUTPT CLINIC VISIT: HCPCS

## 2019-01-01 PROCEDURE — 80053 COMPREHEN METABOLIC PANEL: CPT | Mod: ZL | Performed by: INTERNAL MEDICINE

## 2019-01-01 PROCEDURE — 34300033 ZZH RX 343: Performed by: INTERNAL MEDICINE

## 2019-01-01 PROCEDURE — 78815 PET IMAGE W/CT SKULL-THIGH: CPT | Mod: TC,PS

## 2019-01-01 PROCEDURE — 99213 OFFICE O/P EST LOW 20 MIN: CPT | Performed by: FAMILY MEDICINE

## 2019-01-01 PROCEDURE — 36415 COLL VENOUS BLD VENIPUNCTURE: CPT | Mod: ZL | Performed by: NURSE PRACTITIONER

## 2019-01-01 PROCEDURE — 25500064 ZZH RX 255 OP 636: Performed by: INTERNAL MEDICINE

## 2019-01-01 PROCEDURE — 80048 BASIC METABOLIC PNL TOTAL CA: CPT | Mod: ZL | Performed by: NURSE PRACTITIONER

## 2019-01-01 PROCEDURE — 99213 OFFICE O/P EST LOW 20 MIN: CPT | Performed by: INTERNAL MEDICINE

## 2019-01-01 PROCEDURE — 99214 OFFICE O/P EST MOD 30 MIN: CPT | Performed by: INTERNAL MEDICINE

## 2019-01-01 PROCEDURE — 80048 BASIC METABOLIC PNL TOTAL CA: CPT | Performed by: INTERNAL MEDICINE

## 2019-01-01 PROCEDURE — 85025 COMPLETE CBC W/AUTO DIFF WBC: CPT | Mod: ZL | Performed by: INTERNAL MEDICINE

## 2019-01-01 PROCEDURE — 84100 ASSAY OF PHOSPHORUS: CPT | Mod: ZL | Performed by: NURSE PRACTITIONER

## 2019-01-01 PROCEDURE — 74177 CT ABD & PELVIS W/CONTRAST: CPT | Mod: TC

## 2019-01-01 PROCEDURE — 84550 ASSAY OF BLOOD/URIC ACID: CPT | Mod: ZL | Performed by: NURSE PRACTITIONER

## 2019-01-01 PROCEDURE — 90732 PPSV23 VACC 2 YRS+ SUBQ/IM: CPT

## 2019-01-01 PROCEDURE — 83615 LACTATE (LD) (LDH) ENZYME: CPT | Mod: ZL | Performed by: NURSE PRACTITIONER

## 2019-01-01 PROCEDURE — 83615 LACTATE (LD) (LDH) ENZYME: CPT | Mod: ZL | Performed by: INTERNAL MEDICINE

## 2019-01-01 PROCEDURE — 36415 COLL VENOUS BLD VENIPUNCTURE: CPT | Performed by: INTERNAL MEDICINE

## 2019-01-01 PROCEDURE — 80053 COMPREHEN METABOLIC PANEL: CPT | Mod: ZL | Performed by: NURSE PRACTITIONER

## 2019-01-01 PROCEDURE — G0009 ADMIN PNEUMOCOCCAL VACCINE: HCPCS

## 2019-01-01 PROCEDURE — A9552 F18 FDG: HCPCS | Performed by: INTERNAL MEDICINE

## 2019-01-01 RX ORDER — HYDROXYUREA 500 MG/1
1000 CAPSULE ORAL DAILY
Qty: 60 CAPSULE | Refills: 3 | Status: SHIPPED | OUTPATIENT
Start: 2019-01-01

## 2019-01-01 RX ORDER — IOPAMIDOL 612 MG/ML
100 INJECTION, SOLUTION INTRAVASCULAR ONCE
Status: COMPLETED | OUTPATIENT
Start: 2019-01-01 | End: 2019-01-01

## 2019-01-01 RX ORDER — SELENIUM 200 MCG
2 TABLET ORAL DAILY
COMMUNITY

## 2019-01-01 RX ORDER — VITS A,C,E/LUTEIN/MINERALS 300MCG-200
1 TABLET ORAL DAILY
COMMUNITY

## 2019-01-01 RX ORDER — LISINOPRIL 30 MG/1
30 TABLET ORAL DAILY
Qty: 15 TABLET | Refills: 0 | Status: SHIPPED | OUTPATIENT
Start: 2019-01-01

## 2019-01-01 RX ORDER — AMOXICILLIN 875 MG
875 TABLET ORAL 2 TIMES DAILY
Qty: 20 TABLET | Refills: 0 | Status: SHIPPED | OUTPATIENT
Start: 2019-01-01 | End: 2019-01-01

## 2019-01-01 RX ORDER — LISINOPRIL 30 MG/1
30 TABLET ORAL DAILY
Qty: 90 TABLET | Refills: 0 | OUTPATIENT
Start: 2019-01-01

## 2019-01-01 RX ORDER — LISINOPRIL 30 MG/1
30 TABLET ORAL DAILY
Qty: 90 TABLET | Refills: 0 | Status: SHIPPED | OUTPATIENT
Start: 2019-01-01 | End: 2019-01-01

## 2019-01-01 RX ADMIN — IOPAMIDOL 100 ML: 612 INJECTION, SOLUTION INTRAVENOUS at 08:44

## 2019-01-01 RX ADMIN — DIATRIZOATE MEGLUMINE AND DIATRIZOATE SODIUM 45 ML: 660; 100 SOLUTION ORAL; RECTAL at 08:44

## 2019-01-01 RX ADMIN — FLUDEOXYGLUCOSE F-18 12.9 MCI.: 500 INJECTION, SOLUTION INTRAVENOUS at 10:35

## 2019-01-01 ASSESSMENT — PATIENT HEALTH QUESTIONNAIRE - PHQ9
SUM OF ALL RESPONSES TO PHQ QUESTIONS 1-9: 0

## 2019-01-01 ASSESSMENT — MIFFLIN-ST. JEOR
SCORE: 1532.63
SCORE: 1532.63
SCORE: 1546.23
SCORE: 1532.63
SCORE: 1527.63

## 2019-01-01 ASSESSMENT — PAIN SCALES - GENERAL
PAINLEVEL: NO PAIN (0)

## 2019-01-31 NOTE — PROGRESS NOTES
Oncology Follow-up Visit:  February 1, 2019    Reason for Visit:  Patient presents with:  RECHECK: Follow up CLL (chronic lymphocytic leukemia)      Nursing Note and documentation reviewed: yes    HPI:  This is a 68-year-old male patient who presents to the oncology clinic today in routine follow-up of CLL.  Extensive workup was completed in August of 2013 with initial consult. He has been followed with surveillance.       He presents to the clinic today without complaints.  Denies any issues with fevers, night sweats, weight loss or decreased appetite.  Denies any concerning lumps or bumps.    Oncologic History:     Damien was diagnosed with CLL in August 2013 after his routine physical with Dr. Kenney showed an elevation of his white blood cell count on his CBC. CBC at that time revealed WBC of 18.9 with 69.6% lymphocytes, H&H was 14.8 and 42.3, platelet count was 158,000. Peripheral blood smear showed lymphocytosis consistent with chronic lymphocytic leukemia/small lymphocytic lymphoma and mild thrombocytopenia. Peripheral blood flow for cytometry showed an abnormal B cell population consistent with B-cell CLL. The patient was CD5-positive, CD10 negative as well as CD23 positive with 47% B cells expressed at C38 above levels of controls, 80% B cells had levels of cytoplasmic 70 comparable to T lymphocytes and assessment was sent for cytogenetics and FISH. Serum protein electrophoresis was negative. The FISH study was not able to be completed at that time.   He was then referred to Dr. Shantanu Stubbs at the [facility] who saw the patient on 10/16/2013. In review of patient's studies, he felt the patient had stage II CLL as he did have mild splenomegaly on his PET scan. He had concerns about flow cytometry findings as it pointed to a more aggressive disease and the FISH testing was repeated and showed predominance of trisomy 12 which was consistent with intermediate prognosis. It was felt the major  determinant of prognosis in CLL would be the behavior of the disease over the course of the next several months to a year and in subsequent followups patient has been essentially asymptomatic. Dr. Tidwell recommended CBCs every 3 months along with physical exams for nodes and organomegaly and recommended a CT scan yearly.       In May 2017, patient's WBC and lymphocytes increased and imaging was completed.  CT scan of the abdomen and pelvis showed a mass and he was sent to the [facility] for evaluation.  He underwent surgical exploration on 6/8/2017 by Dr. Goodrich and no mass was found.  Biopsies were completed of some prominent nodes with pathology consistent with his CLL diagnoses.  He followed up with Dr. Goodrich on 6/28/17 and repeat CT scan was done which showed no mass.      Current Chemo Regime/TX: n/a  Current Cycle: n/a  # of completed cycles: n/a      Previous treatment: n/a     Past Medical History:   Diagnosis Date     Chronic lymphocytic leukemia (CLL), T-cell (H)     white counts in the 34,000 to 40,000      Colonic polyp 08/07/2014    tubular adenoma     Elevated blood pressure reading without diagnosis of hypertension 10/16/2002     Fracture of metatarsal bone(s), closed 10/29/2001    right 5th metatarsal bone     Hypertension      Other orthopedic aftercare(V54.89) 11/08/2001     Pericarditis     2005     Routine general medical examination at [facility] 12/01/2000     Special screening for malignant neoplasms, colon 02/04/2004     Tubular adenoma of colon 02/2014    single        Past Surgical History:   Procedure Laterality Date     BACK SURGERY  1997    L4-L5 partial laminectomy     BIOPSY  06/08/2017    abdomen     COLONOSCOPY  02/04/2004    No polyps     COLONOSCOPY  8/4/2014    Procedure: COLONOSCOPY;  Surgeon: Meliza Morales MD;  Location: [facility] OR     COLONOSCOPY N/A 8/13/2018    Procedure: COLONOSCOPY;  COLONOSCOPY;  Surgeon: Ministerio Henry MD;  Location: [facility] OR      LEFT  HEART CATHETERIZATION  05/10/2005    Normal angiogram     LAMINECTOMY CERIVCAL POSTERIOR ONE LEVEL  1997     LAPAROSCOPY DIAGNOSTIC (GENERAL) N/A 6/8/2017    Procedure: LAPAROSCOPY DIAGNOSTIC (GENERAL);  Diagnostic Laparoscopy, Laparatomy Open resection of Mesenteric Lymph Node ;  Surgeon: Melba Goodrich MD;  Location:  OR       Family History   Problem Relation Age of Onset     Heart Disease Mother         stents the 2nd time, CABG     C.A.D. Mother      Coronary Artery Disease Mother      Cerebrovascular Disease Father 58     Heart Disease Father      Hyperlipidemia Father      Unknown/Adopted Maternal Grandmother      Unknown/Adopted Maternal Grandfather      Unknown/Adopted Paternal Grandmother      Unknown/Adopted Paternal Grandfather      Hypertension Brother      Other Cancer Brother      Hypertension Brother      Other Cancer Brother      Cancer No family hx of         skin cancer     Diabetes No family hx of      Breast Cancer No family hx of      Colon Cancer No family hx of      Anesthesia Reaction No family hx of      Asthma No family hx of      Thyroid Disease No family hx of        Social History     Socioeconomic History     Marital status:      Spouse name: Not on file     Number of children: Not on file     Years of education: Not on file     Highest education level: Not on file   Social Needs     Financial resource strain: Not on file     Food insecurity - worry: Not on file     Food insecurity - inability: Not on file     Transportation needs - medical: Not on file     Transportation needs - non-medical: Not on file   Occupational History     Not on file   Tobacco Use     Smoking status: Never Smoker     Smokeless tobacco: Never Used   Substance and Sexual Activity     Alcohol use: Yes     Comment: red wine nightly     Drug use: No     Sexual activity: Yes     Partners: Female   Other Topics Concern     Parent/sibling w/ CABG, MI or angioplasty before 65F 55M? Yes     Comment:  "father   Social History Narrative     Not on file       Current Outpatient Medications   Medication     aspirin 81 MG tablet     B Complex-Biotin-FA (MULTI-B COMPLEX PO)     calcium-vitamin D (CALCIUM 600/VITAMIN D) 600-400 MG-UNIT per tablet     Coenzyme Q10 (COQ10) 100 MG CAPS     Ferrous Sulfate (IRON) 325 (65 FE) MG tablet     fish oil-omega-3 fatty acids (FISH OIL) 1000 MG capsule     Garlic 1000 MG CAPS     Glucosamine-Chondroitin (GLUCOSAMINE CHONDR COMPLEX PO)     lisinopril (PRINIVIL,ZESTRIL) 30 MG tablet     MAGNESIUM OXIDE PO     multivitamin (OCUVITE) TABS     No current facility-administered medications for this visit.         No Known Allergies    Review Of Systems:  Constitutional:    denies fever, weight changes, chills, and night sweats.  Eyes:    denies blurred or double vision  Ears/Nose/Throat:   denies ear pain, nose problems, difficulty swallowing  Respiratory:   denies shortness of breath, cough   Skin:   denies rash, lesions  Cardiovascular:   denies chest pain, palpitations, edema  Gastrointestinal:   denies abdominal pain, bloating, nausea, vomiting, early satiety  Genitourinary:   denies difficulty with urination, blood in urine  Musculoskeletal:    denies new muscle pain, bone pain  Neurologic:   denies lightheadedness, headaches, numbness or tingling  Psychiatric:   denies anxiety, depression  Hematologic/Lymphatic/Immunologic:   denies easy bruising, easy bleeding, lumps or bumps noted  Endocrine:   Denies increased thirst      Physical Exam:  /80   Pulse 78   Temp 97  F (36.1  C) (Tympanic)   Resp 20   Ht 1.791 m (5' 10.5\")   Wt 76.2 kg (168 lb)   SpO2 98%   BMI 23.76 kg/m      GENERAL APPEARANCE: Healthy, alert and in no acute distress.  HEENT: Normocephalic, Sclerae anicteric. Oropharynx without ulcers, lesions, or thrush.  NECK:   No asymmetry or masses, no thyromegaly.  LYMPHATICS: No palpable cervical, supraclavicular, axillary, or inguinal nodes   RESP: Lungs clear " to auscultation bilaterally, respirations regular and easy  CARDIOVASCULAR: Regular rate and rhythm. Normal S1, S2; no murmur, gallop, or rub.  ABDOMEN: Soft, nontender. Bowel sounds auscultated all 4 quadrants. No palpable organomegaly or masses.  MUSCULOSKELETAL: Extremities without gross deformities noted. No edema of bilateral lower extremities.  NEURO: Alert and oriented x 3.  Gait steady.  PSYCHIATRIC: Mentation and affect appear normal.  Mood appropriate.    Laboratory:  Results for orders placed or performed in visit on 02/01/19   Lactate Dehydrogenase   Result Value Ref Range    Lactate Dehydrogenase 645 (H) 85 - 227 U/L   Comprehensive metabolic panel   Result Value Ref Range    Sodium 137 133 - 144 mmol/L    Potassium 6.0 (H) 3.4 - 5.3 mmol/L    Chloride 105 94 - 109 mmol/L    Carbon Dioxide 25 20 - 32 mmol/L    Anion Gap 7 3 - 14 mmol/L    Glucose 92 70 - 99 mg/dL    Urea Nitrogen 22 7 - 30 mg/dL    Creatinine 0.97 0.66 - 1.25 mg/dL    GFR Estimate 79 >60 mL/min/[1.73_m2]    GFR Estimate If Black >90 >60 mL/min/[1.73_m2]    Calcium 8.4 (L) 8.5 - 10.1 mg/dL    Bilirubin Total 0.5 0.2 - 1.3 mg/dL    Albumin 4.3 3.4 - 5.0 g/dL    Protein Total 7.0 6.8 - 8.8 g/dL    Alkaline Phosphatase 84 40 - 150 U/L    ALT 37 0 - 70 U/L    AST 50 (H) 0 - 45 U/L   CBC with platelets differential   Result Value Ref Range    .2 (HH) 4.0 - 11.0 10e9/L    RBC Count 4.28 (L) 4.4 - 5.9 10e12/L    Hemoglobin 12.9 (L) 13.3 - 17.7 g/dL    Hematocrit 39.5 (L) 40.0 - 53.0 %    MCV 92 78 - 100 fl    MCH 30.1 26.5 - 33.0 pg    MCHC 32.7 31.5 - 36.5 g/dL    RDW 14.5 10.0 - 15.0 %    Platelet Count 125 (L) 150 - 450 10e9/L    Diff Method Manual Differential     % Neutrophils 5.0 %    % Lymphocytes 94.0 %    % Monocytes 0.0 %    % Eosinophils 1.0 %    % Basophils 0.0 %    Absolute Neutrophil 7.5 1.6 - 8.3 10e9/L    Absolute Lymphocytes 141.2 (H) 0.8 - 5.3 10e9/L    Absolute Monocytes 0.0 0.0 - 1.3 10e9/L    Absolute Eosinophils 1.5  (H) 0.0 - 0.7 10e9/L    Absolute Basophils 0.0 0.0 - 0.2 10e9/L   Phosphorus   Result Value Ref Range    Phosphorus 3.2 2.5 - 4.5 mg/dL   Basic metabolic panel   Result Value Ref Range    Sodium 138 133 - 144 mmol/L    Potassium 5.8 (H) 3.4 - 5.3 mmol/L    Chloride 105 94 - 109 mmol/L    Carbon Dioxide 28 20 - 32 mmol/L    Anion Gap 5 3 - 14 mmol/L    Glucose 96 70 - 99 mg/dL    Urea Nitrogen 22 7 - 30 mg/dL    Creatinine 0.98 0.66 - 1.25 mg/dL    GFR Estimate 78 >60 mL/min/[1.73_m2]    GFR Estimate If Black >90 >60 mL/min/[1.73_m2]    Calcium 8.7 8.5 - 10.1 mg/dL   Uric acid   Result Value Ref Range    Uric Acid 6.1 3.5 - 7.2 mg/dL       Imaging Studies:  None completed for today's visit        ASSESSMENT/PLAN:    #1 CLL: Stage II chronic lymphocytic leukemia. WBC and lymphocytes and LDH are up further.  He continues to be asymptomatic so most likely at this point, will just continue to monitor as again, his hemoglobin is just slightly low and platelets are stable.  I will again discuss with Dr. Booker and get his recommendations.    #2  Hyperkalemia:  6.0 with repeat 5.8.  Likely related to his CLL.  Discussed with Dr. Kenney and he recommended having patient return for a recheck over the weekend and he will address.    I encouraged patient to call with any questions or concerns.       Nicole DEXTER, FNP-BC, AOCNP

## 2019-02-01 NOTE — NURSING NOTE
"Chief Complaint   Patient presents with     RECHECK     Follow up CLL (chronic lymphocytic leukemia)        Initial /80   Pulse 78   Temp 97  F (36.1  C) (Tympanic)   Resp 20   Ht 1.791 m (5' 10.5\")   Wt 76.2 kg (168 lb)   SpO2 98%   BMI 23.76 kg/m   Estimated body mass index is 23.76 kg/m  as calculated from the following:    Height as of this encounter: 1.791 m (5' 10.5\").    Weight as of this encounter: 76.2 kg (168 lb).  Medication Reconciliation: complete.  Immunizations reviewed, advanced directives on file, pain = 0, PHQ9 = 0.    Annalise Calvert LPN    "

## 2019-02-04 NOTE — TELEPHONE ENCOUNTER
Patient had his potassium done 2/3/2019 level 4.5. I notified patient of result. Will await your plan upon review

## 2019-02-05 NOTE — TELEPHONE ENCOUNTER
Patient will follow up in 3 months with DR Booker and labs at clinic, if they continue to be high then will have patient drawn at the hospital lab.

## 2019-02-05 NOTE — TELEPHONE ENCOUNTER
Spoke with Dr. Booker in regards to his WBC and he reviewed.  He would like to see him in 3 months with lab prior.  I called patient and informed him of the plan.  He will have lab the morning of his appointment.  We discussed the normal potassium that resulted when he went to the hospital lab and likely related to the lack of disruption of the tube of blood after it was drawn.  We will have him get his lab in 3 months at the clinic lab and if potassium is again high, we will need to have his labs drawn at the hospital lab in the future in order to disrupt the tube of blood as little as possible related tot he false high readings we've had.  I asked that he call with any issues prior to 3 months.  He is aware the [facility] will call him to schedule.

## 2019-02-25 NOTE — PATIENT INSTRUCTIONS
1.  Nasal saline rinse/spray 2-3x/day (brands:  Ocean, Ayr, Nader-Med, etc)  2.  Afrin nasal spray (only use 3 days, not longer!)  1-2x/day  3.  claritin or zrytec daily (always buy cheapest brand)

## 2019-02-25 NOTE — TELEPHONE ENCOUNTER
Called pt to assess further. Pt c/o swollen glands on both sides of neck, nonproductive cough, sinus congestion, mild headache. Symptoms have been present for approx 10 days. No fever, no chills/bodyaches. Pt reports history of chronic lymphocytic leukemia and states that he has difficulty fighting off infections. PCP out today. Pt scheduled with covering provider.

## 2019-02-25 NOTE — TELEPHONE ENCOUNTER
10:09 AM    Reason for Call: Phone Call    Description: Patient called and states he is having some URI symptoms. Symptoms include: cough,swollen glands, congestion, sore throat, mild headache.  Started about a 1 week ago. Been using Aleve sinus and cold to treat and been some what effective for nasal congestion  Allergies verified with what is one file. [facility] uses [facility] .     Was an appointment offered for this call? No  If yes : Appointment type              Date    Preferred method for responding to this message: Telephone Call  What is your phone number ?[phone]    If we cannot reach you directly, may we leave a detailed response at the number you provided? Yes    Can this message wait until your PCP/provider returns, if available today? Not applicable    Anitha Moses MA

## 2019-02-25 NOTE — NURSING NOTE
"Chief Complaint   Patient presents with     URI     Sinus Problem       Initial /64 (BP Location: Right arm, Patient Position: Sitting, Cuff Size: Adult Regular)   Pulse 65   Temp 97.6  F (36.4  C) (Tympanic)   Resp 12   Wt 75.6 kg (166 lb 9.6 oz)   SpO2 96%   BMI 23.57 kg/m   Estimated body mass index is 23.57 kg/m  as calculated from the following:    Height as of 2/1/19: 1.791 m (5' 10.5\").    Weight as of this encounter: 75.6 kg (166 lb 9.6 oz).  Medication Reconciliation: complete    Jessa Behrman, LPN  "

## 2019-02-25 NOTE — TELEPHONE ENCOUNTER
He may be having a more significant viral infection.  He should have been offered an appointment.  I will see him this week please make an appointment.

## 2019-02-25 NOTE — PROGRESS NOTES
SUBJECTIVE:   Ulysses Purcell is a 68 year old male who presents to clinic today for the following health issues:      URI/Sinus Problem      Duration: 10 days     Description (location/character/radiation): Swollen lymph nodes    Intensity:  Mild 3/10    Accompanying signs and symptoms: Little cough, congested     History (similar episodes/previous evaluation): Last year, similar problem    Precipitating or alleviating factors: None    Therapies tried and outcome: Aleve 12 hr cold and sinus; helps with congestion       Problem list and histories reviewed & adjusted, as indicated.  Additional history: as documented    Patient Active Problem List   Diagnosis     Essential hypertension     Abdominal bruit     CLL (chronic lymphocytic leukemia) (H)     Routine general medical examination at a health care facility     ACP (advance care planning)     S/P laparotomy     Skin tag     Past Surgical History:   Procedure Laterality Date     BACK SURGERY  1997    L4-L5 partial laminectomy     BIOPSY  06/08/2017    abdomen     COLONOSCOPY  02/04/2004    No polyps     COLONOSCOPY  8/4/2014    Procedure: COLONOSCOPY;  Surgeon: Meliza Morales MD;  Location: [facility]     COLONOSCOPY N/A 8/13/2018    Procedure: COLONOSCOPY;  COLONOSCOPY;  Surgeon: Ministerio Henry MD;  Location: [facility] OR      LEFT HEART CATHETERIZATION  05/10/2005    Normal angiogram     LAMINECTOMY CERIVCAL POSTERIOR ONE LEVEL  1997     LAPAROSCOPY DIAGNOSTIC (GENERAL) N/A 6/8/2017    Procedure: LAPAROSCOPY DIAGNOSTIC (GENERAL);  Diagnostic Laparoscopy, Laparatomy Open resection of Mesenteric Lymph Node ;  Surgeon: Melba Goodrich MD;  Location:  OR       Social History     Tobacco Use     Smoking status: Never Smoker     Smokeless tobacco: Never Used   Substance Use Topics     Alcohol use: Yes     Comment: red wine nightly     Family History   Problem Relation Age of Onset     Heart Disease Mother         stents the 2nd time, CABG     C.A.D. Mother       Coronary Artery Disease Mother      Cerebrovascular Disease Father 58     Heart Disease Father      Hyperlipidemia Father      Unknown/Adopted Maternal Grandmother      Unknown/Adopted Maternal Grandfather      Unknown/Adopted Paternal Grandmother      Unknown/Adopted Paternal Grandfather      Hypertension Brother      Other Cancer Brother      Hypertension Brother      Other Cancer Brother      Cancer No family hx of         skin cancer     Diabetes No family hx of      Breast Cancer No family hx of      Colon Cancer No family hx of      Anesthesia Reaction No family hx of      Asthma No family hx of      Thyroid Disease No family hx of          Current Outpatient Medications   Medication Sig Dispense Refill     amoxicillin (AMOXIL) 875 MG tablet Take 1 tablet (875 mg) by mouth 2 times daily for 10 days 20 tablet 0     aspirin 81 MG tablet Take  by mouth daily.       B Complex-Biotin-FA (MULTI-B COMPLEX PO) Take  by mouth daily.       calcium-vitamin D (CALCIUM 600/VITAMIN D) 600-400 MG-UNIT per tablet Take 1 tablet by mouth daily.       Coenzyme Q10 (COQ10) 100 MG CAPS Take  by mouth daily.       Ferrous Sulfate (IRON) 325 (65 FE) MG tablet Take 1 tablet by mouth daily.       fish oil-omega-3 fatty acids (FISH OIL) 1000 MG capsule Take 1 capsule by mouth daily.        Garlic 1000 MG CAPS Take  by mouth daily.       Glucosamine-Chondroitin (GLUCOSAMINE CHONDR COMPLEX PO) Take  by mouth daily.       lisinopril (PRINIVIL,ZESTRIL) 30 MG tablet Take 1 tablet (30 mg) by mouth daily 90 tablet 3     MAGNESIUM OXIDE PO Take 400 mg by mouth       multivitamin (OCUVITE) TABS Take 1 tablet by mouth daily       No Known Allergies  BP Readings from Last 3 Encounters:   02/25/19 112/64   02/01/19 124/80   10/24/18 130/80    Wt Readings from Last 3 Encounters:   02/25/19 75.6 kg (166 lb 9.6 oz)   02/01/19 76.2 kg (168 lb)   10/24/18 74.8 kg (165 lb)                  Labs reviewed in EPIC    Reviewed and updated as needed this  visit by clinical staff  Tobacco  Allergies  Med Hx  Surg Hx  Fam Hx  Soc Hx      Reviewed and updated as needed this visit by Provider         ROS:  Constitutional, HEENT, cardiovascular, pulmonary, gi and gu systems are negative, except as otherwise noted.    OBJECTIVE:                                                    /64 (BP Location: Right arm, Patient Position: Sitting, Cuff Size: Adult Regular)   Pulse 65   Temp 97.6  F (36.4  C) (Tympanic)   Resp 12   Wt 75.6 kg (166 lb 9.6 oz)   SpO2 96%   BMI 23.57 kg/m     Body mass index is 23.57 kg/m .  GENERAL APPEARANCE: alert, mild distress, fatigued and sinus congested  HENT: ear canals and TM's normal, nose and mouth without ulcers or lesions, frontal sinus tenderness bilateral and maxillary sinus tenderness bilateral  NECK: no asymmetry, masses, or scars, thyroid normal to palpation and cervical adenopathy anterior on left, large tender node  RESP: lungs clear to auscultation - no rales, rhonchi or wheezes  CV: regular rates and rhythm, normal S1 S2, no S3 or S4 and no murmur, click or rub  PSYCH: mentation appears normal and affect normal/bright       ASSESSMENT/PLAN:                                                    1. Acute sinusitis with symptoms > 10 days  Probiotics, follow-up if not improving, discussed OTC meds  - amoxicillin (AMOXIL) 875 MG tablet; Take 1 tablet (875 mg) by mouth 2 times daily for 10 days  Dispense: 20 tablet; Refill: 0    Patient was agreeable to this plan and had no further questions.  See Patient Instructions    Nicole Townsend MD  [facility]

## 2019-05-06 NOTE — NURSING NOTE
"Chief Complaint   Patient presents with     RECHECK     Follow up CLL (chronic lymphocytic leukemia)        Initial /60   Pulse 50   Temp 97.4  F (36.3  C) (Tympanic)   Resp 18   Ht 1.791 m (5' 10.5\")   Wt 74.8 kg (165 lb)   SpO2 99%   BMI 23.34 kg/m   Estimated body mass index is 23.34 kg/m  as calculated from the following:    Height as of this encounter: 1.791 m (5' 10.5\").    Weight as of this encounter: 74.8 kg (165 lb).  Medication Reconciliation: complete.  Immunizations reviewed, advanced directives on file, pain = 0, PHQ9 = 0.    Annalise Calvert LPN    "

## 2019-05-06 NOTE — PATIENT INSTRUCTIONS
We would like to see you back next week. Please come 30minute(s) prior for lab work.      Your prescription for Hydrea has been sent to:   [id number] - [place], [place] - [address]  [address] [place] [address]  Phone: [phone] Fax: [phone]    Patient Education     Hydroxyurea Oral capsule  What is this medicine?  HYDROXYUREA (ann-marie drox ee yoor EE a) is a chemotherapy drug. It slows the growth of cancer cells. This medicine is used to treat certain leukemias, skin cancer, head and neck cancer, and advanced ovarian cancer. It is also used to control the painful crises of sickle cell anemia.  This medicine may be used for other purposes; ask your health care provider or pharmacist if you have questions.  What should I tell my health care provider before I take this medicine?  They need to know if you have any of these conditions:    immune system problems    infection (especially a virus infection such as chickenpox, cold sores, or herpes)    kidney disease    low blood counts, like low white cell, platelet, or red cell counts    previous or ongoing radiation therapy    an unusual or allergic reaction to hydroxyurea, other chemotherapy, other medicines, foods, dyes, or preservatives    pregnant or trying to get pregnant    breast-feeding  How should I use this medicine?  Take this medicine by mouth with a glass of water. Follow the directions on the prescription label. Take your medicine at regular intervals. Do not take it more often than directed. Do not stop taking except on your doctor's advice.  People who are not taking this medicine should not be exposed to it. Wash your hands before and after handling your bottle or medicine. Caregivers should wear disposable gloves if they must touch the bottle or medicine. Clean up any medicine powder that spills with a damp disposable towel and throw the towel away in a closed container, such as a plastic bag.  Talk to your pediatrician  regarding the use of this medicine in children. Special care may be needed.  Overdosage: If you think you have taken too much of this medicine contact a poison control center or emergency room at once.  NOTE: This medicine is only for you. Do not share this medicine with others.  What if I miss a dose?  If you miss a dose, take it as soon as you can. If it is almost time for your next dose, take only that dose. Do not take double or extra doses.  What may interact with this medicine?    didanosine    other chemotherapy agents    stavudine    tenofovir    vaccines  This list may not describe all possible interactions. Give your health care provider a list of all the medicines, herbs, non-prescription drugs, or dietary supplements you use. Also tell them if you smoke, drink alcohol, or use illegal drugs. Some items may interact with your medicine.  What should I watch for while using this medicine?  This drug may make you feel generally unwell. This is not uncommon, as chemotherapy can affect healthy cells as well as cancer cells. Report any side effects. Continue your course of treatment even though you feel ill unless your doctor tells you to stop. You will receive regular blood tests during your treatment.  Call your doctor or health care professional for advice if you get a fever, chills or sore throat, or other symptoms of a cold or flu. Do not treat yourself. This drug decreases your body's ability to fight infections. Try to avoid being around people who are sick.  This medicine may increase your risk to bruise or bleed. Call your doctor or health care professional if you notice any unusual bleeding.  Talk to your doctor about your risk of cancer. You may be more at risk for certain types of cancers if you take this medicine.  You may need blood work done while you are taking this medicine.  Do not become pregnant while taking this medicine. Women should inform their doctor if they wish to become pregnant or  think they might be pregnant. Men should not father a child while taking this medicine. There is a potential for serious side effects to an unborn child. Talk to your health care professional or pharmacist for more information. Do not breast-feed an infant while taking this medicine.  What side effects may I notice from receiving this medicine?  Side effects that you should report to your doctor or health care professional as soon as possible:    allergic reactions like skin rash, itching or hives, swelling of the face, lips, or tongue    low blood counts - this medicine may decrease the number of white blood cells, red blood cells and platelets. You may be at increased risk for infections and bleeding.    signs of infection - fever or chills, cough, sore throat, pain or difficulty passing urine    signs of decreased platelets or bleeding - bruising, pinpoint red spots on the skin, black, tarry stools, blood in the urine    signs of decreased red blood cells - unusually weak or tired, fainting spells, lightheadedness    breathing problems    burning, redness or pain at the site of any radiation therapy    changes in skin color    confusion    mouth sores    pain, tingling, numbness in the hands or feet    seizures    skin ulcers    trouble passing urine or change in the amount of urine    vomiting  Side effects that usually do not require medical attention (report to your doctor or health care professional if they continue or are bothersome):    headache    loss of appetite    red color to the face  This list may not describe all possible side effects. Call your doctor for medical advice about side effects. You may report side effects to FDA at 6-251-TBW-2519.  Where should I keep my medicine?  Keep out of the reach of children.  Store at room temperature between 15 and 30 degrees C (59 and 86 degrees F). Keep tightly closed. Throw away any unused medicine after the expiration date.  NOTE:This sheet is a summary. It  may not cover all possible information. If you have questions about this medicine, talk to your doctor, pharmacist, or health care provider. Copyright  2016 Gold Standard             When you are in need of a refill of your medications, please call your pharmacy and they will send us the request. If you have any questions please call [phone]

## 2019-05-06 NOTE — TELEPHONE ENCOUNTER
DATE:  5/6/2019   TIME OF RECEIPT FROM LAB:  8:55  LAB TEST:  WBC  LAB VALUE:  170.2  RESULTS GIVEN WITH READ-BACK TO (PROVIDER):  Dr Booker  TIME LAB VALUE REPORTED TO PROVIDER:   8:56    Patient is seeing Dr Booker for follow up visit now.  Annalise Calvert LPN

## 2019-05-07 NOTE — PROGRESS NOTES
Visit Date:   2019      HISTORY OF PRESENT ILLNESS:  Mr. Rodriguez returns for follow-up of CLL.  We saw the patient in consultation on 2013.  At that time, he had undergone a routine physical exam by Dr. Juan Kenney.  As part of the workup, a CBC was obtained and revealed a white count of 18.9 69.6% lymphocytes, H and H was 14.8 22.3, platelet count is 158,000.  The patient underwent peripheral blood smear review and had lymphocytosis consistent with chronic lymphocytic leukemia small/small lymphocytic lymphoma, and mild thrombocytopenia.  When we saw the patient, we obtain peripheral blood for flow cytometry and this was consistent with CLL.  Serum protein Lipase was negative.  The patient was asymptomatic.  He did note that his brother had hairy cell leukemia, and another brother  at age 3 of metastatic adenocarcinoma of the adrenal gland with multiple mets including pancreas of the  skin.  The patient was felt not to be a candidate for chemotherapy given the fact he was asymptomatic.  Nonetheless, we wanted to refer the patient to Dr. Shantanu Stubbs, CLL expert at the  to evaluate prognosis.  Dr. Stubbs did see the patient on 10/16/2013.  His recommendation and evaluation was that the patient likely had stage II CLL with mild splenomegaly on PET scan, but he could barely palpate a spleen.  Otherwise, the best way to determine prognosis in CLL was the behavior over the course of next several months to a year.  He felt there was no change in the course of lymphocytosis, organomegaly, or other features of the disease could be relatively indolent and recommended CBCs every 3 months with physical exams for nodes and no mass.  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  The patient was essentially asymptomatic at C.  Staging studies in 2014.  CT chest, abdomen and pelvis was negative except for mildly enlarged  spleen at 14 cm.  There was no significant change from previous exams.  CBC remained relatively stable. He was seen in 04/2017.  At that time, a CBC revealed a white count of 17.4 with 87% lymphocytes, H and H 13.5 and 40.2, platelet count is 152.  His LDH was elevated at 334.  Staging studies performed on 05/05/2017.  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  There were prominent periaortic lymph nodes which were worse when compared to prior CT demonstrated largest having increased from 50.8 cm to 17.4 cm stent.  There was no mediastinal adenopathy.  There was borderline ectatic thoracic aorta measuring up to 3.5 cm.  The ascending segment, there was a decrease in numbers, small bilateral axillary nodes.  CT neck revealed diffuse cervical adenopathy, enlarged right-sided lymph nodes throughout the neck.  The largest 7.8 cm and right jugulodigastric region.  The right level 2b lymph nodes measured 11.4 mm.  The patient described a 10 to 15 pound weight loss over the past year and was somewhat fatigued at the end of the day, but otherwise denies any fevers, night sweats or abdominal pain, early satiety.  No shortness of breath.  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  He was seen by Dr. Goodrich is Surgical Oncology problem for esophageal problem.  There was no evidence of a mass.  There was some borderline enlarged lymph nodes which were biopsy which came back consistent with CLL.  Otherwise, no evidence of progression of disease.  His white count on 11/29/2017 was 93,000.  Absolute lymphocyte count 74.1;  otherwise he has been monitored by Louisa Ga, nurse practitioner, who saw the patient on 02/01/2014.  His tumor was asymptomatic.  His white count was up to 150.2 with 94% lymphocytes.  His LDH also was up to 645.  We  decided to proceed with staging studies including CT neck which was read as mildly enlarged cervical nodes without change from 05/2017.  CT chest, abdomen and pelvis was also stable with stable splenomegaly, stable axillary nodes, and intra-abdominal lymph nodes as compared to 05/2017.  The patient states he recently had an upper respiratory infection and was treated by Dr. Townsend with antibiotics approximately a month ago, but he denies any symptoms of fevers, night sweats, or weight loss.  He continues to play golf and exercises regularly without symptoms.      PHYSICAL EXAMINATION:   GENERAL:  He is a middle-aged white male in no acute distress.   VITAL SIGNS:  Blood pressure 100/60, pulse 50, respirations 18, temperature 97.4.   HEENT:  Atraumatic, normocephalic.  Oropharynx erythematous.   NECK:  Supple.   LUNGS:  Clear to auscultation and percussion.   HEART:  Regular rhythm, S1, S2 normal.   ABDOMEN:  Soft, normoactive bowel sounds.  Spleen tip is palpable approximately 3 fingerbreadths below the left costal margin.   LYMPHATICS:  Reveals shotty cervical adenopathy, no axillary or inguinal adenopathy.      LABORATORY DATA:  Reveal CBC with a white count of 170.2 with 86% lymphocytes, H and H 12.6 28.5, platelet count 145.  BUN 15, creatinine 1.04.  LFTs are normal.  LDH is 404.      IMPRESSION:  Stage II CLL with a significant rise in lymphocyte count.  We are concerned about leukocytosis and leukostasis given rise in lymphocyte count and white count of 170,000.  Scans have been essentially stable, but would like to confirm progression by obtaining a PET scan.  In the interim, we would like to side reduce him by starting the patient on Hydrea 1000 mg p.o. daily to get his white count below 100,000.  Otherwise, we will see the patient after the PET scan  approximately 1 week.  If there is evidence of progression, then he will need to start chemotherapy, would likely begin on Rituxan; otherwise see the patient  and stated above.  Obtain CBC, CMP, LDH.      Forty minutes was spent with the patient, greater than half the time spent in counseling and coordination of patient care.         LYNDSEY UGARTE MD             D: 2019   T: 2019   MT: JEANNIE      Name:     NAVEEN MG   MRN:      [id number]        Account:      [id number]   :      1950           Visit Date:   2019      Document: [id number]       cc: Nicole Townsend MD       [facility] DO

## 2019-05-14 NOTE — PATIENT INSTRUCTIONS
Stay on Hydrea.  Order for CT of abdomen and pelvis with oral and IV contrast has been placed.  Follow up with Dr Booker afterward scan.    Please call [phone] with any questions.

## 2019-05-14 NOTE — NURSING NOTE
"Chief Complaint   Patient presents with     RECHECK     chronic Lymphocytic Leukema       Initial /82   Pulse 62   Temp 98.9  F (37.2  C) (Tympanic)   Ht 1.791 m (5' 10.5\")   Wt 74.8 kg (165 lb)   SpO2 98%   BMI 23.34 kg/m   Estimated body mass index is 23.34 kg/m  as calculated from the following:    Height as of this encounter: 1.791 m (5' 10.5\").    Weight as of this encounter: 74.8 kg (165 lb).  Medication Reconciliation: complete    Penny Jackson LPN  "

## 2019-05-15 NOTE — PROGRESS NOTES
Visit Date:   05/14/2019      HISTORY OF PRESENT ILLNESS:  Mr. Purcell returns for followup of CLL.  For details please see previous notes.  Most recently, the patient had been seen last week on 05/06.  At that time, his white count had risen to 150.2 with 94% lymphocytes.  His LDH was also up to 645, this was in 2017.  Decided to proceed with staging studies including CT neck, chest, abdomen and pelvis which revealed stable splenomegaly, no evidence of progression of disease.  Most recently had an upper respiratory tract infection and was treated by Dr. Townsend with antibiotics, approximately a month ago.  When we saw him last week, he was doing relatively well, but his white count had risen up to 170.2 with 86% lymphocytes.  We felt his lymphocyte count was rising and we wanted to prevent leukostasis and placed the patient on Hydrea 1000 mg p.o. daily and elected to restage the patient with a PET scan.  PET scan was inconclusive with the findings of irregular hypermetabolic soft tissue mass in the right lower quadrant measuring approximately 2.5-3 cm in dimension with a peak SUV value of 4.2.  This was not well visualized.  On the unenhanced portion of the CT scan there was smaller hypermetabolic mass in the lower left abdomen medial to the left psoas muscle.  There was splenomegaly, but there was no hypermetabolism associated with the spleen.  According to the radiologist, this might be a false positive, and recommended doing a CT of the abdomen and pelvis with oral and IV contrast.  The patient comes in today.  He says he feels well.  He denies any fevers, night sweats, weight loss.  Continues to play golf.  He is tolerating Hydrea well.      PHYSICAL EXAMINATION:   GENERAL:  He is a middle-aged white male in no acute distress.   VITAL SIGNS:  Blood pressure 130/60, pulse 62, temperature 98.9.   HEENT:  Atraumatic, normocephalic.  Oropharynx nonerythematous.   NECK:  Supple.   LUNGS:  Clear to auscultation and  percussion.   HEART:  Regular rhythm, S1 is normal.   ABDOMEN:  Soft, there is a spleen tip palpable approximately 2 fingerbreadths below the left costal margin.   LYMPHATICS:  No cervical, supraclavicular, axillary or inguinal nodes.   EXTREMITIES:  No edema.   NEUROLOGIC:  Nonfocal.      LABORATORY DATA:  Reveal white count of 135.7, H and H 12.1 and 36.4, platelet count was 133.  There were 84% lymphocytes, 11% neutrophils.      IMPRESSION:  Stage II CLL with rising lymphocyte count.  The patient started on Hydrea 1000 mg daily with drop in lymphocyte count.  PET scan was inconclusive.  We would like to rule out active disease by obtaining a CT of the abdomen and pelvis with oral contrast and IV contrast and then reassess.  We will continue Hydrea 1000 mg daily for now and proceed with further management subsequently.  The patient is currently asymptomatic, but nonetheless would like to rule out any active disease.      Forty minutes was spent with the patient, greater than half the time spent in counseling and coordination of care.         LYNDSEY UGARTE MD             D: 2019   T: 2019   MT: GIO      Name:     NAVEEN MG   MRN:      [id number]        Account:      [id number]   :      1950           Visit Date:   2019      Document: [id number]       cc: [facility]

## 2019-05-28 NOTE — PROGRESS NOTES
Visit Date:   05/28/2019      HISTORY OF PRESENT ILLNESS:  Mr. Purcell returns for followup of CLL.  For details of history, see previous notes.  Most recently, the patient has been seen on 05/06/2019.  At that time, his white count had presented at 150.2 with 94% lymphocytes.  LDH was also up to 6.5.    the patient had an upper respiratory tract infection and was treated by Dr. Townsend with antibiotics approximately a month ago.  When we saw him subsequently, he was doing relatively well.  His white count had risen up to 170.2 with 86% lymphocytes.  We thought his lymphocyte count was rising, we wanted to prevent leukostasis, placed him on Hydrea 1000 mg p.o. daily.  We elected to restage the patient with a PET scan.  PET scan was inconclusive.  The findings of irregular hypermetabolic soft tissue mass in the right lower quadrant measuring approximately 2.5-3 cm dimension with a peak SUV value of 4.2.  This was not visualized on the unenhanced portion of the CT, there was smaller hypermetabolic mass in the lower left abdomen, medial to the left psoas muscle.  There was splenomegaly with no hypermetabolism associated with spleen.  According to the radiologist, Dr. Mathis, this may be a false positive and recommended doing a CT of the abdomen and pelvis with oral and IV contrast.  This was performed on 05/20 and the findings were that there were no masses seen in the right lower quadrant.  There was just a normal bowel.  There was some thickening of the wall of the sigmoid colon possibly on the basis of colitis.  There was no change in his splenomegaly, there was no lymphadenopathy.  The patient otherwise is asymptomatic.  He denies any fevers, night sweats, weight loss, diarrhea, change in bowel habits.  He is planning to play golf later today.  He has been playing golf on a regular basis.  Has no real symptoms of fevers, night sweats or weight loss.      PHYSICAL EXAMINATION:   GENERAL:  He is a well-developed,  well-nourished white male in no acute distress.   VITAL SIGNS:  Blood pressure is 126/78, pulse 69, temperature 96.9.   HEENT:  Atraumatic, normocephalic.  Oropharynx nonerythematous.   NECK:  Supple.   LUNGS:  Clear to auscultation and percussion.   HEART:  Regular rhythm, S1, S2 normal.   ABDOMEN:  Soft, normoactive bowel sounds, no masses.  Spleen tip is palpable approximately 2 fingerbreadths below the costal margin.   LYMPHATICS:  Reveal no cervical, supraclavicular nodes.   EXTREMITIES:  No edema.   NEUROLOGIC:  Nonfocal.      LABORATORY DATA:  Reveal white count of 143.6 with 95% lymphocytes, H and H is 12.3 and 37.0, platelet count is 106.      IMPRESSION:  Stage II CLL with rising lymphocyte count.  The patient was started on Hydrea 1000 mg daily with drop in lymphocyte count.  PET scan was inconclusive.  CT of the abdomen and pelvis with oral contrast reveals no evidence of disease.  There is stable splenomegaly.  The patient remains asymptomatic.  The patient did have a recent colonoscopy which was normal.  Otherwise, the plan is to continue surveillance.  We will continue Hydrea 1000 mg daily to maintain a stable lymphocyte count.  Otherwise, we will see the patient in 2 months.  Monitor CBCs monthly.  Obtain CBC, CMP, LDH.      Forty minutes was spent with the patient, greater than half the time spent in counseling and coordination of care.         LYNDSEY UGARTE MD             D: 2019   T: 2019   MT: MILI      Name:     NAVEEN MG   MRN:      [id number]        Account:      [id number]   :      1950           Visit Date:   2019      Document: [id number]       cc: [facility]

## 2019-05-28 NOTE — NURSING NOTE
"Chief Complaint   Patient presents with     RECHECK     CLL (chronic lymphocytic leukemia)       Initial /78   Pulse 59   Temp 96.9  F (36.1  C) (Tympanic)   Ht 1.791 m (5' 10.5\")   Wt 74.8 kg (165 lb)   SpO2 97%   BMI 23.34 kg/m   Estimated body mass index is 23.34 kg/m  as calculated from the following:    Height as of this encounter: 1.791 m (5' 10.5\").    Weight as of this encounter: 74.8 kg (165 lb).  Medication Reconciliation: complete   Immunizations reviewed; PHQ-9=0, pain=0, AHD on file.    Penny Jackson LPN  "

## 2019-05-28 NOTE — TELEPHONE ENCOUNTER
DATE:  5/28/2019   TIME OF RECEIPT FROM LAB:  8:59  LAB TEST:  WBC   LAB VALUE:  143.6  RESULTS GIVEN WITH READ-BACK TO (PROVIDER):  Dr Booker   TIME LAB VALUE REPORTED TO PROVIDER:   9:04    Annalise Calvert LPN    Patient here to see Dr Booker

## 2019-05-29 NOTE — TELEPHONE ENCOUNTER
Called; left message for pt that Dr Booker is in agreement for updating immunizations and he may make appt for the shot room.  Penny Jackson

## 2019-07-01 NOTE — TELEPHONE ENCOUNTER
DATE:  7/1/2019   TIME OF RECEIPT FROM LAB:  9:03  LAB TEST:  WBC  LAB VALUE:  141.8  RESULTS GIVEN WITH READ-BACK TO (PROVIDER):  LYNDSEY UGARTE  TIME LAB VALUE REPORTED TO PROVIDER:   9:03

## 2019-07-29 NOTE — PROGRESS NOTES
DATE:  7/29/2019   TIME OF RECEIPT FROM LAB:  9.20a.m.  LAB TEST:  WBC  LAB VALUE:  146.2  RESULTS GIVEN WITH READ-BACK TO (PROVIDER):  N/A  TIME LAB VALUE REPORTED TO PROVIDER:

## 2019-07-29 NOTE — NURSING NOTE
"Chief Complaint   Patient presents with     RECHECK     chronic lymphocytic leukemia       Initial /78   Pulse 60   Temp 98.1  F (36.7  C) (Tympanic)   Ht 1.791 m (5' 10.5\")   Wt 74.8 kg (165 lb)   SpO2 97%   BMI 23.34 kg/m   Estimated body mass index is 23.34 kg/m  as calculated from the following:    Height as of this encounter: 1.791 m (5' 10.5\").    Weight as of this encounter: 74.8 kg (165 lb).  Medication Reconciliation: complete   Immunizations reviewed; phq=0; pain=0; ahd=on file.  Penny Jackson  "

## 2019-07-29 NOTE — PATIENT INSTRUCTIONS
We would like to see you back monthly for three months. We will see you back in three months with labs. When you are in need of a refill of your medications, please call your pharmacy and they will send us the request. If you have any questions please call [phone]

## 2019-07-30 NOTE — PROGRESS NOTES
Visit Date:   07/29/2019      HEMATOLOGY/ONCOLOGY CLINIC NOTE        HISTORY OF PRESENT ILLNESS:  Mr. Purcell returns for follow up of CLL.  For details see history in previous notes.  Most recently, the patient had been seen on 05/06/2019 and at that time, his white count had elevated to 150.2 with 94% lymphocytes.  The patient also had an upper respiratory tract infection and was treated by Dr. Townsend with antibiotics a month prior.  When we saw him subsequently, he was doing relatively well.  His white count had risen up to 170.2 with 86% lymphocytes with rising lymphocytes.  With lymphocyte counts rising, we wanted to prevent leukostasis, so we placed him on Hydrea 1000 mg p.o. daily.  We elected to restage the patient with a PET scan.  PET scan was inconclusive.  There was finding of irregular hypermetabolic soft tissue mass in the right lower quadrant measuring approximately 2.5-3 cm in dimension with aq peak SUV value of 4.2 that was not visualized on the unenhanced portion of the CT that was similar.  There was a smaller hypermetabolic mass in the lower left abdomen medial to the left psoas muscle.  There was splenomegaly with no hypermetabolism associated with spleen.  According to the radiologist, Dr. Mathis, this may be a false positive and he recommended doing a CT of the abdomen and pelvis with oral and IV contrast.  This was performed on 05/20/2019.  The findings were there were no masses seen in the right lower quadrant, there was just a normal bowel.  There was some thickening of the wall of the sigmoid colon, possibly on the basis of colitis.  There was no change in his splenomegaly.  There was no lymphadenopathy.  The patient otherwise was asymptomatic.  He denied any fevers, night sweats, weight loss, diarrhea or change in bowel habits.  He has been playing golf on a regular basis.  He had no symptoms of fevers, night sweats or weight loss.  When we saw him last on 05/28, the plan was to  continue on Hydrea 1000 mg daily and to monitor his white count.  The patient comes in today.  He says he continues to be asymptomatic.  He denies any fevers, night sweats, weight loss, abdominal pain, chest pain, headaches, lymphadenopathy.  He continues to play golf on a regular basis.      PHYSICAL EXAMINATION:   GENERAL:  He is a middle-aged white male in no acute distress.   VITAL SIGNS:  Blood pressure 130/78, pulse 60, temperature 98.1.   HEENT:  Atraumatic, normocephalic.  Oropharynx nonerythematous.   NECK:  Supple.   LUNGS:  Clear to auscultation and percussion.   HEART:  Regular rhythm, S1, S2 normal.   ABDOMEN:  Soft, normoactive bowel sounds.  No mass, nontender.   LYMPHATICS:  No cervical, supraclavicular, axillary or inguinal nodes.   EXTREMITIES:  Without edema.   NEUROLOGIC:  Grossly nonfocal.      LABORATORY DATA:  CBC:  White count 146.2 with 97% lymphocytes, hemoglobin 12.4, hematocrit 36.0, platelet count is 119, BUN 20, creatinine 0.97.  LFTs are normal.  LDH is 436.      IMPRESSION:  Stage II CLL with rising lymphocyte count.  The patient was started on Hydrea 1000 mg p.o. daily with drop in lymphocyte count.  PET scan, CT of the abdomen and pelvis have essentially been negative for metastatic disease or progression of disease.  The patient has stable splenomegaly, he remains asymptomatic.  His LDH is improved on Hydrea.  Spleen size has decreased.        PLAN:  The plan is to continue surveillance.  We will continue monthly CBCs and see the patient in 3 months, obtain CBC, CMP, LDH.      TOTAL TIME SPENT:   40 minutes was spent with the patient, greater than half the time spent in counseling and coordination of care.         LYNDSEY UGARTE MD             D: 2019   T: 2019   MT:       Name:     NAVEEN MG   MRN:      [id number]        Account:      [id number]   :      1950           Visit Date:   2019      Document: [id number]       cc: Nicole Townsend  MD

## 2019-08-02 NOTE — LETTER
August 2, 2019      Ulysses Purcell  [address]  [place] [address]        Dear Ulysses,     APPOINTMENT REMINDER:   Our records indicates that it is time for you to be seen for your yearly physical, medication review, and lab work to be determined during your visit.      Your current medication request will be approved for one refill but you will need to be seen before any additional refills can be approved. Taking care of your health is important to us, and ongoing visits with your provider are vital to your care.    We look forward to seeing you in the near future.  You may call our office at [phone] to schedule a visit.     Please disregard this notice if you have already made an appointment.        Sincerely,  Juan Kenney, DO, DO

## 2019-08-02 NOTE — TELEPHONE ENCOUNTER
Received fax notification [facility] stating they didn't receive the lisinopril signed on 7/23/19. We received confirmation. -Prescribing Status: Receipt confirmed by pharmacy (7/23/2019  8:56 AM CDT    However pt hasn't been seen since 7/23/18 and is due for yearly visit and lab work. Letter sent.

## 2019-08-02 NOTE — TELEPHONE ENCOUNTER
lisinopril  Last Written Prescription Date: 7/23/19  Last Fill Quantity: 90 # of Refills: 0  Last Office Visit: 7/23/18

## 2020-03-09 NOTE — TELEPHONE ENCOUNTER
Lisinopril 30mg tab      Last Written Prescription Date: 10-  Last Fill Quantity: 90, # refills: 0  Last Office Visit with G, P or [facility] prescribing provider: 9-   Next 5 appointments (look out 90 days)     Jan 19, 2017  2:00 PM   Return Visit with Nicole Ga NP   [facility] ([facility])    [address] [place] [id number]   [phone]                   POTASSIUM   Date Value Ref Range Status   10/19/2016 4.5 3.4 - 5.3 mmol/L Final     CREATININE   Date Value Ref Range Status   10/19/2016 0.89 0.66 - 1.25 mg/dL Final     BP Readings from Last 3 Encounters:   10/19/16 141/89   09/16/16 126/70   09/13/16 130/84       
19

## 2020-11-02 NOTE — PATIENT INSTRUCTIONS
We would like to see you back in 2 months . Please come 30minute(s) prior for lab work. You have been scheduled for CT scans one weeks prior to your appointment.    When you are in need of a refill of your medications, please call your pharmacy and they will send us the request. If you have any questions please call [phone]     hard copy, drawn during this pregnancy

## 2023-10-25 NOTE — TELEPHONE ENCOUNTER
DATE:  8/4/2017   TIME OF RECEIPT FROM LAB:  0900  LAB TEST:  WBC  LAB VALUE:  66.8  RESULTS GIVEN WITH READ-BACK TO (PROVIDER):  SANTOS WEBSTER  TIME LAB VALUE REPORTED TO PROVIDER:   0905   Expiration Date (Month Year): 11/30/2023

## 2025-01-07 NOTE — PROGRESS NOTES
Visit Date:   2018      DATE OF SURGERY:  2018      HISTORY OF PRESENT ILLNESS:  Mr. Purcell returns for followup of CLL.  We had seen the patient in consultation on 2013.  At that time, he had undergone routine physical exam performed by Dr. Juan Kenney. As part of the workup CBC was obtained and revealed a white count of 18.9 with 69.6% lymphocytes, H and H was 14.8 and 42.3, platelet count was 158,000.  The patient underwent peripheral blood smear. Reviewing the findings were that the patient had lymphocytosis consistent with chronic lymphocytic leukemia, small lymphocytic lymphoma with mild thrombocytopenia. The patient subsequently was seen by Dr. José Luis Courtney who referred the patient to us.  We obtained peripheral blood flow cytometry and this was read by Dr. Aquino at the  as an abnormal B-cell population consistent with B-cell CLL.  The patient was CD5 positive, CD10 negative, as well as CD20 positive with 47% of cells expressed as CD38, 80% B cells revealing large levels of ZAP70 compared to T lymphocytes.  Serum protein electrophoresis was negative.  Otherwise, the patient was asymptomatic. He did note that he had a brother with hairy cell leukemia, another brother  at age 3 of metastatic adenocarcinoma of the adrenal gland with multiple metastases including pancreas and skin. The patient would not be a candidate for therapy given the fact he was asymptomatic but nonetheless I wanted to refer him to Dr. Shantanu Stubbs, CLL expert at the  to evaluate prognosis.  Dr. Stubbs did see the patient on 10/16/2013 and his recommendation and evaluation was that the patient likely had stage II CLL with mild splenomegaly on PET scan, but he could barely palpate a spleen. Therefore he noted that the best way to determine prognosis of CLL was behavioral over the course of the next several months to a year.  He felt if there was no change in the course over the next several months to  years and there was no change in lymphocytosis, organomegaly or other features. He felt that the disease could be relatively indolent and recommended CBCs every 3 months with physical exams for nodes and organomegaly.  He also recommended CT scans in the interim. 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 Stubbs felt this would not change his plan and that the course of the disease would be determined over the first year in terms of activity in the interim. The patient was essentially asymptomatic and had serial staging studies in 05/2014.  CT chest, abdomen and pelvis was negative except for a mildly enlarged spleen at 14 cm.  There was significant change from previous scans. CBC had remained relatively stable.  He was seen in 04/2017.  At that time his CBC revealed a white count of 17.4 with 87% lymphocytes, H and H 13.5 and 40.2 and platelet count was 152.  His LDH was elevated at 334.  of the staging studies which were performed on 05/05/2017. 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 There were prominent periaortic lymph nodes, which were worse when compared to prior. The spleen had demonstrated enlargement, having increased in size from 15.8 cm to 17.4 cm. Tests did not reveal any mediastinal adenopathy.  There was borderline ectatic thoracic aorta measuring up to 3.5 cm in the ascending segment. There was an increased number of small bilateral axillary nodes.  CT neck revealed diffuse cervical adenopathy enlargement of shotty lymph nodes throughout the neck.  the largest was 7.8 cm in the right jugulodigastric region and the right level IIB lymph nodes measured 11.4 mm. The patient described a 10-15-pound weight loss over the past year and is somewhat fatigued at the end of the day, but otherwise denied any fevers, night sweats or  abdominal pain, early satiety or shortness of breath.  He did appear to have lost more than 15 pounds and he looked much thinner than in the past. 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 He was seen by Dr. Goodrich of Surgical Oncology, who apparently performed an exploratory laparotomy and there was no evidence of a mass.  There were some borderline enlarged lymph nodes which she biopsied which came back CLL. Otherwise, no evidence of recurrence or progression of disease.  The patient in the interim since then has been doing well.  His white count has been rising and was 93,000 back on 11/29/2017 with an absolute lymphocyte count of 78.1.  Otherwise, he says he is doing well.  He is exercising regularly.  He is retired.  He plans to go on a trip to [place] and return in April.  He offers no complaints of shortness of breath, chest pain, abdominal pain, fevers, night sweats, weight loss, early satiety, lymphadenopathy.      PHYSICAL EXAMINATION:   GENERAL:  He is a middle-aged white male in no acute distress.   VITAL SIGNS:  Blood pressure 120/82, pulse 72, respirations 20, temperature 97.9.   HEENT:  Atraumatic, normocephalic. Oropharynx nonerythematous.   NECK:  Supple.   LUNGS:  Clear to auscultation and percussion.   HEART:  Regular rhythm, S1, normal.   ABDOMEN:  Spleen tip is barely palpable below the left costal margin.   LYMPHATICS:  Reveals shotty cervical adenopathy, no inguinal or axillary adenopathy.   EXTREMITIES:  Without edema.   NEUROLOGIC:  Nonfocal.      LABORATORY DATA:  Reveal CBC:  White count 102.5 with 89.2 lymphocytes, H and H is 13.6 and 39.9, platelet count is 161.  LDH is 332, BUN 21, creatinine 0.99.  LFTs are normal.      IMPRESSION:  Stage II chronic lymphocytic leukemia with worsening lymphocytosis with normal hemoglobin, normal platelet count and no lymphadenopathy. The patient is asymptomatic without any fevers,  night sweats, weight loss.      PLAN:  Restage the patient in approximately 2 months when he returns from [place] with a CT neck, chest, abdomen and pelvis.  We will repeat CBC at that time.  Otherwise, no need for treatment at this point as the patient remains asymptomatic.      40 minutes was spent with the patient, greater than half the time was spent in counseling.  We discussed lab results, ordered CBC, as well as scans including CT neck, chest, abdomen and pelvis to restage the patient given the high, rising white count.  The patient is agreeable and wants to proceed.         LYNDSEY UGARTE MD             D: 2018   T: 2018   MT: CAMELIA      Name:     NAVEEN MG   MRN:      [id number]        Account:      [id number]   :      1950           Visit Date:   2018      Document: [id number]       cc: Queenie Kenney DO      glasses/none

## (undated) DEVICE — LINEN TOWEL PACK X30 5481

## (undated) DEVICE — ENDO CANNULA 05MM VERSAONE UNIVERSAL UNVCA5STF

## (undated) DEVICE — SPONGE LAP 18X18" X8435

## (undated) DEVICE — SU PDS II 1 CTX 36" Z371T

## (undated) DEVICE — ANTIFOG SOLUTION W/FOAM PAD 31142527

## (undated) DEVICE — Device

## (undated) DEVICE — LINEN TOWEL PACK X6 WHITE 5487

## (undated) DEVICE — ENDO TROCAR 12MM VERSASTEP EXP SLEEVE VS101000

## (undated) DEVICE — ENDO GELPORT 100/120MM C8XX2

## (undated) DEVICE — DECANTER VIAL 2006S

## (undated) DEVICE — IRRIGATION-H2O 1000ML

## (undated) DEVICE — SU DERMABOND ADVANCED .7ML DNX12

## (undated) DEVICE — SOL WATER IRRIG 1000ML BOTTLE 2F7114

## (undated) DEVICE — DRAPE IOBAN INCISE 23X17" 6650EZ

## (undated) DEVICE — LIGHT HANDLE X1 31140133

## (undated) DEVICE — GOWN LG DISP 9515

## (undated) DEVICE — ENDO TROCAR 05MM VERSAONE BLADELESS W/STD FIX CAN NONB5STF

## (undated) DEVICE — PREP CHLORAPREP 26ML TINTED ORANGE  260815

## (undated) DEVICE — BLADE KNIFE SURG 15 371115

## (undated) DEVICE — SU VICRYL 3-0 SH 27" J316H

## (undated) DEVICE — TUBING-SUCTION 20FT

## (undated) DEVICE — GOWN IMPERVIOUS BREATHABLE SMART XLG 89045

## (undated) DEVICE — SU MONOCRYL 4-0 PS-2 27" UND Y426H

## (undated) DEVICE — SUCTION TIP YANKAUER W/O VENT K86

## (undated) DEVICE — SPONGE RAY-TEC 4X8" 7318

## (undated) DEVICE — TUBING SUCTION 10'X3/16" N510

## (undated) DEVICE — CANISTER-SUCTION 2000CC

## (undated) DEVICE — DRAPE SLEEVE 599

## (undated) DEVICE — SU VICRYL 3-0 SH 27" UND J416H

## (undated) DEVICE — SU VICRYL 0 UR-6 27" J603H

## (undated) DEVICE — SOL NACL 0.9% INJ 1000ML BAG 07983-09

## (undated) DEVICE — ESU LIGASURE IMPACT OPEN SEALER/DVDR CVD LG JAW LF4418

## (undated) DEVICE — SU PDS II 4-0 FS-2 27" Z422H

## (undated) DEVICE — GLOVE PROTEXIS BLUE W/NEU-THERA 6.0  2D73EB60

## (undated) DEVICE — BLADE CLIPPER SGL USE 9680

## (undated) DEVICE — GLOVE PROTEXIS MICRO 5.5  2D73PM55

## (undated) DEVICE — CONNECTOR-ERBEFLO 2 PORT

## (undated) DEVICE — SUCTION IRR STRYKERFLOW II W/TIP 250-070-520

## (undated) DEVICE — ESU GROUND PAD ADULT W/CORD E7507

## (undated) DEVICE — ESU PENCIL W/COATED BLADE E2450H

## (undated) DEVICE — SENSOR-OXISENSOR II ADULT

## (undated) DEVICE — ENDO TROCAR BLUNT 100MM W/THRD ANCHOR BLUNTPORT BPT12STS

## (undated) DEVICE — ENDO SHEARS 5MM 176643

## (undated) DEVICE — COVER CAMERA IN-LIGHT DISP LT-C02

## (undated) DEVICE — ESU ELEC BLADE 6" COATED/INSULATED E1455-6

## (undated) RX ORDER — CEFAZOLIN SODIUM 2 G/100ML
INJECTION, SOLUTION INTRAVENOUS
Status: DISPENSED
Start: 2017-06-08

## (undated) RX ORDER — ACETAMINOPHEN 325 MG/1
TABLET ORAL
Status: DISPENSED
Start: 2017-06-08

## (undated) RX ORDER — CEFAZOLIN SODIUM 1 G/3ML
INJECTION, POWDER, FOR SOLUTION INTRAMUSCULAR; INTRAVENOUS
Status: DISPENSED
Start: 2017-06-08

## (undated) RX ORDER — HYDROMORPHONE HYDROCHLORIDE 1 MG/ML
INJECTION, SOLUTION INTRAMUSCULAR; INTRAVENOUS; SUBCUTANEOUS
Status: DISPENSED
Start: 2017-06-08

## (undated) RX ORDER — FENTANYL CITRATE 50 UG/ML
INJECTION, SOLUTION INTRAMUSCULAR; INTRAVENOUS
Status: DISPENSED
Start: 2017-06-08

## (undated) RX ORDER — PROPOFOL 10 MG/ML
INJECTION, EMULSION INTRAVENOUS
Status: DISPENSED
Start: 2017-06-08

## (undated) RX ORDER — ONDANSETRON 2 MG/ML
INJECTION INTRAMUSCULAR; INTRAVENOUS
Status: DISPENSED
Start: 2017-06-08

## (undated) RX ORDER — PHENYLEPHRINE HCL IN 0.9% NACL 1 MG/10 ML
SYRINGE (ML) INTRAVENOUS
Status: DISPENSED
Start: 2017-06-08

## (undated) RX ORDER — LIDOCAINE HYDROCHLORIDE 20 MG/ML
INJECTION, SOLUTION EPIDURAL; INFILTRATION; INTRACAUDAL; PERINEURAL
Status: DISPENSED
Start: 2017-06-08

## (undated) RX ORDER — PROPOFOL 10 MG/ML
INJECTION, EMULSION INTRAVENOUS
Status: DISPENSED
Start: 2018-01-01

## (undated) RX ORDER — OXYCODONE HYDROCHLORIDE 5 MG/1
TABLET ORAL
Status: DISPENSED
Start: 2017-06-08

## (undated) RX ORDER — METOPROLOL TARTRATE 1 MG/ML
INJECTION, SOLUTION INTRAVENOUS
Status: DISPENSED
Start: 2017-06-08

## (undated) RX ORDER — DEXAMETHASONE SODIUM PHOSPHATE 4 MG/ML
INJECTION, SOLUTION INTRA-ARTICULAR; INTRALESIONAL; INTRAMUSCULAR; INTRAVENOUS; SOFT TISSUE
Status: DISPENSED
Start: 2017-06-08

## (undated) RX ORDER — LIDOCAINE HYDROCHLORIDE 20 MG/ML
INJECTION, SOLUTION EPIDURAL; INFILTRATION; INTRACAUDAL; PERINEURAL
Status: DISPENSED
Start: 2018-01-01